# Patient Record
Sex: MALE | Race: WHITE | Employment: OTHER | URBAN - METROPOLITAN AREA
[De-identification: names, ages, dates, MRNs, and addresses within clinical notes are randomized per-mention and may not be internally consistent; named-entity substitution may affect disease eponyms.]

---

## 2018-05-18 ENCOUNTER — HOSPITAL ENCOUNTER (OUTPATIENT)
Dept: PREADMISSION TESTING | Age: 68
Discharge: HOME | End: 2018-05-18
Attending: UROLOGY | Admitting: UROLOGY

## 2018-05-18 VITALS
TEMPERATURE: 97.3 F | RESPIRATION RATE: 16 BRPM | HEIGHT: 70 IN | WEIGHT: 218 LBS | BODY MASS INDEX: 31.21 KG/M2 | OXYGEN SATURATION: 98 % | DIASTOLIC BLOOD PRESSURE: 70 MMHG | SYSTOLIC BLOOD PRESSURE: 143 MMHG | HEART RATE: 64 BPM

## 2018-05-18 LAB
ANION GAP SERPL CALCULATED.3IONS-SCNC: 13 MMOL/L (ref 4–16)
BUN BLDV-MCNC: 20 MG/DL (ref 6–23)
CALCIUM SERPL-MCNC: 9.6 MG/DL (ref 8.3–10.6)
CHLORIDE BLD-SCNC: 98 MMOL/L (ref 99–110)
CO2: 27 MMOL/L (ref 21–32)
CREAT SERPL-MCNC: 0.9 MG/DL (ref 0.9–1.3)
EKG ATRIAL RATE: 53 BPM
EKG DIAGNOSIS: NORMAL
EKG P AXIS: 41 DEGREES
EKG P-R INTERVAL: 152 MS
EKG Q-T INTERVAL: 430 MS
EKG QRS DURATION: 106 MS
EKG QTC CALCULATION (BAZETT): 403 MS
EKG R AXIS: -10 DEGREES
EKG T AXIS: 47 DEGREES
EKG VENTRICULAR RATE: 53 BPM
GFR AFRICAN AMERICAN: >60 ML/MIN/1.73M2
GFR NON-AFRICAN AMERICAN: >60 ML/MIN/1.73M2
GLUCOSE BLD-MCNC: 91 MG/DL (ref 70–99)
HCT VFR BLD CALC: 46.1 % (ref 42–52)
HEMOGLOBIN: 15.3 GM/DL (ref 13.5–18)
MCH RBC QN AUTO: 31.7 PG (ref 27–31)
MCHC RBC AUTO-ENTMCNC: 33.2 % (ref 32–36)
MCV RBC AUTO: 95.4 FL (ref 78–100)
PDW BLD-RTO: 12.2 % (ref 11.7–14.9)
PLATELET # BLD: 355 K/CU MM (ref 140–440)
PMV BLD AUTO: 9 FL (ref 7.5–11.1)
POTASSIUM SERPL-SCNC: 5.1 MMOL/L (ref 3.5–5.1)
RBC # BLD: 4.83 M/CU MM (ref 4.6–6.2)
SODIUM BLD-SCNC: 138 MMOL/L (ref 135–145)
WBC # BLD: 7.3 K/CU MM (ref 4–10.5)

## 2018-05-18 RX ORDER — TAMSULOSIN HYDROCHLORIDE 0.4 MG/1
0.4 CAPSULE ORAL 2 TIMES DAILY
COMMUNITY
End: 2019-06-19 | Stop reason: ALTCHOICE

## 2018-05-18 RX ORDER — OXYCODONE HYDROCHLORIDE AND ACETAMINOPHEN 5; 325 MG/1; MG/1
1 TABLET ORAL PRN
COMMUNITY
End: 2019-07-22

## 2018-05-18 RX ORDER — MAG HYDROX/ALUMINUM HYD/SIMETH 400-400-40
SUSPENSION, ORAL (FINAL DOSE FORM) ORAL EVERY MORNING
COMMUNITY
End: 2019-06-19 | Stop reason: ALTCHOICE

## 2018-05-18 RX ORDER — CETIRIZINE HYDROCHLORIDE, PSEUDOEPHEDRINE HYDROCHLORIDE 5; 120 MG/1; MG/1
1 TABLET, FILM COATED, EXTENDED RELEASE ORAL PRN
COMMUNITY
End: 2019-06-19 | Stop reason: ALTCHOICE

## 2018-05-18 RX ORDER — LISINOPRIL 10 MG/1
10 TABLET ORAL EVERY MORNING
COMMUNITY
End: 2019-06-19 | Stop reason: ALTCHOICE

## 2018-05-18 RX ORDER — TEMAZEPAM 30 MG/1
30 CAPSULE ORAL NIGHTLY
COMMUNITY
End: 2019-07-22

## 2018-05-18 RX ORDER — CIPROFLOXACIN 2 MG/ML
400 INJECTION, SOLUTION INTRAVENOUS ONCE
Status: CANCELLED | OUTPATIENT
Start: 2018-05-21

## 2018-05-18 ASSESSMENT — PAIN SCALES - GENERAL: PAINLEVEL_OUTOF10: 0

## 2018-05-21 PROBLEM — N32.89 BLADDER MASS: Status: ACTIVE | Noted: 2018-05-21

## 2018-06-13 ENCOUNTER — HOSPITAL ENCOUNTER (OUTPATIENT)
Dept: OTHER | Age: 68
Discharge: HOME | End: 2018-06-13
Attending: INTERNAL MEDICINE | Admitting: INTERNAL MEDICINE

## 2018-06-13 LAB
ALBUMIN SERPL-MCNC: 4.4 GM/DL (ref 3.4–5)
ALP BLD-CCNC: 61 IU/L (ref 40–129)
ALT SERPL-CCNC: 31 U/L (ref 10–40)
ANION GAP SERPL CALCULATED.3IONS-SCNC: 13 MMOL/L (ref 4–16)
AST SERPL-CCNC: 23 IU/L (ref 15–37)
BILIRUB SERPL-MCNC: 0.2 MG/DL (ref 0–1)
BUN BLDV-MCNC: 24 MG/DL (ref 6–23)
CALCIUM SERPL-MCNC: 9.8 MG/DL (ref 8.3–10.6)
CHLORIDE BLD-SCNC: 100 MMOL/L (ref 99–110)
CO2: 27 MMOL/L (ref 21–32)
CREAT SERPL-MCNC: 0.9 MG/DL (ref 0.9–1.3)
FERRITIN: 316 NG/ML (ref 30–400)
GFR AFRICAN AMERICAN: >60 ML/MIN/1.73M2
GFR NON-AFRICAN AMERICAN: >60 ML/MIN/1.73M2
GLUCOSE BLD-MCNC: 74 MG/DL (ref 70–99)
IRON: 89 UG/DL (ref 59–158)
MAGNESIUM: 2.3 MG/DL (ref 1.8–2.4)
PCT TRANSFERRIN: 31 % (ref 10–44)
POTASSIUM SERPL-SCNC: 4.8 MMOL/L (ref 3.5–5.1)
SODIUM BLD-SCNC: 140 MMOL/L (ref 135–145)
TOTAL IRON BINDING CAPACITY: 291 UG/DL (ref 250–450)
TOTAL PROTEIN: 7 GM/DL (ref 6.4–8.2)
UNSATURATED IRON BINDING CAPACITY: 202 UG/DL (ref 110–370)

## 2018-06-29 ENCOUNTER — HOSPITAL ENCOUNTER (OUTPATIENT)
Dept: OTHER | Age: 68
Discharge: HOME | End: 2018-06-29
Attending: INTERNAL MEDICINE | Admitting: INTERNAL MEDICINE

## 2018-06-29 LAB
ALBUMIN SERPL-MCNC: 3.7 GM/DL (ref 3.4–5)
ALP BLD-CCNC: 76 IU/L (ref 40–128)
ALT SERPL-CCNC: 34 U/L (ref 10–40)
ANION GAP SERPL CALCULATED.3IONS-SCNC: 14 MMOL/L (ref 4–16)
AST SERPL-CCNC: 25 IU/L (ref 15–37)
BACTERIA: NEGATIVE /HPF
BILIRUB SERPL-MCNC: 0.3 MG/DL (ref 0–1)
BILIRUBIN URINE: NEGATIVE MG/DL
BLOOD, URINE: NEGATIVE
BUN BLDV-MCNC: 16 MG/DL (ref 6–23)
CALCIUM SERPL-MCNC: 8.9 MG/DL (ref 8.3–10.6)
CHLORIDE BLD-SCNC: 97 MMOL/L (ref 99–110)
CLARITY: CLEAR
CO2: 25 MMOL/L (ref 21–32)
COLOR: YELLOW
CREAT SERPL-MCNC: 0.8 MG/DL (ref 0.9–1.3)
GFR AFRICAN AMERICAN: >60 ML/MIN/1.73M2
GFR NON-AFRICAN AMERICAN: >60 ML/MIN/1.73M2
GLUCOSE BLD-MCNC: 104 MG/DL (ref 70–99)
GLUCOSE, URINE: NEGATIVE MG/DL
KETONES, URINE: NEGATIVE MG/DL
LEUKOCYTE ESTERASE, URINE: ABNORMAL
MAGNESIUM: 2.2 MG/DL (ref 1.8–2.4)
MUCUS: ABNORMAL HPF
NITRITE URINE, QUANTITATIVE: NEGATIVE
PH, URINE: 5 (ref 5–8)
POTASSIUM SERPL-SCNC: 4.4 MMOL/L (ref 3.5–5.1)
PROTEIN UA: NEGATIVE MG/DL
RBC URINE: 4 /HPF (ref 0–3)
SODIUM BLD-SCNC: 136 MMOL/L (ref 135–145)
SPECIFIC GRAVITY UA: 1.02 (ref 1–1.03)
TOTAL PROTEIN: 6.2 GM/DL (ref 6.4–8.2)
TRICHOMONAS: ABNORMAL /HPF
UROBILINOGEN, URINE: NORMAL MG/DL (ref 0.2–1)
WBC UA: 28 /HPF (ref 0–2)

## 2018-07-01 LAB
CULTURE: NORMAL
REPORT STATUS: NORMAL
REQUEST PROBLEM: NORMAL
SPECIMEN: NORMAL

## 2018-07-06 ENCOUNTER — HOSPITAL ENCOUNTER (OUTPATIENT)
Dept: OTHER | Age: 68
Discharge: HOME | End: 2018-07-06
Attending: INTERNAL MEDICINE | Admitting: INTERNAL MEDICINE

## 2018-07-06 LAB
ALBUMIN SERPL-MCNC: 4 GM/DL (ref 3.4–5)
ALP BLD-CCNC: 73 IU/L (ref 40–129)
ALT SERPL-CCNC: 27 U/L (ref 10–40)
ANION GAP SERPL CALCULATED.3IONS-SCNC: 12 MMOL/L (ref 4–16)
AST SERPL-CCNC: 24 IU/L (ref 15–37)
BILIRUB SERPL-MCNC: 0.2 MG/DL (ref 0–1)
BUN BLDV-MCNC: 17 MG/DL (ref 6–23)
CALCIUM SERPL-MCNC: 9 MG/DL (ref 8.3–10.6)
CHLORIDE BLD-SCNC: 99 MMOL/L (ref 99–110)
CO2: 26 MMOL/L (ref 21–32)
CREAT SERPL-MCNC: 0.8 MG/DL (ref 0.9–1.3)
GFR AFRICAN AMERICAN: >60 ML/MIN/1.73M2
GFR NON-AFRICAN AMERICAN: >60 ML/MIN/1.73M2
GLUCOSE BLD-MCNC: 117 MG/DL (ref 70–99)
MAGNESIUM: 2.1 MG/DL (ref 1.8–2.4)
POTASSIUM SERPL-SCNC: 4.4 MMOL/L (ref 3.5–5.1)
SODIUM BLD-SCNC: 137 MMOL/L (ref 135–145)
TOTAL PROTEIN: 6.6 GM/DL (ref 6.4–8.2)

## 2018-07-07 LAB
CULTURE: NORMAL
REPORT STATUS: NORMAL
REQUEST PROBLEM: NORMAL
SPECIMEN: NORMAL

## 2018-07-16 ENCOUNTER — HOSPITAL ENCOUNTER (OUTPATIENT)
Dept: OTHER | Age: 68
Discharge: HOME | End: 2018-07-16
Attending: NURSE PRACTITIONER | Admitting: NURSE PRACTITIONER

## 2018-07-16 LAB
ALBUMIN SERPL-MCNC: 4 GM/DL (ref 3.4–5)
ALP BLD-CCNC: 59 IU/L (ref 40–128)
ALT SERPL-CCNC: 19 U/L (ref 10–40)
ANION GAP SERPL CALCULATED.3IONS-SCNC: 18 MMOL/L (ref 4–16)
AST SERPL-CCNC: 17 IU/L (ref 15–37)
BILIRUB SERPL-MCNC: 0.3 MG/DL (ref 0–1)
BUN BLDV-MCNC: 33 MG/DL (ref 6–23)
CALCIUM SERPL-MCNC: 9.2 MG/DL (ref 8.3–10.6)
CHLORIDE BLD-SCNC: 95 MMOL/L (ref 99–110)
CO2: 25 MMOL/L (ref 21–32)
CREAT SERPL-MCNC: 1.2 MG/DL (ref 0.9–1.3)
GFR AFRICAN AMERICAN: >60 ML/MIN/1.73M2
GFR NON-AFRICAN AMERICAN: >60 ML/MIN/1.73M2
GLUCOSE BLD-MCNC: 93 MG/DL (ref 70–99)
POTASSIUM SERPL-SCNC: 4.8 MMOL/L (ref 3.5–5.1)
SODIUM BLD-SCNC: 138 MMOL/L (ref 135–145)
TOTAL PROTEIN: 6.6 GM/DL (ref 6.4–8.2)

## 2018-08-20 ENCOUNTER — HOSPITAL ENCOUNTER (OUTPATIENT)
Dept: OTHER | Age: 68
Discharge: HOME | End: 2018-08-20
Attending: INTERNAL MEDICINE | Admitting: INTERNAL MEDICINE

## 2018-08-20 LAB — MAGNESIUM: 1.8 MG/DL (ref 1.8–2.4)

## 2018-08-27 ENCOUNTER — HOSPITAL ENCOUNTER (OUTPATIENT)
Dept: OTHER | Age: 68
Discharge: HOME | End: 2018-08-27
Attending: INTERNAL MEDICINE | Admitting: INTERNAL MEDICINE

## 2018-08-27 LAB — MAGNESIUM: 2.1 MG/DL (ref 1.8–2.4)

## 2018-09-28 ENCOUNTER — HOSPITAL ENCOUNTER (OUTPATIENT)
Age: 68
Setting detail: SPECIMEN
Discharge: HOME | End: 2018-09-28
Payer: MEDICARE

## 2018-09-28 LAB
ALBUMIN SERPL-MCNC: 4.2 GM/DL (ref 3.4–5)
ALP BLD-CCNC: 53 IU/L (ref 40–128)
ALT SERPL-CCNC: 15 U/L (ref 10–40)
ANION GAP SERPL CALCULATED.3IONS-SCNC: 12 MMOL/L (ref 4–16)
AST SERPL-CCNC: 20 IU/L (ref 15–37)
BILIRUB SERPL-MCNC: 0.2 MG/DL (ref 0–1)
BUN BLDV-MCNC: 17 MG/DL (ref 6–23)
CALCIUM SERPL-MCNC: 9.8 MG/DL (ref 8.3–10.6)
CHLORIDE BLD-SCNC: 103 MMOL/L (ref 99–110)
CO2: 27 MMOL/L (ref 21–32)
CREAT SERPL-MCNC: 1 MG/DL (ref 0.9–1.3)
GFR AFRICAN AMERICAN: >60 ML/MIN/1.73M2
GFR NON-AFRICAN AMERICAN: >60 ML/MIN/1.73M2
GLUCOSE BLD-MCNC: 88 MG/DL (ref 70–99)
POTASSIUM SERPL-SCNC: 5.3 MMOL/L (ref 3.5–5.1)
SODIUM BLD-SCNC: 142 MMOL/L (ref 135–145)
TOTAL PROTEIN: 6.2 GM/DL (ref 6.4–8.2)

## 2018-09-28 PROCEDURE — 80053 COMPREHEN METABOLIC PANEL: CPT

## 2018-10-12 ENCOUNTER — HOSPITAL ENCOUNTER (OUTPATIENT)
Dept: MRI IMAGING | Age: 68
Discharge: HOME | End: 2018-10-12
Payer: MEDICARE

## 2018-10-12 DIAGNOSIS — R11.0 NAUSEOUS: ICD-10-CM

## 2018-10-12 DIAGNOSIS — R42 DIZZINESS AND GIDDINESS: ICD-10-CM

## 2018-10-12 DIAGNOSIS — G44.019 EPISODIC CLUSTER HEADACHE, NOT INTRACTABLE: ICD-10-CM

## 2018-10-12 PROCEDURE — 70553 MRI BRAIN STEM W/O & W/DYE: CPT

## 2018-10-12 PROCEDURE — A9579 GAD-BASE MR CONTRAST NOS,1ML: HCPCS | Performed by: INTERNAL MEDICINE

## 2018-10-12 PROCEDURE — 6360000004 HC RX CONTRAST MEDICATION: Performed by: INTERNAL MEDICINE

## 2018-10-12 RX ADMIN — GADOTERIDOL 20 ML: 279.3 INJECTION, SOLUTION INTRAVENOUS at 09:24

## 2018-11-30 ENCOUNTER — HOSPITAL ENCOUNTER (OUTPATIENT)
Age: 68
Setting detail: SPECIMEN
Discharge: HOME | End: 2018-11-30
Payer: MEDICARE

## 2018-11-30 LAB
ALBUMIN SERPL-MCNC: 4.5 GM/DL (ref 3.4–5)
ALP BLD-CCNC: 50 IU/L (ref 40–128)
ALT SERPL-CCNC: 18 U/L (ref 10–40)
ANION GAP SERPL CALCULATED.3IONS-SCNC: 13 MMOL/L (ref 4–16)
AST SERPL-CCNC: 19 IU/L (ref 15–37)
BILIRUB SERPL-MCNC: 0.2 MG/DL (ref 0–1)
BUN BLDV-MCNC: 18 MG/DL (ref 6–23)
CALCIUM SERPL-MCNC: 9.6 MG/DL (ref 8.3–10.6)
CHLORIDE BLD-SCNC: 99 MMOL/L (ref 99–110)
CO2: 26 MMOL/L (ref 21–32)
CREAT SERPL-MCNC: 1.2 MG/DL (ref 0.9–1.3)
GFR AFRICAN AMERICAN: >60 ML/MIN/1.73M2
GFR NON-AFRICAN AMERICAN: 60 ML/MIN/1.73M2
GLUCOSE BLD-MCNC: 101 MG/DL (ref 70–99)
POTASSIUM SERPL-SCNC: 5.2 MMOL/L (ref 3.5–5.1)
SODIUM BLD-SCNC: 138 MMOL/L (ref 135–145)
TOTAL PROTEIN: 6.6 GM/DL (ref 6.4–8.2)

## 2018-11-30 PROCEDURE — 84154 ASSAY OF PSA FREE: CPT

## 2018-11-30 PROCEDURE — 80053 COMPREHEN METABOLIC PANEL: CPT

## 2018-11-30 PROCEDURE — 84153 ASSAY OF PSA TOTAL: CPT

## 2018-12-04 LAB
PROSTATE SPECIFIC ANTIGEN FREE: <0.1
PROSTATE SPECIFIC ANTIGEN PERCENT FREE: NORMAL
PROSTATE SPECIFIC ANTIGEN: <0.1

## 2019-02-11 ENCOUNTER — HOSPITAL ENCOUNTER (OUTPATIENT)
Age: 69
Setting detail: SPECIMEN
Discharge: HOME | End: 2019-02-11
Payer: MEDICARE

## 2019-02-11 LAB
ALBUMIN SERPL-MCNC: 4.3 GM/DL (ref 3.4–5)
ALP BLD-CCNC: 53 IU/L (ref 40–128)
ALT SERPL-CCNC: 21 U/L (ref 10–40)
ANION GAP SERPL CALCULATED.3IONS-SCNC: 10 MMOL/L (ref 4–16)
AST SERPL-CCNC: 26 IU/L (ref 15–37)
BILIRUB SERPL-MCNC: 0.2 MG/DL (ref 0–1)
BUN BLDV-MCNC: 20 MG/DL (ref 6–23)
CALCIUM SERPL-MCNC: 9.3 MG/DL (ref 8.3–10.6)
CHLORIDE BLD-SCNC: 104 MMOL/L (ref 99–110)
CO2: 26 MMOL/L (ref 21–32)
CREAT SERPL-MCNC: 1.4 MG/DL (ref 0.9–1.3)
GFR AFRICAN AMERICAN: >60 ML/MIN/1.73M2
GFR NON-AFRICAN AMERICAN: 50 ML/MIN/1.73M2
GLUCOSE BLD-MCNC: 93 MG/DL (ref 70–99)
MAGNESIUM: 2 MG/DL (ref 1.8–2.4)
POTASSIUM SERPL-SCNC: 4.2 MMOL/L (ref 3.5–5.1)
SODIUM BLD-SCNC: 140 MMOL/L (ref 135–145)
TOTAL PROTEIN: 6.7 GM/DL (ref 6.4–8.2)

## 2019-02-11 PROCEDURE — 80053 COMPREHEN METABOLIC PANEL: CPT

## 2019-02-11 PROCEDURE — 83735 ASSAY OF MAGNESIUM: CPT

## 2019-03-04 ENCOUNTER — HOSPITAL ENCOUNTER (OUTPATIENT)
Dept: MRI IMAGING | Age: 69
Discharge: HOME | End: 2019-03-04
Payer: MEDICARE

## 2019-03-04 DIAGNOSIS — C80.1: ICD-10-CM

## 2019-03-04 DIAGNOSIS — R42 DIZZINESS AND GIDDINESS: ICD-10-CM

## 2019-03-04 PROCEDURE — A9577 INJ MULTIHANCE: HCPCS | Performed by: NURSE PRACTITIONER

## 2019-03-04 PROCEDURE — 6360000004 HC RX CONTRAST MEDICATION: Performed by: NURSE PRACTITIONER

## 2019-03-04 PROCEDURE — 70553 MRI BRAIN STEM W/O & W/DYE: CPT

## 2019-03-04 RX ADMIN — GADOBENATE DIMEGLUMINE 9 ML: 529 INJECTION, SOLUTION INTRAVENOUS at 12:21

## 2019-03-07 ENCOUNTER — HOSPITAL ENCOUNTER (OUTPATIENT)
Dept: PET IMAGING | Age: 69
Discharge: HOME | End: 2019-03-07
Payer: MEDICARE

## 2019-03-07 DIAGNOSIS — C67.9 MALIGNANT NEOPLASM OF URINARY BLADDER, UNSPECIFIED SITE (HCC): ICD-10-CM

## 2019-03-07 PROCEDURE — A9552 F18 FDG: HCPCS | Performed by: RADIOLOGY

## 2019-03-07 PROCEDURE — 78815 PET IMAGE W/CT SKULL-THIGH: CPT

## 2019-03-07 PROCEDURE — 2580000003 HC RX 258: Performed by: RADIOLOGY

## 2019-03-07 PROCEDURE — 3430000000 HC RX DIAGNOSTIC RADIOPHARMACEUTICAL: Performed by: RADIOLOGY

## 2019-03-07 RX ORDER — SODIUM CHLORIDE 0.9 % (FLUSH) 0.9 %
5 SYRINGE (ML) INJECTION PRN
Status: DISCONTINUED | OUTPATIENT
Start: 2019-03-07 | End: 2019-03-08 | Stop reason: HOSPADM

## 2019-03-07 RX ORDER — FLUDEOXYGLUCOSE F 18 200 MCI/ML
16.72 INJECTION, SOLUTION INTRAVENOUS
Status: COMPLETED | OUTPATIENT
Start: 2019-03-07 | End: 2019-03-07

## 2019-03-07 RX ADMIN — SODIUM CHLORIDE, PRESERVATIVE FREE 5 ML: 5 INJECTION INTRAVENOUS at 15:09

## 2019-03-07 RX ADMIN — FLUDEOXYGLUCOSE F 18 16.72 MILLICURIE: 200 INJECTION, SOLUTION INTRAVENOUS at 15:09

## 2019-04-25 ENCOUNTER — HOSPITAL ENCOUNTER (OUTPATIENT)
Dept: MRI IMAGING | Age: 69
Discharge: HOME | End: 2019-04-25
Payer: MEDICARE

## 2019-04-25 DIAGNOSIS — C79.49 SECONDARY MALIGNANT NEOPLASM OF BRAIN AND SPINAL CORD (HCC): ICD-10-CM

## 2019-04-25 DIAGNOSIS — C79.31 SECONDARY MALIGNANT NEOPLASM OF BRAIN AND SPINAL CORD (HCC): ICD-10-CM

## 2019-04-25 LAB
GFR AFRICAN AMERICAN: >60 ML/MIN/1.73M2
GFR NON-AFRICAN AMERICAN: 56 ML/MIN/1.73M2
POC CREATININE: 1.3 MG/DL (ref 0.9–1.3)

## 2019-04-25 PROCEDURE — 70553 MRI BRAIN STEM W/O & W/DYE: CPT

## 2019-04-25 PROCEDURE — A9577 INJ MULTIHANCE: HCPCS | Performed by: RADIOLOGY

## 2019-04-25 PROCEDURE — 6360000004 HC RX CONTRAST MEDICATION: Performed by: RADIOLOGY

## 2019-04-25 RX ADMIN — GADOBENATE DIMEGLUMINE 10 ML: 529 INJECTION, SOLUTION INTRAVENOUS at 11:38

## 2019-05-03 ENCOUNTER — HOSPITAL ENCOUNTER (OUTPATIENT)
Dept: NUCLEAR MEDICINE | Age: 69
Discharge: HOME | End: 2019-05-03
Payer: MEDICARE

## 2019-05-03 DIAGNOSIS — M54.50 NONSPECIFIC PAIN IN THE LUMBAR REGION: ICD-10-CM

## 2019-05-03 DIAGNOSIS — C79.51 BONE METASTASIS (HCC): ICD-10-CM

## 2019-05-03 PROCEDURE — 78306 BONE IMAGING WHOLE BODY: CPT

## 2019-05-03 PROCEDURE — A9503 TC99M MEDRONATE: HCPCS | Performed by: RADIOLOGY

## 2019-05-03 PROCEDURE — 3430000000 HC RX DIAGNOSTIC RADIOPHARMACEUTICAL: Performed by: RADIOLOGY

## 2019-05-03 RX ORDER — TC 99M MEDRONATE 20 MG/10ML
25.2 INJECTION, POWDER, LYOPHILIZED, FOR SOLUTION INTRAVENOUS
Status: COMPLETED | OUTPATIENT
Start: 2019-05-03 | End: 2019-05-03

## 2019-05-03 RX ADMIN — TC 99M MEDRONATE 25.2 MILLICURIE: 20 INJECTION, POWDER, LYOPHILIZED, FOR SOLUTION INTRAVENOUS at 10:20

## 2019-05-10 ENCOUNTER — HOSPITAL ENCOUNTER (OUTPATIENT)
Age: 69
Setting detail: SPECIMEN
Discharge: HOME | End: 2019-05-10
Payer: MEDICARE

## 2019-05-10 LAB
ALBUMIN SERPL-MCNC: 4.3 GM/DL (ref 3.4–5)
ALP BLD-CCNC: 50 IU/L (ref 40–128)
ALT SERPL-CCNC: 16 U/L (ref 10–40)
ANION GAP SERPL CALCULATED.3IONS-SCNC: 15 MMOL/L (ref 4–16)
AST SERPL-CCNC: 20 IU/L (ref 15–37)
BILIRUB SERPL-MCNC: 0.5 MG/DL (ref 0–1)
BUN BLDV-MCNC: 20 MG/DL (ref 6–23)
CALCIUM SERPL-MCNC: 9.5 MG/DL (ref 8.3–10.6)
CHLORIDE BLD-SCNC: 99 MMOL/L (ref 99–110)
CO2: 25 MMOL/L (ref 21–32)
CREAT SERPL-MCNC: 1.2 MG/DL (ref 0.9–1.3)
GFR AFRICAN AMERICAN: >60 ML/MIN/1.73M2
GFR NON-AFRICAN AMERICAN: 60 ML/MIN/1.73M2
GLUCOSE BLD-MCNC: 107 MG/DL (ref 70–99)
POTASSIUM SERPL-SCNC: 4.8 MMOL/L (ref 3.5–5.1)
PSA ULTRASENSITIVE: 0.01 NG/ML (ref 0–4)
SODIUM BLD-SCNC: 139 MMOL/L (ref 135–145)
T4 FREE: 1.07 NG/DL (ref 0.9–1.8)
TOTAL PROTEIN: 6.4 GM/DL (ref 6.4–8.2)
TSH HIGH SENSITIVITY: 2.37 UIU/ML (ref 0.27–4.2)

## 2019-05-10 PROCEDURE — 84443 ASSAY THYROID STIM HORMONE: CPT

## 2019-05-10 PROCEDURE — 84153 ASSAY OF PSA TOTAL: CPT

## 2019-05-10 PROCEDURE — 84439 ASSAY OF FREE THYROXINE: CPT

## 2019-05-10 PROCEDURE — 80053 COMPREHEN METABOLIC PANEL: CPT

## 2019-05-13 ENCOUNTER — HOSPITAL ENCOUNTER (OUTPATIENT)
Dept: GENERAL RADIOLOGY | Age: 69
Discharge: HOME | End: 2019-05-13
Payer: MEDICARE

## 2019-05-13 ENCOUNTER — HOSPITAL ENCOUNTER (OUTPATIENT)
Age: 69
Discharge: HOME | End: 2019-05-13
Payer: MEDICARE

## 2019-05-13 DIAGNOSIS — Z85.51 HISTORY OF BLADDER CANCER: ICD-10-CM

## 2019-05-13 DIAGNOSIS — R53.1 WEAKNESS: ICD-10-CM

## 2019-05-13 DIAGNOSIS — M89.8X9 BONE PAIN: ICD-10-CM

## 2019-05-13 PROCEDURE — 72100 X-RAY EXAM L-S SPINE 2/3 VWS: CPT

## 2019-05-13 PROCEDURE — 72040 X-RAY EXAM NECK SPINE 2-3 VW: CPT

## 2019-05-28 ENCOUNTER — HOSPITAL ENCOUNTER (OUTPATIENT)
Dept: CT IMAGING | Age: 69
Discharge: HOME | End: 2019-05-28
Payer: MEDICARE

## 2019-05-28 DIAGNOSIS — G93.89 PNEUMOCEPHALUS: ICD-10-CM

## 2019-05-28 PROCEDURE — 6360000004 HC RX CONTRAST MEDICATION: Performed by: INTERNAL MEDICINE

## 2019-05-28 PROCEDURE — 70470 CT HEAD/BRAIN W/O & W/DYE: CPT

## 2019-05-28 RX ADMIN — IOPAMIDOL 75 ML: 755 INJECTION, SOLUTION INTRAVENOUS at 17:05

## 2019-06-06 ENCOUNTER — HOSPITAL ENCOUNTER (OUTPATIENT)
Dept: MRI IMAGING | Age: 69
Discharge: HOME | End: 2019-06-06
Payer: MEDICARE

## 2019-06-06 DIAGNOSIS — C79.49 SECONDARY MALIGNANT NEOPLASM OF BRAIN AND SPINAL CORD (HCC): ICD-10-CM

## 2019-06-06 DIAGNOSIS — C79.31 SECONDARY MALIGNANT NEOPLASM OF BRAIN AND SPINAL CORD (HCC): ICD-10-CM

## 2019-06-06 LAB
GFR AFRICAN AMERICAN: >60 ML/MIN/1.73M2
GFR NON-AFRICAN AMERICAN: >60 ML/MIN/1.73M2
POC CREATININE: 1 MG/DL (ref 0.9–1.3)

## 2019-06-06 PROCEDURE — 6360000004 HC RX CONTRAST MEDICATION: Performed by: RADIOLOGY

## 2019-06-06 PROCEDURE — 70553 MRI BRAIN STEM W/O & W/DYE: CPT

## 2019-06-06 PROCEDURE — A9577 INJ MULTIHANCE: HCPCS | Performed by: RADIOLOGY

## 2019-06-06 RX ADMIN — GADOBENATE DIMEGLUMINE 20 ML: 529 INJECTION, SOLUTION INTRAVENOUS at 10:11

## 2019-06-13 RX ORDER — DEXAMETHASONE 4 MG/1
4 TABLET ORAL 3 TIMES DAILY
Status: ON HOLD | COMMUNITY
End: 2019-07-29 | Stop reason: HOSPADM

## 2019-06-13 RX ORDER — LORAZEPAM 0.5 MG/1
0.5 TABLET ORAL NIGHTLY PRN
Status: ON HOLD | COMMUNITY
End: 2019-07-29 | Stop reason: SDUPTHER

## 2019-06-13 RX ORDER — LOSARTAN POTASSIUM 50 MG/1
50 TABLET ORAL DAILY
COMMUNITY
End: 2020-01-16

## 2019-06-13 RX ORDER — MIRTAZAPINE 45 MG/1
45 TABLET, FILM COATED ORAL NIGHTLY
COMMUNITY

## 2019-06-13 RX ORDER — GABAPENTIN 300 MG/1
300 CAPSULE ORAL 3 TIMES DAILY
COMMUNITY

## 2019-06-13 RX ORDER — AMOXICILLIN 250 MG
1 CAPSULE ORAL PRN
COMMUNITY

## 2019-06-13 RX ORDER — LEVETIRACETAM 500 MG/1
500 TABLET ORAL 2 TIMES DAILY
Status: ON HOLD | COMMUNITY
End: 2019-07-29 | Stop reason: HOSPADM

## 2019-06-13 RX ORDER — DRONABINOL 5 MG/1
5 CAPSULE ORAL
Status: ON HOLD | COMMUNITY
End: 2019-08-28 | Stop reason: SDUPTHER

## 2019-06-13 RX ORDER — VITAMIN B COMPLEX
1 CAPSULE ORAL DAILY
COMMUNITY

## 2019-06-18 ENCOUNTER — TELEPHONE (OUTPATIENT)
Dept: RADIATION ONCOLOGY | Age: 69
End: 2019-06-18

## 2019-06-18 NOTE — TELEPHONE ENCOUNTER
INSTRUCTIONS FOR THE DAY OF GAMMA KNIFE PROCEDURE AT THE [facility]    1. Arrive at 6:00 a.m. to register. 2.  DO NOT eat or drink anything after midnight the night before your procedure. 3.  Take all of your usual morning medications the day of the procedure with just a sip of water. 4.  If you are on any blood thinners (Coumadin, Xarelto, Aspirin, Lovenox), you MAY TAKE those medication. There is no need to stop these medications for your procedure. 5.  If you need pain medication during the night before or the morning of your procedure, you may take them with a sip of water. 6.  Bring a current list of all of your medications with you. 7.  Do not wear any make-up. 8.  Wear comfortable, loose-fitting clothing. 9.  Do not wear jewelry, belts, or any clothing that contains metal.  10.  Visitors will be limited to 2 per patient. YOU MUST HAVE SOMEONE DRIVE YOU HOME AFTER YOUR PROCEDURE. Reviewed all pre-procedure instructions with patient via telephone. He seemed confused about which medications he is taking, but states he is taking \"everything Dr. Griselda Bravo prescribed\" which was Keppra and Omeprazole. Medications will be reviewed with his RN in the morning. Answered all questions.     Khadijah Coronel

## 2019-06-19 ENCOUNTER — CLINICAL DOCUMENTATION (OUTPATIENT)
Dept: RADIATION ONCOLOGY | Age: 69
End: 2019-06-19

## 2019-06-19 ENCOUNTER — ANESTHESIA EVENT (OUTPATIENT)
Dept: RADIATION ONCOLOGY | Age: 69
End: 2019-06-19

## 2019-06-19 ENCOUNTER — ANESTHESIA (OUTPATIENT)
Dept: RADIATION ONCOLOGY | Age: 69
End: 2019-06-19

## 2019-06-19 ENCOUNTER — HOSPITAL ENCOUNTER (OUTPATIENT)
Dept: RADIATION ONCOLOGY | Age: 69
Discharge: HOME | End: 2019-06-19
Payer: MEDICARE

## 2019-06-19 ENCOUNTER — HOSPITAL ENCOUNTER (OUTPATIENT)
Dept: MRI IMAGING | Age: 69
Discharge: HOME | End: 2019-06-19
Payer: MEDICARE

## 2019-06-19 ENCOUNTER — ANCILLARY ORDERS (OUTPATIENT)
Dept: MRI IMAGING | Age: 69
End: 2019-06-19

## 2019-06-19 VITALS
OXYGEN SATURATION: 100 % | DIASTOLIC BLOOD PRESSURE: 62 MMHG | RESPIRATION RATE: 18 BRPM | SYSTOLIC BLOOD PRESSURE: 100 MMHG

## 2019-06-19 VITALS
BODY MASS INDEX: 28.67 KG/M2 | HEART RATE: 48 BPM | TEMPERATURE: 97.4 F | OXYGEN SATURATION: 97 % | WEIGHT: 204.8 LBS | RESPIRATION RATE: 18 BRPM | SYSTOLIC BLOOD PRESSURE: 126 MMHG | DIASTOLIC BLOOD PRESSURE: 78 MMHG | HEIGHT: 71 IN

## 2019-06-19 DIAGNOSIS — C79.31 BRAIN METASTASES (HCC): ICD-10-CM

## 2019-06-19 PROCEDURE — 7100000011 HC PHASE II RECOVERY - ADDTL 15 MIN

## 2019-06-19 PROCEDURE — 6360000004 HC RX CONTRAST MEDICATION: Performed by: NEUROLOGICAL SURGERY

## 2019-06-19 PROCEDURE — 6360000002 HC RX W HCPCS: Performed by: NURSE ANESTHETIST, CERTIFIED REGISTERED

## 2019-06-19 PROCEDURE — 70553 MRI BRAIN STEM W/O & W/DYE: CPT

## 2019-06-19 PROCEDURE — 2500000003 HC RX 250 WO HCPCS: Performed by: NEUROLOGICAL SURGERY

## 2019-06-19 PROCEDURE — 77334 RADIATION TREATMENT AID(S): CPT

## 2019-06-19 PROCEDURE — A9579 GAD-BASE MR CONTRAST NOS,1ML: HCPCS | Performed by: NEUROLOGICAL SURGERY

## 2019-06-19 PROCEDURE — 6370000000 HC RX 637 (ALT 250 FOR IP): Performed by: NEUROLOGICAL SURGERY

## 2019-06-19 PROCEDURE — 77300 RADIATION THERAPY DOSE PLAN: CPT

## 2019-06-19 PROCEDURE — 6360000002 HC RX W HCPCS

## 2019-06-19 PROCEDURE — 7100000010 HC PHASE II RECOVERY - FIRST 15 MIN

## 2019-06-19 PROCEDURE — 6360000002 HC RX W HCPCS: Performed by: NEUROLOGICAL SURGERY

## 2019-06-19 PROCEDURE — 77371 SRS MULTISOURCE: CPT

## 2019-06-19 PROCEDURE — 3700000000 HC ANESTHESIA ATTENDED CARE

## 2019-06-19 PROCEDURE — 77295 3-D RADIOTHERAPY PLAN: CPT

## 2019-06-19 PROCEDURE — 3700000001 HC ADD 15 MINUTES (ANESTHESIA)

## 2019-06-19 PROCEDURE — 2580000003 HC RX 258: Performed by: NURSE ANESTHETIST, CERTIFIED REGISTERED

## 2019-06-19 PROCEDURE — 77336 RADIATION PHYSICS CONSULT: CPT

## 2019-06-19 RX ORDER — DEXAMETHASONE SODIUM PHOSPHATE 4 MG/ML
4 INJECTION, SOLUTION INTRA-ARTICULAR; INTRALESIONAL; INTRAMUSCULAR; INTRAVENOUS; SOFT TISSUE ONCE
Status: COMPLETED | OUTPATIENT
Start: 2019-06-19 | End: 2019-06-19

## 2019-06-19 RX ORDER — LIDOCAINE HYDROCHLORIDE 10 MG/ML
30 INJECTION, SOLUTION EPIDURAL; INFILTRATION; INTRACAUDAL; PERINEURAL ONCE
Status: COMPLETED | OUTPATIENT
Start: 2019-06-19 | End: 2019-06-19

## 2019-06-19 RX ORDER — ONDANSETRON 2 MG/ML
4 INJECTION INTRAMUSCULAR; INTRAVENOUS ONCE
Status: COMPLETED | OUTPATIENT
Start: 2019-06-19 | End: 2019-06-19

## 2019-06-19 RX ORDER — LORAZEPAM 0.5 MG/1
0.5 TABLET ORAL ONCE
Status: COMPLETED | OUTPATIENT
Start: 2019-06-19 | End: 2019-06-19

## 2019-06-19 RX ORDER — BUPIVACAINE HYDROCHLORIDE 5 MG/ML
30 INJECTION, SOLUTION EPIDURAL; INTRACAUDAL ONCE
Status: COMPLETED | OUTPATIENT
Start: 2019-06-19 | End: 2019-06-19

## 2019-06-19 RX ORDER — HEPARIN SODIUM (PORCINE) LOCK FLUSH IV SOLN 100 UNIT/ML 100 UNIT/ML
500 SOLUTION INTRAVENOUS PRN
Status: DISCONTINUED | OUTPATIENT
Start: 2019-06-19 | End: 2019-06-20 | Stop reason: HOSPADM

## 2019-06-19 RX ORDER — PROPOFOL 10 MG/ML
INJECTION, EMULSION INTRAVENOUS PRN
Status: DISCONTINUED | OUTPATIENT
Start: 2019-06-19 | End: 2019-06-19 | Stop reason: SDUPTHER

## 2019-06-19 RX ORDER — SODIUM CHLORIDE 9 MG/ML
INJECTION, SOLUTION INTRAVENOUS CONTINUOUS PRN
Status: DISCONTINUED | OUTPATIENT
Start: 2019-06-19 | End: 2019-06-19 | Stop reason: SDUPTHER

## 2019-06-19 RX ADMIN — SODIUM CHLORIDE: 900 INJECTION, SOLUTION INTRAVENOUS at 07:28

## 2019-06-19 RX ADMIN — GADOTERIDOL 40 ML: 279.3 INJECTION, SOLUTION INTRAVENOUS at 09:05

## 2019-06-19 RX ADMIN — LORAZEPAM 0.5 MG: 0.5 TABLET ORAL at 10:20

## 2019-06-19 RX ADMIN — BUPIVACAINE HYDROCHLORIDE 150 MG: 5 INJECTION, SOLUTION EPIDURAL; INTRACAUDAL at 07:00

## 2019-06-19 RX ADMIN — DEXAMETHASONE SODIUM PHOSPHATE 4 MG: 4 INJECTION, SOLUTION INTRAMUSCULAR; INTRAVENOUS at 10:20

## 2019-06-19 RX ADMIN — ONDANSETRON 4 MG: 2 INJECTION INTRAMUSCULAR; INTRAVENOUS at 06:49

## 2019-06-19 RX ADMIN — SODIUM BICARBONATE 1.44 MEQ: 0.2 INJECTION, SOLUTION INTRAVENOUS at 07:00

## 2019-06-19 RX ADMIN — PROPOFOL 70 MG: 10 INJECTION, EMULSION INTRAVENOUS at 07:35

## 2019-06-19 RX ADMIN — LIDOCAINE HYDROCHLORIDE 30 ML: 10 INJECTION, SOLUTION EPIDURAL; INFILTRATION; INTRACAUDAL; PERINEURAL at 07:00

## 2019-06-19 RX ADMIN — PROPOFOL 20 MG: 10 INJECTION, EMULSION INTRAVENOUS at 07:38

## 2019-06-19 RX ADMIN — Medication 500 UNITS: at 12:34

## 2019-06-19 RX ADMIN — PROPOFOL 90 MG: 10 INJECTION, EMULSION INTRAVENOUS at 07:41

## 2019-06-19 ASSESSMENT — PAIN SCALES - GENERAL: PAINLEVEL_OUTOF10: 0

## 2019-06-19 NOTE — PROGRESS NOTES
After Gamma Knife procedure, the G-frame was removed by Albertina Jett RN/SEAN Villar under the supervision of Dr. Dudley Benson and fixation pin sites were observed for bleeding. Bleeding noted from right posterior pin site; pressure held until hemostasis achieved. Pin sites were cleaned with alcohol and povidone-iodine. Sutures were placed to anterior/posterior pin sites by Dr. Dudley Benson. Patient met Phase II discharge criteria. Baseline neurological status unchanged. See discharge Harpreet score and vitals. Discharge instructions were reviewed with patient and Significant Other Madi Hatfield who verbalized understanding using teach back method. A written copy of the instructions along with a steroid taper calendar was also given. Patient has follow up appointments scheduled. Patient was accompanied to transportation by this RN.     Hannah Ramey RN

## 2019-06-19 NOTE — PROGRESS NOTES
Patient brought to [facility] suite and placed on treatment couch. Sequential Compression Devices placed on BLE's per [address] VTE Protocol. AV monitoring in place and verified verbally with patient from console. Continuous SpO2 and pulse monitor visible and monitored by this RN.     Reynaldo Sullivan RN

## 2019-06-19 NOTE — PROGRESS NOTES
Patient received MAC under the supervision of Dr. Anil Pichardo. This RN was present throughout [place] [id number] and assumed care from anesthesia for Phase II recovery. The patient is sedated and breathing easily. See Flow Sheet for vitals and Harpreet Score.     Kota Alberto RN

## 2019-06-19 NOTE — PROGRESS NOTES
[facility]    PATIENT NAME:   Gerson Payne  MR #:  [id number]  YOB: 1950  ACCOUNT #:    SURGEON:  lilliana  ADMIT DATE:  (Not on file)  SERVICE:  rad onc  DICTATED BY: Amy Van MD  SURGERY DATE:  6/19/2019           OPERATIVE REPORT       PREOPERATIVE DIAGNOSIS:     1. Solitary left parietal brain met     POSTOPERATIVE DIAGNOSIS:     1. Solitary left bparietal brain met    PROCEDURE(S) PERFORMED:    1. Moderate sedation (45 minutes)   2. Placement of stereotactic head frame   3. Gamma Knife radiosurgery for 1 brain metastases    SURGEON:  lilliana   RADIATION ONCOLOGIST:  hugo    ANESTHESIA: Moderate sedation: Fentanyl and Versed were administered IV by the nurse under the direct supervision of the surgeon. Monitored nursing care and medication records are available on the chart. Total duration of moderate sedation was  45  minutes    COMPLICATIONS:  none     SPECIMENS:  Not applicable    INDICATIONS:     We recommended Gamma Knife radiosurgery. The patient was aware of the potential benefits in terms of tumor control and the possible risks including (but not limited to):  pin site bleeding/swelling/infection, brain swelling, seizures, radiation injury to the brain, temporary or permanent neurological symptoms, and/or secondary tumor formation. PERFORMANCE STATUS:  90      stable    DETAILS OF PROCEDURE:  The patient was given IV sedation and monitored continuously during the procedure. Each of four pin sites were cleaned with Chloraprep and injected with a mixture of Lidocaine 1%, Marcaine 0.5%, and sodium bicarbonate. The stereotactic head frame was secured with titanium screws. The patient underwent a stereotactic MRI scan with contrast. These images were sent over the network to the [address] [place] [address]. The targets and critical structures were contoured.   An optimal treatment plan was developed by the neurosurgeon and radiation oncologist.The patient then underwent Gamma Knife radiosurgery using the following parameters:    LOCATION:  Left parietal   DIAMETER:1.6 cm       SHAPE:  irregular  SHOT(S):  15  PRESCRIPTION DOSE:  18gray  PRESCRIPTION ISODOSE:  50 %  V    The patient tolerated the procedure without difficulty and the stereotactic frame was removed uneventfully. The patient was instructed on pin site care and medications. TOTAL TIME SPENT WITH PATIENT:  In conformance with CMS regulations, I affirm that I was present for the entire  radiaition  portion of the procedure including stereotactic frame placement, target definition, development of the treatment plan, and stereotactic frame removal.  I was immediately available during treatment delivery in case of complications.     Birgit Shipman MD

## 2019-06-19 NOTE — ANESTHESIA POSTPROCEDURE EVALUATION
Department of Anesthesiology  Postprocedure Note    Patient: Marylene Clack  MRN: [id number]  YOB: 1950  Date of evaluation: 6/19/2019  Time:  8:48 AM     Procedure Summary     Date:  06/19/19 Room / Location:  [facility]    Anesthesia Start:  0728 Anesthesia Stop:  [place]    Procedure:  GAMMAKNIFE Diagnosis:  Secondary malignant neoplasm of brain    Scheduled Providers:  Emi Craven MD Responsible Provider:  Emi Craven MD    Anesthesia Type:  MAC ASA Status:  3          Anesthesia Type: MAC    Harpreet Phase I:      Harpreet Phase II:      Last vitals: Reviewed and per EMR flowsheets.        Anesthesia Post Evaluation    Patient location during evaluation: bedside  Patient participation: complete - patient participated  Level of consciousness: awake and alert  Airway patency: patent  Nausea & Vomiting: no nausea and no vomiting  Complications: no  Cardiovascular status: blood pressure returned to baseline  Respiratory status: acceptable  Hydration status: stable

## 2019-06-19 NOTE — OP NOTE
1 [facility]  PATIENT NAME:   Johnathan Cornell   MR #:  [id number]  YOB: 1950   ACCOUNT #:  [de-identified]  SURGEON:  Wisam Crain M.D. ADMIT DATE:  6/19/2019  SERVICE:  Neurosurgery  DICTATED BY: Wisam Crain M.D. SURGERY DATE:  6/19/2019           OPERATIVE REPORT       PREOPERATIVE DIAGNOSIS:     1. 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 Same    PROCEDURE(S) PERFORMED:    1. Placement of stereotactic head frame   2. Gamma Knife radiosurgery for left fronto-parietal metastasis    SURGEON:  Wisam Crain MD   RADIATION ONCOLOGIST: Tor Pereyra MD    ANESTHESIA:  Moderate sedation    COMPLICATIONS:  None    INDICATIONS:   This is a 77year-old man with neuroendocrine bladder cancer who underwent whole brain radiation for a left parietal metastasis. A follow-up MRI scan showed enlargement of the left parietal metastasis with significant edema. We discussed various treatment options but ultimately recommended Gamma Knife radiosurgery. The patient was aware of the potential benefits in terms of tumor control and the possible risks including (but not limited to):  pin site bleeding/swelling/infection, brain swelling, seizures, new neurological symptoms, radiation injury (necrosis) of the brain, and/or secondary tumor formation. PERFORMANCE STATUS:  80%    SYSTEMIC DISEASE:  Stable    DETAILS OF PROCEDURE:  The four pin sites were cleaned with Chloraprep and injected with a mixture of Lidocaine 1%, Marcaine 0.5%, and sodium bicarbonate. The stereotactic head frame was secured with titanium screws. The patient underwent a stereotactic MRI scan with contrast.  These images were sent over the network to the [address] [place] [address]. The targets and critical structures were contoured.   An optimal treatment plan was developed by the neurosurgeon, radiation oncologist, and medical physicist.      The patient then underwent Gamma Knife radiosurgery using the following parameters:    Target Size (cm) Shape Shots Dose (Gy) Isodose (%)   Lt motor 1.90 Oval 15 18 50     The patient tolerated the procedure without difficulty and the stereotactic frame was removed uneventfully. The patient was instructed on pin site care and medications.     TOTAL TIME SPENT WITH PATIENT:  In conformance with CMS regulations, I affirm that I was present for the entire neurosurgical portion of the procedure including stereotactic frame placement, target definition, development of the treatment plan, treatment delivery, and stereotactic frame removal.     Sejal Rosales MD

## 2019-06-19 NOTE — ANESTHESIA PRE PROCEDURE
Department of Anesthesiology  Preprocedure Note       Name:  Eris Dey   Age:  77 y.o.  :  1950                                          MRN:  [id number]         Date:  2019      Surgeon: * Surgery not found *    Procedure: GAMMAKNIFE    Medications prior to admission:   Prior to Admission medications    Medication Sig Start Date End Date Taking? Authorizing Provider   dexamethasone (DECADRON) 4 MG tablet Take 4 mg by mouth 3 times daily   Yes Historical Provider, MD   LORazepam (ATIVAN) 0.5 MG tablet Take 0.5 mg by mouth nightly as needed for Anxiety. Yes Historical Provider, MD   losartan (COZAAR) 50 MG tablet Take 50 mg by mouth daily   Yes Historical Provider, MD   gabapentin (NEURONTIN) 300 MG capsule Take 300 mg by mouth 3 times daily. Yes Historical Provider, MD   levETIRAcetam (KEPPRA) 500 MG tablet Take 500 mg by mouth 2 times daily   Yes Historical Provider, MD   dronabinol (MARINOL) 5 MG capsule Take 5 mg by mouth 2 times daily (before meals). Yes Historical Provider, MD   mirtazapine (REMERON SOL-TAB) 15 MG disintegrating tablet Take 15 mg by mouth nightly   Yes Historical Provider, MD   metoprolol tartrate (LOPRESSOR) 25 MG tablet Take 25 mg by mouth once    Yes Historical Provider, MD   b complex vitamins capsule Take 1 capsule by mouth daily   Yes Historical Provider, MD   senna-docusate (PERICOLACE) 8.6-50 MG per tablet Take 1 tablet by mouth as needed    Yes Historical Provider, MD   temazepam (RESTORIL) 30 MG capsule Take 30 mg by mouth nightly. .   Yes Historical Provider, MD   UNABLE TO FIND Take by mouth every morning Over The Counter One Daily Men's  Multivitamin   Yes Historical Provider, MD   Lactobacillus (PROBIOTIC ACIDOPHILUS PO) Take by mouth every morning Over The Counter   Yes Historical Provider, MD   oxyCODONE-acetaminophen (PERCOCET) 5-325 MG per tablet Take 1 tablet by mouth as needed for Pain. Amber Carty     Historical Provider, MD       Current medications:    Current Allergies   Allergen Reactions    Niacin And Related Hives    Furosemide        Problem List:    Patient Active Problem List   Diagnosis Code    Bladder mass N32.89       Past Medical History:        Diagnosis Date    Arthritis     \"Neck\"    Cancer ([address]     \"found I have rare form of bladder cancer and seeing Dr Stevenson Gray for chemo    Diverticulitis Dx In     \"One Episode\"    Hypertension     Nocturia     Shortness of breath on exertion     Sleep apnea     No CPAP \"I Use A Mouth Piece\"    Teeth missing     Upper And Lower    Urinary frequency     Wears glasses     [place] teeth extracted     4 [place] Teeth Extracted In Past       Past Surgical History:        Procedure Laterality Date    COLONOSCOPY  Last Done In     Polyps Removed In Past    CYSTOSCOPY  05/15/2018    Done At Dr. Amarjit Bland Right Early 0504'L    ENDOSCOPY, COLON, DIAGNOSTIC      EYE SURGERY Bilateral     \"Radial Kerotomy\"    OTHER SURGICAL HISTORY  2018    turbt    TUNNELED VENOUS PORT PLACEMENT Right 2018    DR Denise Poole, [facility]#[id number]    VASECTOMY  1982    WISDOM TOOTH EXTRACTION      4 [place] Teeth Extracted In Past       Social History:    Social History     Tobacco Use    Smoking status: Former Smoker     Packs/day: 2.00     Years: 11.00     Pack years: 22.00     Types: Cigarettes     Start date:      Last attempt to quit:      Years since quittin.4    Smokeless tobacco: Never Used   Substance Use Topics    Alcohol use: Yes     Comment: \"A Couple Times A Year\"                                Counseling given: Not Answered      Vital Signs (Current):   Vitals:    19 0630   BP: 132/88   Pulse: 56   Resp: 18   Temp: 97.5 °F (36.4 °C)   TempSrc: Oral                                              BP Readings from Last 3 Encounters:   19 132/88   18 123/66   18 (!) 169/78       NPO Status: BMI:   Wt Readings from Last 3 Encounters:   06/25/18 209 lb (94.8 kg)   05/21/18 218 lb (98.9 kg)   05/18/18 218 lb (98.9 kg)     There is no height or weight on file to calculate BMI.    CBC:   Lab Results   Component Value Date    WBC 4.3 06/26/2018    RBC 4.14 06/26/2018    HGB 13.1 06/26/2018    HCT 38.6 06/26/2018    MCV 93.2 06/26/2018    RDW 11.9 06/26/2018     06/26/2018       CMP:   Lab Results   Component Value Date     05/10/2019    K 4.8 05/10/2019    CL 99 05/10/2019    CO2 25 05/10/2019    BUN 20 05/10/2019    CREATININE 1.0 06/06/2019    CREATININE 1.2 05/10/2019    GFRAA >60 06/06/2019    LABGLOM >60 06/06/2019    GLUCOSE 107 05/10/2019    PROT 6.4 05/10/2019    CALCIUM 9.5 05/10/2019    BILITOT 0.5 05/10/2019    ALKPHOS 50 05/10/2019    AST 20 05/10/2019    ALT 16 05/10/2019       POC Tests: No results for input(s): POCGLU, POCNA, POCK, POCCL, POCBUN, POCHEMO, POCHCT in the last 72 hours. Coags:   Lab Results   Component Value Date    PROTIME 11.4 06/26/2018    INR 0.98 06/26/2018    APTT 27.7 06/26/2018       HCG (If Applicable): No results found for: PREGTESTUR, PREGSERUM, HCG, HCGQUANT     ABGs: No results found for: PHART, PO2ART, MKC0XVO, TCX9ANJ, BEART, X9VFEUQM     Type & Screen (If Applicable):  No results found for: [facility]    Anesthesia Evaluation  Patient summary reviewed and Nursing notes reviewed  Airway: Mallampati: II  TM distance: >3 FB   Neck ROM: limited  Mouth opening: > = 3 FB Dental: normal exam         Pulmonary:Negative Pulmonary ROS and normal exam  breath sounds clear to auscultation                             Cardiovascular:Negative CV ROS          ECG reviewed  Rhythm: regular  Rate: normal                    Neuro/Psych:   Negative Neuro/Psych ROS              GI/Hepatic/Renal: Neg GI/Hepatic/Renal ROS            Endo/Other: Negative Endo/Other ROS                    Abdominal:           Vascular: negative vascular ROS. Anesthesia Plan      MAC     ASA 3       Induction: intravenous. MIPS: Postoperative opioids intended and Prophylactic antiemetics administered. Anesthetic plan and risks discussed with patient.         Attending anesthesiologist reviewed and agrees with Elisabeth Gerber MD   6/19/2019

## 2019-06-19 NOTE — PROGRESS NOTES
Gamma Knife Radiation Delivery Time Out    1. Patient states name and birthdate correctly? Yes    2. Is this the correct patient? Yes    3. Procedure listed on consent? Stereotactic Radiosurgery, Gamma Knife for LEFT Parietal Metastasis    4. Is this the correct procedure? Yes    5. Does the patient have only one benign target or lesion? No    6. If yes, is the side marked for laterality? N/A    7. If yes, is this the correct side? N/A    8. Has the final radiologist report been reviewed? yes    9. Has patient received IV Dexamethasone prior to radiation delivery? yes    10. Does the patient require Keppra or Ativan prior to treatment delivery? yes    11. Have the pin torques been rechecked? Yes    12. Is a CBCT required prior to treatment delivery? No    13. Are all present in agreement?   Yes    Dr. Karyle Brandt, Dr. Yasemin Hi, [place] Manzano, MS, Maynor Slaughter, SEAN Benton, RN

## 2019-06-19 NOTE — OP NOTE
There have been no changes in the patient's condition since the history and physical.    Jacek Elder.  Himanshu Tenorio MD

## 2019-06-19 NOTE — PROGRESS NOTES
Gamma Knife Frame Placement Time Out    1. Patient states name and birthdate correctly? Yes    2. Is this the correct patient? Yes    3. Procedure listed on consent:  G-Frame placement and Gamma Knife radiosurgery for LEFT Parietal Metastasis    4. Is this the correct procedure? Yes    5. Are the consents signed? Yes    6. Does the patient have only one benign target or lesion? No     -If yes, what side are we treating:  na     -Is this the correct side? N/A     -Is the side marked for laterality? N/A    7. Have films been reviewed today? Yes    8. Has the interim History and Physical form been completed? yes    9. Does the patient require a pregnancy test per [address]? no     -If yes, the result was:  na    10. Has the neurosurgeon reviewed the [facility] RN Pre-Procedure Checklist?  Yes    11. .  Are all present in agreement?   Yes    Those present for time out:  Dr. James Zarco, RN, [place]. Luis E Collazo RN    Parveen Ford RN

## 2019-07-01 ENCOUNTER — POST-OP TELEPHONE (OUTPATIENT)
Dept: RADIATION ONCOLOGY | Age: 69
End: 2019-07-01
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
## 2019-07-02 ENCOUNTER — HOSPITAL ENCOUNTER (OUTPATIENT)
Dept: VASCULAR LAB | Age: 69
Discharge: HOME | End: 2019-07-02
Payer: MEDICARE

## 2019-07-02 ENCOUNTER — HOSPITAL ENCOUNTER (OUTPATIENT)
Dept: CT IMAGING | Age: 69
Discharge: HOME | End: 2019-07-02
Payer: MEDICARE

## 2019-07-02 ENCOUNTER — TELEPHONE (OUTPATIENT)
Dept: RADIATION ONCOLOGY | Age: 69
End: 2019-07-02
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
## 2019-07-02 PROCEDURE — 93971 EXTREMITY STUDY: CPT

## 2019-07-02 PROCEDURE — 70450 CT HEAD/BRAIN W/O DYE: CPT

## 2019-07-15 ENCOUNTER — HOSPITAL ENCOUNTER (OUTPATIENT)
Dept: CT IMAGING | Age: 69
Discharge: HOME | End: 2019-07-15
Payer: MEDICARE

## 2019-07-15 DIAGNOSIS — C67.2 CANCER OF LATERAL WALL OF URINARY BLADDER (HCC): ICD-10-CM

## 2019-07-15 PROCEDURE — 74176 CT ABD & PELVIS W/O CONTRAST: CPT

## 2019-07-15 PROCEDURE — 71250 CT THORAX DX C-: CPT

## 2019-07-15 PROCEDURE — 6360000004 HC RX CONTRAST MEDICATION: Performed by: INTERNAL MEDICINE

## 2019-07-15 RX ADMIN — IOHEXOL 50 ML: 240 INJECTION, SOLUTION INTRATHECAL; INTRAVASCULAR; INTRAVENOUS; ORAL at 10:46

## 2019-07-21 ENCOUNTER — APPOINTMENT (OUTPATIENT)
Dept: GENERAL RADIOLOGY | Age: 69
End: 2019-07-21
Payer: MEDICARE

## 2019-07-21 ENCOUNTER — APPOINTMENT (OUTPATIENT)
Dept: ULTRASOUND IMAGING | Age: 69
End: 2019-07-21
Payer: MEDICARE

## 2019-07-21 ENCOUNTER — HOSPITAL ENCOUNTER (OUTPATIENT)
Age: 69
Setting detail: OBSERVATION
Discharge: NURSING, REHAB OR LONG-TERM CARE | End: 2019-07-29
Attending: EMERGENCY MEDICINE | Admitting: INTERNAL MEDICINE
Payer: MEDICARE

## 2019-07-21 ENCOUNTER — APPOINTMENT (OUTPATIENT)
Dept: CT IMAGING | Age: 69
End: 2019-07-21
Payer: MEDICARE

## 2019-07-21 DIAGNOSIS — I82.401 DEEP VEIN THROMBOSIS (DVT) OF RIGHT LOWER EXTREMITY, UNSPECIFIED CHRONICITY, UNSPECIFIED VEIN (HCC): ICD-10-CM

## 2019-07-21 DIAGNOSIS — G93.41 ACUTE METABOLIC ENCEPHALOPATHY: ICD-10-CM

## 2019-07-21 DIAGNOSIS — R41.82 ALTERED MENTAL STATUS, UNSPECIFIED ALTERED MENTAL STATUS TYPE: Primary | ICD-10-CM

## 2019-07-21 DIAGNOSIS — R53.1 GENERAL WEAKNESS: ICD-10-CM

## 2019-07-21 LAB
ALBUMIN SERPL-MCNC: 3.4 GM/DL (ref 3.4–5)
ALP BLD-CCNC: 60 IU/L (ref 40–128)
ALT SERPL-CCNC: 31 U/L (ref 10–40)
ANION GAP SERPL CALCULATED.3IONS-SCNC: 16 MMOL/L (ref 4–16)
AST SERPL-CCNC: 26 IU/L (ref 15–37)
BACTERIA: ABNORMAL /HPF
BASOPHILS ABSOLUTE: 0 K/CU MM
BASOPHILS RELATIVE PERCENT: 0.1 % (ref 0–1)
BILIRUB SERPL-MCNC: 0.5 MG/DL (ref 0–1)
BILIRUBIN URINE: NEGATIVE MG/DL
BLOOD, URINE: NEGATIVE
BUN BLDV-MCNC: 21 MG/DL (ref 6–23)
CALCIUM OXALATE CRYSTALS: ABNORMAL /HPF
CALCIUM SERPL-MCNC: 8.7 MG/DL (ref 8.3–10.6)
CHLORIDE BLD-SCNC: 99 MMOL/L (ref 99–110)
CLARITY: ABNORMAL
CO2: 22 MMOL/L (ref 21–32)
COLOR: ABNORMAL
CREAT SERPL-MCNC: 1.2 MG/DL (ref 0.9–1.3)
DIFFERENTIAL TYPE: ABNORMAL
EOSINOPHILS ABSOLUTE: 0 K/CU MM
EOSINOPHILS RELATIVE PERCENT: 0.3 % (ref 0–3)
GFR AFRICAN AMERICAN: >60 ML/MIN/1.73M2
GFR NON-AFRICAN AMERICAN: 60 ML/MIN/1.73M2
GLUCOSE BLD-MCNC: 166 MG/DL (ref 70–99)
GLUCOSE, URINE: NEGATIVE MG/DL
HCT VFR BLD CALC: 34.4 % (ref 42–52)
HEMOGLOBIN: 11.7 GM/DL (ref 13.5–18)
HYALINE CASTS: 5 /LPF
IMMATURE NEUTROPHIL %: 1 % (ref 0–0.43)
KETONES, URINE: NEGATIVE MG/DL
LEUKOCYTE ESTERASE, URINE: ABNORMAL
LYMPHOCYTES ABSOLUTE: 0.6 K/CU MM
LYMPHOCYTES RELATIVE PERCENT: 8.4 % (ref 24–44)
MCH RBC QN AUTO: 32.9 PG (ref 27–31)
MCHC RBC AUTO-ENTMCNC: 34 % (ref 32–36)
MCV RBC AUTO: 96.6 FL (ref 78–100)
MONOCYTES ABSOLUTE: 0.5 K/CU MM
MONOCYTES RELATIVE PERCENT: 7.7 % (ref 0–4)
MUCUS: ABNORMAL HPF
NITRITE URINE, QUANTITATIVE: NEGATIVE
NON SQUAM EPI CELLS: <1 /HPF
NUCLEATED RBC %: 0.9 %
PDW BLD-RTO: 14.6 % (ref 11.7–14.9)
PH, URINE: 5 (ref 5–8)
PLATELET # BLD: 220 K/CU MM (ref 140–440)
PMV BLD AUTO: 9.2 FL (ref 7.5–11.1)
POTASSIUM SERPL-SCNC: 3.7 MMOL/L (ref 3.5–5.1)
PROTEIN UA: 30 MG/DL
RBC # BLD: 3.56 M/CU MM (ref 4.6–6.2)
RBC URINE: 2 /HPF (ref 0–3)
SEGMENTED NEUTROPHILS ABSOLUTE COUNT: 5.8 K/CU MM
SEGMENTED NEUTROPHILS RELATIVE PERCENT: 82.5 % (ref 36–66)
SODIUM BLD-SCNC: 137 MMOL/L (ref 135–145)
SPECIFIC GRAVITY UA: 1.01 (ref 1–1.03)
TOTAL IMMATURE NEUTOROPHIL: 0.07 K/CU MM
TOTAL NUCLEATED RBC: 0.1 K/CU MM
TOTAL PROTEIN: 6 GM/DL (ref 6.4–8.2)
TRICHOMONAS: ABNORMAL /HPF
TROPONIN T: <0.01 NG/ML
UROBILINOGEN, URINE: 1 MG/DL (ref 0.2–1)
WBC # BLD: 7 K/CU MM (ref 4–10.5)
WBC UA: 16 /HPF (ref 0–2)

## 2019-07-21 PROCEDURE — 80053 COMPREHEN METABOLIC PANEL: CPT

## 2019-07-21 PROCEDURE — 93005 ELECTROCARDIOGRAM TRACING: CPT | Performed by: EMERGENCY MEDICINE

## 2019-07-21 PROCEDURE — 71045 X-RAY EXAM CHEST 1 VIEW: CPT

## 2019-07-21 PROCEDURE — 81001 URINALYSIS AUTO W/SCOPE: CPT

## 2019-07-21 PROCEDURE — 82140 ASSAY OF AMMONIA: CPT

## 2019-07-21 PROCEDURE — 93971 EXTREMITY STUDY: CPT

## 2019-07-21 PROCEDURE — 85025 COMPLETE CBC W/AUTO DIFF WBC: CPT

## 2019-07-21 PROCEDURE — 99285 EMERGENCY DEPT VISIT HI MDM: CPT

## 2019-07-21 PROCEDURE — 84484 ASSAY OF TROPONIN QUANT: CPT

## 2019-07-21 PROCEDURE — 70450 CT HEAD/BRAIN W/O DYE: CPT

## 2019-07-21 RX ORDER — 0.9 % SODIUM CHLORIDE 0.9 %
1000 INTRAVENOUS SOLUTION INTRAVENOUS ONCE
Status: COMPLETED | OUTPATIENT
Start: 2019-07-21 | End: 2019-07-22

## 2019-07-22 ENCOUNTER — APPOINTMENT (OUTPATIENT)
Dept: ULTRASOUND IMAGING | Age: 69
End: 2019-07-22
Payer: MEDICARE

## 2019-07-22 ENCOUNTER — APPOINTMENT (OUTPATIENT)
Dept: MRI IMAGING | Age: 69
End: 2019-07-22
Payer: MEDICARE

## 2019-07-22 PROBLEM — G93.41 ACUTE METABOLIC ENCEPHALOPATHY: Status: ACTIVE | Noted: 2019-07-22

## 2019-07-22 LAB — AMMONIA: 28 UMOL/L (ref 16–60)

## 2019-07-22 PROCEDURE — 6370000000 HC RX 637 (ALT 250 FOR IP): Performed by: INTERNAL MEDICINE

## 2019-07-22 PROCEDURE — 93010 ELECTROCARDIOGRAM REPORT: CPT | Performed by: INTERNAL MEDICINE

## 2019-07-22 PROCEDURE — 87040 BLOOD CULTURE FOR BACTERIA: CPT

## 2019-07-22 PROCEDURE — A9579 GAD-BASE MR CONTRAST NOS,1ML: HCPCS | Performed by: PSYCHIATRY & NEUROLOGY

## 2019-07-22 PROCEDURE — 96361 HYDRATE IV INFUSION ADD-ON: CPT

## 2019-07-22 PROCEDURE — 6360000004 HC RX CONTRAST MEDICATION: Performed by: PSYCHIATRY & NEUROLOGY

## 2019-07-22 PROCEDURE — 6360000002 HC RX W HCPCS: Performed by: INTERNAL MEDICINE

## 2019-07-22 PROCEDURE — 6360000002 HC RX W HCPCS: Performed by: EMERGENCY MEDICINE

## 2019-07-22 PROCEDURE — G0378 HOSPITAL OBSERVATION PER HR: HCPCS

## 2019-07-22 PROCEDURE — 93880 EXTRACRANIAL BILAT STUDY: CPT

## 2019-07-22 PROCEDURE — 70553 MRI BRAIN STEM W/O & W/DYE: CPT

## 2019-07-22 PROCEDURE — 96372 THER/PROPH/DIAG INJ SC/IM: CPT

## 2019-07-22 PROCEDURE — 96365 THER/PROPH/DIAG IV INF INIT: CPT

## 2019-07-22 PROCEDURE — 82140 ASSAY OF AMMONIA: CPT

## 2019-07-22 PROCEDURE — 94761 N-INVAS EAR/PLS OXIMETRY MLT: CPT

## 2019-07-22 PROCEDURE — 2580000003 HC RX 258: Performed by: EMERGENCY MEDICINE

## 2019-07-22 RX ORDER — MIRTAZAPINE 15 MG/1
15 TABLET, ORALLY DISINTEGRATING ORAL NIGHTLY
Status: DISCONTINUED | OUTPATIENT
Start: 2019-07-22 | End: 2019-07-29 | Stop reason: HOSPADM

## 2019-07-22 RX ORDER — LACTOBACILLUS RHAMNOSUS GG 10B CELL
1 CAPSULE ORAL EVERY MORNING
Status: DISCONTINUED | OUTPATIENT
Start: 2019-07-22 | End: 2019-07-29 | Stop reason: HOSPADM

## 2019-07-22 RX ORDER — LOSARTAN POTASSIUM 50 MG/1
50 TABLET ORAL NIGHTLY
Status: DISCONTINUED | OUTPATIENT
Start: 2019-07-22 | End: 2019-07-29 | Stop reason: HOSPADM

## 2019-07-22 RX ORDER — DEXAMETHASONE 4 MG/1
4 TABLET ORAL EVERY 6 HOURS SCHEDULED
Status: DISCONTINUED | OUTPATIENT
Start: 2019-07-22 | End: 2019-07-23

## 2019-07-22 RX ORDER — ACETAMINOPHEN 325 MG/1
650 TABLET ORAL EVERY 4 HOURS PRN
Status: DISCONTINUED | OUTPATIENT
Start: 2019-07-22 | End: 2019-07-29 | Stop reason: HOSPADM

## 2019-07-22 RX ORDER — DEXAMETHASONE 2 MG/1
2 TABLET ORAL DAILY
Status: DISCONTINUED | OUTPATIENT
Start: 2019-07-23 | End: 2019-07-22

## 2019-07-22 RX ORDER — METOPROLOL SUCCINATE 25 MG/1
25 TABLET, EXTENDED RELEASE ORAL DAILY
Status: DISCONTINUED | OUTPATIENT
Start: 2019-07-22 | End: 2019-07-29 | Stop reason: HOSPADM

## 2019-07-22 RX ORDER — LEVETIRACETAM 500 MG/1
500 TABLET ORAL 2 TIMES DAILY
Status: DISCONTINUED | OUTPATIENT
Start: 2019-07-23 | End: 2019-07-22

## 2019-07-22 RX ORDER — PANTOPRAZOLE SODIUM 40 MG/1
40 TABLET, DELAYED RELEASE ORAL
Status: DISCONTINUED | OUTPATIENT
Start: 2019-07-22 | End: 2019-07-23

## 2019-07-22 RX ORDER — GABAPENTIN 300 MG/1
300 CAPSULE ORAL 3 TIMES DAILY
Status: DISCONTINUED | OUTPATIENT
Start: 2019-07-23 | End: 2019-07-29 | Stop reason: HOSPADM

## 2019-07-22 RX ORDER — DRONABINOL 2.5 MG/1
5 CAPSULE ORAL 2 TIMES DAILY WITH MEALS
Status: DISCONTINUED | OUTPATIENT
Start: 2019-07-22 | End: 2019-07-29 | Stop reason: HOSPADM

## 2019-07-22 RX ADMIN — DRONABINOL 5 MG: 2.5 CAPSULE ORAL at 17:51

## 2019-07-22 RX ADMIN — METOPROLOL SUCCINATE 25 MG: 25 TABLET, EXTENDED RELEASE ORAL at 10:02

## 2019-07-22 RX ADMIN — ENOXAPARIN SODIUM 90 MG: 100 INJECTION SUBCUTANEOUS at 21:09

## 2019-07-22 RX ADMIN — DRONABINOL 5 MG: 2.5 CAPSULE ORAL at 10:02

## 2019-07-22 RX ADMIN — ENOXAPARIN SODIUM 90 MG: 100 INJECTION SUBCUTANEOUS at 10:01

## 2019-07-22 RX ADMIN — ENOXAPARIN SODIUM 90 MG: 100 INJECTION SUBCUTANEOUS at 01:19

## 2019-07-22 RX ADMIN — LOSARTAN POTASSIUM 50 MG: 50 TABLET, FILM COATED ORAL at 21:09

## 2019-07-22 RX ADMIN — GADOTERIDOL 19 ML: 279.3 INJECTION, SOLUTION INTRAVENOUS at 18:48

## 2019-07-22 RX ADMIN — SODIUM CHLORIDE 1000 ML: 9 INJECTION, SOLUTION INTRAVENOUS at 00:00

## 2019-07-22 RX ADMIN — CEFTRIAXONE SODIUM 1 G: 1 INJECTION, POWDER, FOR SOLUTION INTRAMUSCULAR; INTRAVENOUS at 01:18

## 2019-07-22 RX ADMIN — MIRTAZAPINE 15 MG: 15 TABLET, ORALLY DISINTEGRATING ORAL at 21:09

## 2019-07-22 RX ADMIN — Medication 1 CAPSULE: at 10:02

## 2019-07-22 NOTE — ED TRIAGE NOTES
Pt arrive via ems from home. Pt's significant other reports she was preparing to change pt's ostomy. Reports she left the room for a short period, and when she came back, pt was resting in his chair with his eyes closed. When she called his name, he opened his eyes and his eyes were rolling back in his head. Wife made several attempts to arouse pt without success. Ems reports once pt was in the squad, he became more and more alert. Pt alert and oriented on arrival to ed. Answers all questions appropriately. Pt dx with brain cancer in January.  Recently had gamma knife surgery on parietal tumor at the [facility].

## 2019-07-22 NOTE — ED PROVIDER NOTES
Physically abused: Not on file     Forced sexual activity: Not on file   Other Topics Concern    Not on file   Social History Narrative    Not on file     Current Facility-Administered Medications   Medication Dose Route Frequency Provider Last Rate Last Dose    [START ON 7/23/2019] dexamethasone (DECADRON) tablet 2 mg  2 mg Oral Daily Yosvany Van MD        dronabinol (MARINOL) capsule 5 mg  5 mg Oral BID WC Yosvany Van MD        [START ON 7/23/2019] gabapentin (NEURONTIN) capsule 300 mg  300 mg Oral TID Yosvany Van MD        lactobacillus (CULTURELLE) capsule 1 capsule  1 capsule Oral QAM Yosvany Van MD       [facility] [START ON 7/23/2019] levETIRAcetam (KEPPRA) tablet 500 mg  500 mg Oral BID Yosvany Van MD        losartan (COZAAR) tablet 50 mg  50 mg Oral Nightly Yosvany Van MD        mirtazapine (REMERON SOL-TAB) disintegrating tablet 15 mg  15 mg Oral Nightly Yosvany Van MD        enoxaparin (LOVENOX) injection 90 mg  1 mg/kg Subcutaneous BID Yosvany Van MD        metoprolol succinate (TOPROL XL) extended release tablet 25 mg  25 mg Oral Daily Yosvany Van MD        acetaminophen (TYLENOL) tablet 650 mg  650 mg Oral Q4H PRN Nico Lagunas MD         Allergies   Allergen Reactions    Niacin And Related Hives    Furosemide        Nursing Notes Reviewed    Physical Exam:  ED Triage Vitals [07/21/19 2139]   Enc Vitals Group      BP 99/62      Pulse 88      Resp 24      Temp 98.4 °F (36.9 °C)      Temp Source Oral      SpO2 97 %      Weight 204 lb 12.8 oz (92.9 kg)      Height 5' 11\" (1.803 m)      Head Circumference       Peak Flow       Pain Score       Pain Loc       Pain Edu? Excl. in [address]? My pulse ox interpretation is - normal    General appearance:  No acute distress. Skin:  Warm. Dry. Eye:  Extraocular movements intact. Neck:  Trachea midline.    Extremity: Right lower extremity tender to right calf, pulses Critical results were called by Dr. Nataly Krishna MD to Drew Frost on   7/22/2019 at 00:13. CT Head WO Contrast   Preliminary Result   1. No acute intracranial abnormality. 2. Decreased vasogenic edema in the high left parietal region in the area of   previously seen mass lesion. Evaluation for mass lesion is limited without   intravenous contrast.         XR CHEST PORTABLE   Final Result   No acute process. EKG (if obtained):   12 lead EKG per my interpretation:  Normal Sinus Rhythm 90  Axis is   Left axis deviation  QTc is  within an acceptable range  There is no specific T wave changes appreciated. There is no specific ST wave changes appreciated. q wave on lead 3    Prior EKG to compare with was available 5/18/2018      Chart review shows recent radiographs:  Ct Abdomen Pelvis Wo Contrast Additional Contrast? Oral    Result Date: 7/17/2019  EXAMINATION: CT OF THE ABDOMEN AND PELVIS WITHOUT CONTRAST; CT OF THE CHEST WITHOUT CONTRAST 7/15/2019 9:58 am TECHNIQUE: CT of the abdomen and pelvis was performed without the administration of intravenous contrast. Multiplanar reformatted images are provided for review. Dose modulation, iterative reconstruction, and/or weight based adjustment of the mA/kV was utilized to reduce the radiation dose to as low as reasonably achievable.; CT of the chest was performed without the administration of intravenous contrast. Multiplanar reformatted images are provided for review. Dose modulation, iterative reconstruction, and/or weight based adjustment of the mA/kV was utilized to reduce the radiation dose to as low as reasonably achievable.  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 The heart size within normal limits. Mitral valvular calcifications. No pericardial effusion. Thoracic aorta is normal in caliber. The main pulmonary artery is normal caliber. Coronary arterial calcifications are noted. The esophagus is unremarkable. Lungs/pleura: Bibasilar scarring with associated calcifications. No focal consolidation, pleural effusion or pneumothorax. No pleural effusion or pneumothorax. No suspicious pulmonary nodules. Central airways are patent. Stable 3 mm nodule in the left upper lobe anteriorly. Soft Tissues/Bones: Right-sided chest port in place with the tip terminating in the distal SVC. No acute osseous abnormality. Degenerative changes of thoracic spine. Abdomen/Pelvis: Organs: The unenhanced liver, spleen, pancreas, gallbladder and adrenal glands are without focal abnormality. No renal or ureteral calculus. No hydronephrosis or perinephric stranding. The kidneys are lobular in contour. GI/Bowel: No dilated loops of bowel or bowel wall thickening. Colonic diverticulosis without acute diverticulitis. No extraluminal contrast or free air. The appendix is normal. Pelvis: Postsurgical changes from cysto prostatectomy with an ileal conduit in the right lower quadrant. Calcifications are noted along the posterior aspect of the conduit which may reflect stones or suture material.  No pelvic adenopathy or free fluid. Peritoneum/Retroperitoneum: The aorta is normal caliber with mild atherosclerosis. No retroperitoneal or mesenteric adenopathy. No ascites or drainable fluid collection.  Bones/Soft Tissues: No acute osseous abnormality. Foci of soft tissue gas are noted within the anterior subcutaneous soft tissues and may be related to injection sites or recent procedure. Postsurgical changes from cysto prostatectomy. Calcification along the posterior aspect favoring suture material over stones. Stable 3 mm left upper lobe pulmonary nodule which can be followed on subsequent exams. Otherwise no findings of metastatic disease in the chest, abdomen or pelvis. Foci of soft tissue gas are noted in the anterior subcutaneous soft tissues either related to recent injection sites and/or procedure. Ct Head Wo Contrast    1. No acute intracranial abnormality. 2. Decreased vasogenic edema in the high left parietal region in the area of previously seen mass lesion. Evaluation for mass lesion is limited without intravenous contrast.     Ct Head Wo Contrast    Result Date: 7/2/2019  CT HEAD WO CONTRAST Indication: Fall, head injury. Comparison: MRI brain 6/19/19 Technique:  CT head was performed without intravenous contrast. Coronal and sagittal reformations obtained. Up-to-date CT equipment and radiation dose reduction techniques were employed. Findings: There is no evidence of acute intracranial hemorrhage or extra-axial fluid collection. Redemonstration of localized vasogenic edema in the left frontoparietal lobe at the site of known metastasis, similar to the comparison MRI. No new or increasing mass effect. There is no midline shift. Mild hypoattenuation in the deep periventricular white matter nonspecific but compatible with chronic small vessel ischemic disease. Ventricles are normal in size. Basal cisterns are patent. Atherosclerotic calcifications the distal internal carotid arteries. The calvarium is intact. Paranasal sinuses and mastoid air cells are clear. 1.  No acute intracranial hemorrhage. 2.  Localized vasogenic edema from known left frontoparietal lobe metastasis without significant change.     Ct Chest Wo !Visualized! Compressibility! Thrombosis! +------------------------+----------+---------------+----------+ ! Sapheno Femoral Junction! Yes       ! No             !Acute     ! +------------------------+----------+---------------+----------+ ! GSV Thigh               ! Yes       ! No             !Acute     ! +------------------------+----------+---------------+----------+ ! Common Femoral          !Yes       ! No             !Acute     ! +------------------------+----------+---------------+----------+ ! Femoral                 !Yes       ! No             !Acute     ! +------------------------+----------+---------------+----------+ ! Prox Femoral            !Yes       ! No             !Acute     ! +------------------------+----------+---------------+----------+ ! Mid Femoral             !Yes       ! No             !Acute     ! +------------------------+----------+---------------+----------+ ! Dist Femoral            !Yes       ! No             !Acute     ! +------------------------+----------+---------------+----------+ ! Deep Femoral            !Yes       ! No             !Acute     ! +------------------------+----------+---------------+----------+ ! Popliteal               !Yes       ! No             !Acute     ! +------------------------+----------+---------------+----------+ ! GSV Below Knee          ! Yes       ! No             !Acute     ! +------------------------+----------+---------------+----------+ ! Gastroc                 ! Yes       ! No             !Acute     ! +------------------------+----------+---------------+----------+ ! Soleal                  !Yes       ! Yes            ! None      ! +------------------------+----------+---------------+----------+ ! PTV                     ! Yes       ! No             !Acute     ! +------------------------+----------+---------------+----------+ ! Ubaldo                !Yes       ! No             !Acute     ! +------------------------+----------+---------------+----------+ ! DANE Calf

## 2019-07-22 NOTE — CONSULTS
96.6                07/21/2019                 MCH                      32.9                07/21/2019                 MCHC                     34.0                07/21/2019                 RDW                      14.6                07/21/2019                 SEGSPCT                  82.5                07/21/2019                 LYMPHOPCT                8.4                 07/21/2019                 MONOPCT                  7.7                 07/21/2019                 BASOPCT                  0.1                 07/21/2019                 MONOSABS                 0.5                 07/21/2019                 LYMPHSABS                0.6                 07/21/2019                 EOSABS                   0.0                 07/21/2019                 BASOSABS                 0.0                 07/21/2019                 DIFFTYPE                                     07/21/2019             AUTOMATED DIFFERENTIAL  CMP:  Lab Results       Component                Value               Date                       NA                       137                 07/21/2019                 K                        3.7                 07/21/2019                 CL                       99                  07/21/2019                 CO2                      22                  07/21/2019                 BUN                      21                  07/21/2019                 CREATININE               1.2                 07/21/2019                 GFRAA                    >60                 07/21/2019                 LABGLOM                  60                  07/21/2019                 GLUCOSE                  166                 07/21/2019                 PROT                     6.0                 07/21/2019                 LABALBU                  3.4                 07/21/2019                 CALCIUM                  8.7                 07/21/2019                 BILITOT                  0.5 07/21/2019                 ALKPHOS                  60                  07/21/2019                 AST                      26                  07/21/2019                 ALT                      31                  07/21/2019            BMP:  Lab Results       Component                Value               Date                       NA                       137                 07/21/2019                 K                        3.7                 07/21/2019                 CL                       99                  07/21/2019                 CO2                      22                  07/21/2019                 BUN                      21                  07/21/2019                 LABALBU                  3.4                 07/21/2019                 CREATININE               1.2                 07/21/2019                 CALCIUM                  8.7                 07/21/2019                 GFRAA                    >60                 07/21/2019                 LABGLOM                  60                  07/21/2019                 GLUCOSE                  166                 07/21/2019            PT/INR:  Lab Results       Component                Value               Date                       PROTIME                  11.4                06/26/2018                 INR                      0.98                06/26/2018            PTT:  Lab Results       Component                Value               Date                       APTT                     27.7                06/26/2018          (APTT  U/A:  Lab Results       Component                Value               Date                       COLORU                   ROSEANNE               07/21/2019                 WBCUA                    16                  07/21/2019                 RBCUA                    2                   07/21/2019                 MUCUS                    RARE                07/21/2019                 TRICHOMONAS              NONE SEEN 07/21/2019                 BACTERIA                 FEW                 07/21/2019                 CLARITYU                 HAZY                07/21/2019                 SPECGRAV                 1.015               07/21/2019                 LEUKOCYTESUR             SMALL               07/21/2019                 UROBILINOGEN             1                   07/21/2019                 BILIRUBINUR              NEGATIVE            07/21/2019                 BLOODU                   NEGATIVE            07/21/2019            TSH:  No results found for: TSH  VITAMIN B12: No components found for: B12  FOLATE:  No results found for: FOLATE  RPR:  No results found for: RPR  MACKENZIE:  No results found for: ANATITER, MACKENZIE  Urine Toxicology:  No components found for: IAMMENTA, IBARBIT, IBENZO, ICOCAINE, IMARTHC, IOPIATES, IPHENCYC     IMPRESSION:    Syncopy- ? Seizure    Left Parietal Vasogenic edema-Mri to reassess-pt being followed by [facility] neurosurgeon    Hx brain mets s/p XRT-Gamma knife surgery    Hx Bladder CA    PLAN:    CT brain as above    Mri brain with Contrast    C doppler    Consult Hem Onc    Increase keppra    Increase decadron    Orthostatic Bp Hr    pts wife wants him to be followed by  [facility] and the neurologist sen by him before and I will not be following him as out patient. Adi Vicente MD  BOARD CERTIFIED-NEUROLOGY.

## 2019-07-22 NOTE — PROGRESS NOTES
rate  ABDOMEN: Non tender, non distended, no HSM noted. MUSCULOSKELETAL:  ROM limited  NEUROLOGIC: Alert and Oriented,  Cranial nerves II-XII are grossly intact.    SKIN:  no bruising or bleeding, normal skin color,  no redness      Data:       CBC   Recent Labs     07/21/19  2226   WBC 7.0   HGB 11.7*   HCT 34.4*         BMP Recent Labs     07/21/19  2226      K 3.7   CL 99   CO2 22   BUN 21   CREATININE 1.2         Electronically signed by Anurag Cantu MD on 7/22/2019 at 10:03 AM

## 2019-07-22 NOTE — H&P
TECHNOLOGIST PROVIDED HISTORY:   Reason for exam:->unresponsiveness   Reason for Exam: unresponsiveness   Acuity: Acute   Type of Exam: Initial   Mechanism of Injury: unresponsiveness   Relevant Medical/Surgical History: unresponsiveness       FINDINGS:   The lungs are mildly underinflated.  A right internal jugular port catheter   tip terminates in the superior vena cava.  No focal consolidation or pleural   effusion.  No semi-upright evidence of pneumothorax.  The cardiac silhouette   and mediastinal contours are within normal limits.  No acute bony abnormality.           Impression   No acute process. Narrative   EXAM: MRI BRAIN W WO CONTRAST   6/21/19       INDICATION: Brain metastasis       COMPARISON: None       TECHNIQUE: Axial T2 3-D thin section pulse sequence and pre and postcontrast axial MPRAGE sequences of the brain were obtained with fiducials in place for the purposes of gamma knife treatment planning. IV contrast: 40 mL ProHance       FINDINGS:       VENTRICLES: Normal size and configuration for patient age.       BRAIN PARENCHYMA: There is a 1.6 x 1.1 x 1.8 (AP by transverse by craniocaudal) cm intra-axial enhancing mass which appears centered at the parasagittal left precentral gyrus. There is vasogenic edema.       INTRACRANIAL VASCULATURE: Major intracranial vessels are grossly patent.       ORBITS: Normal.       BONE MARROW: Bone marrow signal within normal limits.       PARANASAL SINUSES/MASTOID BONES: No acute sinusitis or mastoiditis.       EXTRACRANIAL SOFT TISSUES: Fiducials are present.           Impression       Left precentral gyrus 1.6 x 1.1 cm metastasis       Relevant labs and imaging reviewed    ASSESSMENT AND PLAN     1. Acute metabolic encephalopathy:  -Unclear reason. Work-up negative so far. -Head CT negative, chest x-ray did not reveal any acute process. -Vitals within normal limits.  -Urinalysis not very impressive for UTI.   Patient received Rocephin in the

## 2019-07-22 NOTE — PROGRESS NOTES
Nutrition Assessment    Type and Reason for Visit: Initial, Positive Nutrition Screen(wt loss, poor intake)    Nutrition Recommendations:   Continue current diet  Begin low peggy high protein oral nutrition supplement bid, between meals as needed to optimize intake dos  SLP eval should safe swallowing be a concern with AMS    Nutrition Assessment: High nutrition risk with wt loss and poor intake reported with bladder CA and brain mets. Admit with AMS, now resolved noted. No po intake recorded yet. Pt n/a during room visit x 2.     Malnutrition Assessment:  · Malnutrition Status: Insufficient data    Nutrition Risk Level: High    Nutrient Needs:  · Estimated Daily Total Kcal: 1738-7681 (28-32 kcal/kg)  · Estimated Daily Protein (g): 108-126 (1.2-1.4 g/kg)  · Estimated Daily Total Fluid (ml/day): 4330-9525 (1 ml/kcal)    Nutrition Diagnosis:   · Problem: Predicted suboptimal energy intake  · Etiology: related to Catabolic illness     Signs and symptoms:  as evidenced by Diet history of poor intake, Weight loss    Objective Information:  · Wound Type: None  · Current Nutrition Therapies:  · Oral Diet Orders: Cardiac, 2gm Sodium   · Oral Diet intake: Unable to assess  · Oral Nutrition Supplement (ONS) Orders: None  · Anthropometric Measures:  · Ht: 5' 11\" (180.3 cm)   · Current Body Wt: 198 lb 9.6 oz (90.1 kg)  · Admission Body Wt: 198 lb 9.6 oz (90.1 kg)  · Usual Body Wt: 204 lb 12.8 oz (92.9 kg)(6/19/19)  · % Weight Change:   -3% in 1 month, is not clinically significant for malnutrition  · Ideal Body Wt: 172 lb (78 kg), % Ideal Body 115  · BMI Classification: BMI 25.0 - 29.9 Overweight(27.8)    Nutrition Interventions:   Continue current diet, Start ONS  Continued Inpatient Monitoring, Education not appropriate at this time, Coordination of Care    Nutrition Evaluation:   · Evaluation: Goals set   · Goals:   Patient will meet at least 75% of estimated nutrient needs during los with meals and supplements provided · Monitoring: Meal Intake, Supplement Intake, Diet Tolerance, Mental Status/Confusion, Pertinent Labs, Weight      Electronically signed by [facility] Staff, RD, LD on 7/22/19 at 12:50 PM    Contact Number: [id number]

## 2019-07-22 NOTE — PLAN OF CARE
Nutrition Problem: Predicted suboptimal energy intake  Intervention: Food and/or Nutrient Delivery: Continue current diet, Start ONS  Nutritional Goals:  Patient will meet at least 75% of estimated nutrient needs during los with meals and supplements provided

## 2019-07-23 LAB
ALBUMIN SERPL-MCNC: 3.1 GM/DL (ref 3.4–5)
ALP BLD-CCNC: 51 IU/L (ref 40–128)
ALT SERPL-CCNC: 32 U/L (ref 10–40)
ANION GAP SERPL CALCULATED.3IONS-SCNC: 12 MMOL/L (ref 4–16)
AST SERPL-CCNC: 35 IU/L (ref 15–37)
BASOPHILS ABSOLUTE: 0 K/CU MM
BASOPHILS RELATIVE PERCENT: 0.2 % (ref 0–1)
BILIRUB SERPL-MCNC: 0.6 MG/DL (ref 0–1)
BUN BLDV-MCNC: 15 MG/DL (ref 6–23)
CALCIUM SERPL-MCNC: 8.2 MG/DL (ref 8.3–10.6)
CHLORIDE BLD-SCNC: 101 MMOL/L (ref 99–110)
CO2: 25 MMOL/L (ref 21–32)
CREAT SERPL-MCNC: 0.8 MG/DL (ref 0.9–1.3)
DIFFERENTIAL TYPE: ABNORMAL
EOSINOPHILS ABSOLUTE: 0 K/CU MM
EOSINOPHILS RELATIVE PERCENT: 0.3 % (ref 0–3)
GFR AFRICAN AMERICAN: >60 ML/MIN/1.73M2
GFR NON-AFRICAN AMERICAN: >60 ML/MIN/1.73M2
GLUCOSE BLD-MCNC: 130 MG/DL (ref 70–99)
HCT VFR BLD CALC: 29 % (ref 42–52)
HEMOGLOBIN: ABNORMAL GM/DL (ref 13.5–18)
IMMATURE NEUTROPHIL %: 0.8 % (ref 0–0.43)
LYMPHOCYTES ABSOLUTE: 0.5 K/CU MM
LYMPHOCYTES RELATIVE PERCENT: 7 % (ref 24–44)
MCH RBC QN AUTO: 31.6 PG (ref 27–31)
MCHC RBC AUTO-ENTMCNC: 32.8 % (ref 32–36)
MCV RBC AUTO: 96.3 FL (ref 78–100)
MONOCYTES ABSOLUTE: 0.3 K/CU MM
MONOCYTES RELATIVE PERCENT: 5.3 % (ref 0–4)
NUCLEATED RBC %: 0.3 %
PDW BLD-RTO: 14.8 % (ref 11.7–14.9)
PLATELET # BLD: 161 K/CU MM (ref 140–440)
PMV BLD AUTO: 9.6 FL (ref 7.5–11.1)
POTASSIUM SERPL-SCNC: 4 MMOL/L (ref 3.5–5.1)
RBC # BLD: 3.01 M/CU MM (ref 4.6–6.2)
SEGMENTED NEUTROPHILS ABSOLUTE COUNT: 5.6 K/CU MM
SEGMENTED NEUTROPHILS RELATIVE PERCENT: 86.4 % (ref 36–66)
SODIUM BLD-SCNC: 138 MMOL/L (ref 135–145)
TOTAL IMMATURE NEUTOROPHIL: 0.05 K/CU MM
TOTAL NUCLEATED RBC: 0 K/CU MM
TOTAL PROTEIN: 5.2 GM/DL (ref 6.4–8.2)
WBC # BLD: 6.4 K/CU MM (ref 4–10.5)

## 2019-07-23 PROCEDURE — 97530 THERAPEUTIC ACTIVITIES: CPT

## 2019-07-23 PROCEDURE — 6360000002 HC RX W HCPCS: Performed by: PSYCHIATRY & NEUROLOGY

## 2019-07-23 PROCEDURE — 96372 THER/PROPH/DIAG INJ SC/IM: CPT

## 2019-07-23 PROCEDURE — 99220 PR INITIAL OBSERVATION CARE/DAY 70 MINUTES: CPT | Performed by: INTERNAL MEDICINE

## 2019-07-23 PROCEDURE — G0378 HOSPITAL OBSERVATION PER HR: HCPCS

## 2019-07-23 PROCEDURE — 36415 COLL VENOUS BLD VENIPUNCTURE: CPT

## 2019-07-23 PROCEDURE — 80053 COMPREHEN METABOLIC PANEL: CPT

## 2019-07-23 PROCEDURE — 97167 OT EVAL HIGH COMPLEX 60 MIN: CPT

## 2019-07-23 PROCEDURE — 6370000000 HC RX 637 (ALT 250 FOR IP): Performed by: INTERNAL MEDICINE

## 2019-07-23 PROCEDURE — 97162 PT EVAL MOD COMPLEX 30 MIN: CPT

## 2019-07-23 PROCEDURE — 6370000000 HC RX 637 (ALT 250 FOR IP): Performed by: PSYCHIATRY & NEUROLOGY

## 2019-07-23 PROCEDURE — 94761 N-INVAS EAR/PLS OXIMETRY MLT: CPT

## 2019-07-23 PROCEDURE — 6360000002 HC RX W HCPCS: Performed by: INTERNAL MEDICINE

## 2019-07-23 PROCEDURE — 85025 COMPLETE CBC W/AUTO DIFF WBC: CPT

## 2019-07-23 RX ORDER — DEXAMETHASONE 4 MG/1
2 TABLET ORAL DAILY
Status: DISCONTINUED | OUTPATIENT
Start: 2019-07-24 | End: 2019-07-28

## 2019-07-23 RX ADMIN — GABAPENTIN 300 MG: 300 CAPSULE ORAL at 21:01

## 2019-07-23 RX ADMIN — DRONABINOL 5 MG: 2.5 CAPSULE ORAL at 15:34

## 2019-07-23 RX ADMIN — GABAPENTIN 300 MG: 300 CAPSULE ORAL at 11:05

## 2019-07-23 RX ADMIN — LOSARTAN POTASSIUM 50 MG: 50 TABLET, FILM COATED ORAL at 21:01

## 2019-07-23 RX ADMIN — PANTOPRAZOLE SODIUM 40 MG: 40 TABLET, DELAYED RELEASE ORAL at 06:12

## 2019-07-23 RX ADMIN — MIRTAZAPINE 15 MG: 15 TABLET, ORALLY DISINTEGRATING ORAL at 21:01

## 2019-07-23 RX ADMIN — DRONABINOL 5 MG: 2.5 CAPSULE ORAL at 11:05

## 2019-07-23 RX ADMIN — DEXAMETHASONE 2 MG: 4 TABLET ORAL at 11:08

## 2019-07-23 RX ADMIN — GABAPENTIN 300 MG: 300 CAPSULE ORAL at 15:35

## 2019-07-23 RX ADMIN — ENOXAPARIN SODIUM 90 MG: 100 INJECTION SUBCUTANEOUS at 21:02

## 2019-07-23 RX ADMIN — Medication 1 CAPSULE: at 11:06

## 2019-07-23 RX ADMIN — DEXAMETHASONE 4 MG: 4 TABLET ORAL at 00:46

## 2019-07-23 RX ADMIN — LEVETIRACETAM 750 MG: 500 TABLET, FILM COATED ORAL at 11:05

## 2019-07-23 RX ADMIN — LEVETIRACETAM 750 MG: 500 TABLET, FILM COATED ORAL at 21:01

## 2019-07-23 RX ADMIN — METOPROLOL SUCCINATE 25 MG: 25 TABLET, EXTENDED RELEASE ORAL at 11:05

## 2019-07-23 RX ADMIN — ENOXAPARIN SODIUM 90 MG: 100 INJECTION SUBCUTANEOUS at 11:07

## 2019-07-23 NOTE — CONSULTS
all other mm groups in function. · Neuro:  [facility]      Mobility:  · Rolling L/R:  CGA  · Supine to sit:  CGA--increased time and effort  · Transfers: mod A with and without device. Cues for momentum and ant trunk lean. Cues for hand placement. Mod A trunk boost, pt averse to right LE WB, very painful. Difficulty with right WB with pivoting   · Sitting balance:  Supervision . · Standing balance: CGA static balance, min A with dynamic    · Gait: unable, lacking right stance tolerance     [facility] 6 Clicks Inpatient Mobility:   Current: 13/24  PLOF: 24/24    Treatment:  Therapeutic Activity Training:   Therapeutic activity training was instructed today. Cues were given for safety, sequence, UE/LE placement, awareness, and balance. Activities performed today included bed mobility training, sup-sit, sit-stand, SPT. Safety: patient left in chair with chair, call light within reach, RN notified, gait belt used. Assessment:  Pt is a 78 yo male who presents to [facility] with acute metabolic encephalopathy. Pt with brain metastasis and gamma knife radiation 6/2019. 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 Pt is currently mod A for transfer and unable to ambulate. Orthostatics negative. There is increased caregiver burden at home and [facility] PT not capable of meeting increased needs. Pt will benefit from skilled PT and d/c to ARU when medically cleared  Activity Tolerance: good  Complexity: Moderate  Prognosis: Good, no significant barriers to participation at this time. Plan  days/week: 3+/ 1 week         Goals:  1. Pt will mobilize in bed sup<-> sit at SBA  2. Pt will sit<-> stand with LRAD at min A   3. Pt will ambulate 10 ft with LRAD at min A, no LOB or R LE instability   4.  Pt will stand with symmetrical WB for 1+ min at LRAD      Treatment plan:  Bed mobility, transfers, balance, gait, TA, TX, device training, safety education,

## 2019-07-23 NOTE — CARE COORDINATION
LSW went to pt room to talk about discharge plans. PT/OT had just finished their evals and they informed this LSW that they are recommending ARU. Pt and wife agreeable to ARU. Pt has [employer] and will need precert. Pt will also have to meet medically criteria to go to ARU. LSW made referral to [facility] with ARU.

## 2019-07-23 NOTE — CONSULTS
noncontrast exam.  There is decreased vasogenic edema in this region compared to the prior study. No noncontrast evidence of new mass lesion. No abnormal extra-axial fluid collection. No evidence of recent territorial infarct. There are nonspecific areas of hypoattenuation in the periventricular white matter and centrum semiovale that are likely related to chronic small vessel ischemic disease. The ventricles and cisternal spaces are prominent consistent with cerebral atrophy. There is no evidence of hydrocephalus. There is intracranial atherosclerosis. ORBITS: The visualized portion of the orbits demonstrate no acute abnormality. SINUSES: The visualized paranasal sinuses and mastoid air cells demonstrate no acute abnormality. SOFT TISSUES/SKULL:  No acute abnormality of the visualized skull or soft tissues. 1. No acute intracranial abnormality. 2. Decreased vasogenic edema in the high left parietal region in the area of previously seen mass lesion. Evaluation for mass is limited without intravenous contrast.     Ct Head Wo Contrast    Result Date: 7/2/2019  CT HEAD WO CONTRAST Indication: Fall, head injury. Comparison: MRI brain 6/19/19 Technique:  CT head was performed without intravenous contrast. Coronal and sagittal reformations obtained. Up-to-date CT equipment and radiation dose reduction techniques were employed. Findings: There is no evidence of acute intracranial hemorrhage or extra-axial fluid collection. Redemonstration of localized vasogenic edema in the left frontoparietal lobe at the site of known metastasis, similar to the comparison MRI. No new or increasing mass effect. There is no midline shift. Mild hypoattenuation in the deep periventricular white matter nonspecific but compatible with chronic small vessel ischemic disease. Ventricles are normal in size. Basal cisterns are patent. Atherosclerotic calcifications the distal internal carotid arteries. The calvarium is intact.  Paranasal 7/21/2019  EXAMINATION: ONE XRAY VIEW OF THE CHEST 7/21/2019 10:12 pm COMPARISON: None. HISTORY: ORDERING SYSTEM PROVIDED HISTORY: unresponsiveness TECHNOLOGIST PROVIDED HISTORY: Reason for exam:->unresponsiveness Reason for Exam: unresponsiveness Acuity: Acute Type of Exam: Initial Mechanism of Injury: unresponsiveness Relevant Medical/Surgical History: unresponsiveness FINDINGS: The lungs are mildly underinflated. A right internal jugular port catheter tip terminates in the superior vena cava. No focal consolidation or pleural effusion. No semi-upright evidence of pneumothorax. The cardiac silhouette and mediastinal contours are within normal limits. No acute bony abnormality. No acute process. Vl Dup Carotid Bilateral    Result Date: 7/22/2019  EXAMINATION: ULTRASOUND EVALUATION OF THE CAROTID ARTERIES 7/22/2019 COMPARISON: None. HISTORY: ORDERING SYSTEM PROVIDED HISTORY: r/o carotid stenosis TECHNOLOGIST PROVIDED HISTORY: Reason for exam:->r/o carotid stenosis Reason for Exam: TIA Type of Exam: Initial FINDINGS: RIGHT: The right common carotid artery demonstrates peak systolic velocities of 83, 73 cm/sec in the proximal and distal segments respectively. The right internal carotid artery demonstrates the systolic velocities of 643, 79, 89 cm/sec in the proximal, mid and distal segments respectively. The external carotid artery is patent. The vertebral artery demonstrates normal antegrade flow. Plaque seen in the proximal internal carotid artery ICA/CCA ratio of 1.8. LEFT: The left common carotid artery demonstrates peak systolic velocities of 82, 79 cm/sec in the proximal and distal segments respectively. The left internal carotid artery demonstrates the systolic velocities of 84, 76, 87 cm/sec in the proximal, mid and distal segments respectively. The external carotid artery is patent. The vertebral artery demonstrates normal antegrade flow.  Plaque seen in the proximal internal and external carotid arteries ICA/CCA ratio of 1.1. The right internal carotid artery demonstrates 0-50% stenosis . The left internal carotid artery demonstrates 0-50% stenosis . Bilateral vertebral arteries are patent with flow in the normal direction. Vl Extremity Venous Right    Result Date: 7/2/2019  Lower Extremities DVT Study  Demographics   Patient Name       Vielka Mckinnon   Date of Study      07/02/2019         Gender              Male   Patient Number     [id number]         Date of Birth       1950   Visit Number       [id number]          Age                 77 year(s)   Accession Number   [id number]          Room Number            Corporate ID       [id number]           Sonographer         Aster Jewell, RVT,                                                            [facility]   Ordering Physician Rudy Gan [facility]                     MD                 Physician           Readers                                                            Stella Bloom MD  Procedure Type of Study:   Veins:Lower Extremities DVT Study, VL EXTREMITY VENOUS DUPLEX RIGHT. Vascular Sonographer Report  Indications for Study:Edema. Additional Indications:Patient presents for intermittent swelling, redness, weakness, and loss of coordination of the right lower extremity since February 2019. He has also had multiple falls. He denies a prior H/O DVT. He is currently in treatment for brain cancer. Venous Duplex Scan: B-mode imaging of the deep and superficial veins, with compression maneuvers, including color and Doppler spectral waveform analysis. Impressions Right Impression There is extensive, acute, totally occluding deep venous thrombosis involving the common femoral, deep femoral, femoral, popliteal, gastrocnemius, posterior tibial, and peroneal veins.  There is acute totally occluding superficial venous thrombosis involving the great imaged veins of the lower extremity including the common femoral, superficial femoral, popliteal posterior tibial and anterior tibial veins. The peroneal vein is not seen. Extensive deep VT throughout the entire right leg from the common femoral vein through the calf. Critical results were called by Dr. Alexis Aldridge MD to [facility] on 7/22/2019 at 00:13. Mri Brain W Wo Contrast    Result Date: 7/22/2019  EXAMINATION: MRI OF THE BRAIN WITHOUT AND WITH CONTRAST  7/22/2019 6:41 pm TECHNIQUE: Multiplanar multisequence MRI of the head/brain was performed without and with the administration of intravenous contrast. COMPARISON: MRI from 06/06/2019 HISTORY: ORDERING SYSTEM PROVIDED HISTORY: SYNCOPE/FAINTING TECHNOLOGIST PROVIDED HISTORY: Reason for Exam: pt has hx of brain mets with gamma knife and radiation - new onset of speech diff and episode of unresponsiveness  -gfr >60 19 mlprohance FINDINGS: The examination is motion degraded. INTRACRANIAL STRUCTURES/VENTRICLES:  The homogeneously enhancing mass in the left paracentral lobule has decreased in size from 1.9 x 1.6 cm to 1.6 x 1.1 cm. There is no acute hemorrhage, herniation, hydrocephalus, infarct. There is no new abnormal area of enhancement. Areas of T2 hyperintensity in the periventricular white matter may be related to chronic microvascular ischemic changes or radiation therapy. ORBITS: The visualized portion of the orbits demonstrate no acute abnormality. SINUSES: The visualized paranasal sinuses and mastoid air cells are well aerated. BONES/SOFT TISSUES: The bone marrow signal intensity appears normal. The soft tissues demonstrate no acute abnormality. No acute intracranial abnormality. Decreased size of left parietal brain mass. All labs, medications and tests reviewed by myself including data  from outside source , patient and available family . Continue all other medications of all above medical condition listed as is.

## 2019-07-23 NOTE — PROGRESS NOTES
to evaluation. Significant other present and supportive. · Vision: WFL (Glasses)  · Hearing: [facility]  · Vitals:  Stable vitals throughout session; BP of 115/74 in supine, 104/66 sitting EOB, 118/78 standing    Body Systems and functions:  · ROM R/L: WNL all joints in BL UEs  · Strength R/L: 4+/5 MMT all major muscle groups BL UEs  · Sensation: WFL (denies numbness/tingling)  · Tone: Normal  · Coordination: WFL  · Perception: WNL    Activities of Daily Living (ADLs):  · Feeding: Independent   · Grooming: SBA (seated facial hygiene, unable to complete in standing at this time)  · UB bathing: SBA  · LB bathing: Mod A (reaching distal LEs/buttocks)  · UB dressing: CGA (light dynamic sitting balance with donning robe EOB)  · LB dressing: Dependent (donning BL socks)  · Toileting: Dependent (Shabazz in place, anticipate dependent level of assist required for a bowel movement)    Cognitive and Psychosocial Functioning:  · Overall cognitive status: WFL (mildly decreased insight/safety awareness)  · Affect: Normal     Balance:   · Sitting: SBA static sitting, CGA light dynamic sitting  · Standing: Min A with RW; able to tolerate 60 minutes of static standing    Functional Mobility:  · Bed Mobility: SBA supine to sitting EOB (HOB elevated to 30', utilized partial log roll technique, min cues for use of bed features)  · Transfers: Mod A sit to stand from bed, Mod A stand pivot transfer bed to chair (min cues for safe hand placement/technique with each transfer)  · Ambulation: Unsafe/unable to attempt this date      AM-PAC 6 click short form for inpatient daily activity:   How much help from another person does the patient currently need. .. Unable  Dep A Lot  Max A A Lot   Mod A A Little  Min A A Little   CGA  SBA None   Mod I  Indep  Sup   1. Putting on and taking off regular lower body clothing? [x] 1    [] 2   [] 2   [] 3   [] 3   [] 4      2. Bathing (including washing, rinsing, drying)? [] 1   [] 2   [x] 2 [] 3 [] 3 [] 4   3.

## 2019-07-24 PROCEDURE — 87086 URINE CULTURE/COLONY COUNT: CPT

## 2019-07-24 PROCEDURE — G0378 HOSPITAL OBSERVATION PER HR: HCPCS

## 2019-07-24 PROCEDURE — APPSS30 APP SPLIT SHARED TIME 16-30 MINUTES: Performed by: NURSE PRACTITIONER

## 2019-07-24 PROCEDURE — 6370000000 HC RX 637 (ALT 250 FOR IP): Performed by: PSYCHIATRY & NEUROLOGY

## 2019-07-24 PROCEDURE — 6360000002 HC RX W HCPCS: Performed by: PSYCHIATRY & NEUROLOGY

## 2019-07-24 PROCEDURE — 97530 THERAPEUTIC ACTIVITIES: CPT

## 2019-07-24 PROCEDURE — 99224 PR SBSQ OBSERVATION CARE/DAY 15 MINUTES: CPT | Performed by: INTERNAL MEDICINE

## 2019-07-24 PROCEDURE — 96372 THER/PROPH/DIAG INJ SC/IM: CPT

## 2019-07-24 PROCEDURE — 94761 N-INVAS EAR/PLS OXIMETRY MLT: CPT

## 2019-07-24 PROCEDURE — 6370000000 HC RX 637 (ALT 250 FOR IP): Performed by: INTERNAL MEDICINE

## 2019-07-24 PROCEDURE — 6360000002 HC RX W HCPCS: Performed by: INTERNAL MEDICINE

## 2019-07-24 PROCEDURE — 97110 THERAPEUTIC EXERCISES: CPT

## 2019-07-24 RX ADMIN — GABAPENTIN 300 MG: 300 CAPSULE ORAL at 22:27

## 2019-07-24 RX ADMIN — LOSARTAN POTASSIUM 50 MG: 50 TABLET, FILM COATED ORAL at 22:27

## 2019-07-24 RX ADMIN — ENOXAPARIN SODIUM 90 MG: 100 INJECTION SUBCUTANEOUS at 22:26

## 2019-07-24 RX ADMIN — LEVETIRACETAM 750 MG: 500 TABLET, FILM COATED ORAL at 10:25

## 2019-07-24 RX ADMIN — GABAPENTIN 300 MG: 300 CAPSULE ORAL at 13:45

## 2019-07-24 RX ADMIN — MIRTAZAPINE 15 MG: 15 TABLET, ORALLY DISINTEGRATING ORAL at 22:28

## 2019-07-24 RX ADMIN — DEXAMETHASONE 2 MG: 4 TABLET ORAL at 10:25

## 2019-07-24 RX ADMIN — ENOXAPARIN SODIUM 90 MG: 100 INJECTION SUBCUTANEOUS at 13:45

## 2019-07-24 RX ADMIN — Medication 1 CAPSULE: at 10:25

## 2019-07-24 RX ADMIN — DRONABINOL 5 MG: 2.5 CAPSULE ORAL at 16:53

## 2019-07-24 RX ADMIN — GABAPENTIN 300 MG: 300 CAPSULE ORAL at 10:26

## 2019-07-24 RX ADMIN — DRONABINOL 5 MG: 2.5 CAPSULE ORAL at 10:25

## 2019-07-24 RX ADMIN — METOPROLOL SUCCINATE 25 MG: 25 TABLET, EXTENDED RELEASE ORAL at 10:25

## 2019-07-24 RX ADMIN — LEVETIRACETAM 750 MG: 500 TABLET, FILM COATED ORAL at 22:27

## 2019-07-24 ASSESSMENT — PAIN SCALES - GENERAL
PAINLEVEL_OUTOF10: 0
PAINLEVEL_OUTOF10: 0

## 2019-07-24 NOTE — PROGRESS NOTES
Intact distal pulses, No edema, No tenderness, No cyanosis, No clubbing. Back- No tenderness. Integument:  Warm, Dry, No erythema, No rash. Lymphatic:  No lymphadenopathy noted. R leg is chronically swollen. Neurologic:  Alert & oriented x 3, Normal motor function, Normal sensory function, No focal deficits noted. Psychiatric:  Affect  and  Mood :no change    Telemetry Reviewed:   Sinus Rhythm, Rate 60    Medications:    dexamethasone  2 mg Oral Daily    dronabinol  5 mg Oral BID WC    gabapentin  300 mg Oral TID    lactobacillus  1 capsule Oral QAM    losartan  50 mg Oral Nightly    mirtazapine  15 mg Oral Nightly    enoxaparin  1 mg/kg Subcutaneous BID    metoprolol succinate  25 mg Oral Daily    levETIRAcetam  750 mg Oral BID       acetaminophen    Lab Data:  CBC:   Recent Labs     07/21/19 2226 07/23/19  0417   WBC 7.0 6.4   HGB 11.7* 9.5  HGB DECREASE CALLED TO GALI FITZPATRICK RN ON 7/23/19 AT 0459 BY DARRICK SHETH  RESULTS READ BACK  *   HCT 34.4* 29.0*   MCV 96.6 96.3    161     BMP:   Recent Labs     07/21/19 2226 07/23/19  0417    138   K 3.7 4.0   CL 99 101   CO2 22 25   BUN 21 15   CREATININE 1.2 0.8*     PT/INR: No results for input(s): PROTIME, INR in the last 72 hours. BNP:  No results for input(s): PROBNP in the last 72 hours. TROPONIN:   Recent Labs     07/21/19 2226   TROPONINT <0.010         All labs, medications and tests reviewed by myself , continue all other medications of all above medical condition listed as is except for changes mentioned above. Thank you very much for consult , please call with questions. Electronically signed by DAVID Sandoval CNP on 7/24/2019 at 2:16 PM        [address] [facility]    I have seen ,spoken to  and examined this patient personally, independently of the nurse practitioner. I have reviewed the hospital care given to date and reviewed all pertinent labs and imaging. The plan was developed mutually at the

## 2019-07-24 NOTE — PROGRESS NOTES
NEUROLOGY NOTE  DR. Hortencia Chaudhari MD.  -------------------------------------------------  Subjective:    Pt is able to tolerate loewer dose of decadron and is stable today    Pt states he feels better    Denies any new symptoms    Doing better. Denies any new symptoms. Denies headache nausea vomiting dizziness    Denies numbness weakness extremities    Denies blurring of vision double vision    Objective:    /67   Pulse 60   Temp 98.5 °F (36.9 °C) (Oral)   Resp 17   Ht 5' 11\" (1.803 m)   Wt 194 lb (88 kg)   SpO2 98%   BMI 27.06 kg/m²   HEENT nl      Neuro exam    Alert Oriented  X 3  Follow simple commands  EOMI Pupils 3 mm buddy  Moves extremtiies      RADIOLOGY  -----------------    Ct Abdomen Pelvis Wo Contrast Additional Contrast? Oral    Result Date: 7/17/2019  EXAMINATION: CT OF THE ABDOMEN AND PELVIS WITHOUT CONTRAST; CT OF THE CHEST WITHOUT CONTRAST 7/15/2019 9:58 am TECHNIQUE: CT of the abdomen and pelvis was performed without the administration of intravenous contrast. Multiplanar reformatted images are provided for review. Dose modulation, iterative reconstruction, and/or weight based adjustment of the mA/kV was utilized to reduce the radiation dose to as low as reasonably achievable.; CT of the chest was performed without the administration of intravenous contrast. Multiplanar reformatted images are provided for review. Dose modulation, iterative reconstruction, and/or weight based adjustment of the mA/kV was utilized to reduce the radiation dose to as low as reasonably achievable.  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 Multiplanar reformatted images are provided for review. Dose modulation, iterative reconstruction, and/or weight based adjustment of the mA/kV was utilized to reduce the radiation dose to as low as reasonably achievable. 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 The heart size within normal limits. Mitral valvular calcifications. No pericardial effusion. Thoracic aorta is normal in caliber. The main pulmonary artery is normal caliber. Coronary arterial calcifications are noted. The esophagus is unremarkable. Lungs/pleura: Bibasilar scarring with associated calcifications. No focal consolidation, pleural effusion or pneumothorax. No pleural effusion or pneumothorax. No suspicious pulmonary nodules. Central airways are patent. Stable 3 mm nodule in the left upper lobe anteriorly. Soft Tissues/Bones: Right-sided chest port in place with the tip terminating in the distal SVC. No acute osseous abnormality. Degenerative changes of thoracic spine. Abdomen/Pelvis: Organs:  The unenhanced liver, spleen, pancreas, gallbladder and adrenal glands effusion. No semi-upright evidence of pneumothorax. The cardiac silhouette and mediastinal contours are within normal limits. No acute bony abnormality. No acute process. Vl Dup Carotid Bilateral    Result Date: 7/22/2019  EXAMINATION: ULTRASOUND EVALUATION OF THE CAROTID ARTERIES 7/22/2019 COMPARISON: None. HISTORY: ORDERING SYSTEM PROVIDED HISTORY: r/o carotid stenosis TECHNOLOGIST PROVIDED HISTORY: Reason for exam:->r/o carotid stenosis Reason for Exam: TIA Type of Exam: Initial FINDINGS: RIGHT: The right common carotid artery demonstrates peak systolic velocities of 83, 73 cm/sec in the proximal and distal segments respectively. The right internal carotid artery demonstrates the systolic velocities of 371, 79, 89 cm/sec in the proximal, mid and distal segments respectively. The external carotid artery is patent. The vertebral artery demonstrates normal antegrade flow. Plaque seen in the proximal internal carotid artery ICA/CCA ratio of 1.8. LEFT: The left common carotid artery demonstrates peak systolic velocities of 82, 79 cm/sec in the proximal and distal segments respectively. The left internal carotid artery demonstrates the systolic velocities of 84, 76, 87 cm/sec in the proximal, mid and distal segments respectively. The external carotid artery is patent. The vertebral artery demonstrates normal antegrade flow. Plaque seen in the proximal internal and external carotid arteries ICA/CCA ratio of 1.1. The right internal carotid artery demonstrates 0-50% stenosis . The left internal carotid artery demonstrates 0-50% stenosis . Bilateral vertebral arteries are patent with flow in the normal direction.      Vl Extremity Venous Right    Result Date: 7/2/2019  Lower Extremities DVT Study  Demographics   Patient Name       Nate Drew   Date of Study      07/02/2019         Gender              Male   Patient Number     [id number]         Date of Birth       1950   Visit Number ------------------------------------------------------------------  Electronically signed by Rishi Bell MD (Interpreting  physician) on 07/02/2019 at 03:04 PM  ------------------------------------------------------------------  Patient Status:Routine. Study [address] [place] [address] - Vascular Lab. Technical Quality:Good visualization.   - Results were reported to:Analilia at Dr. Villarreal Files office [facility])     at 8:30 am on 7/2/19. Risk Factors History +---------+----+------------------------------------------------+ ! Diagnosis! Date! Comments                                        ! +---------+----+------------------------------------------------+ ! Other    ! !H/O bladder and brain cancer, OA, diverticulitis! +---------+----+------------------------------------------------+   - The patient's risk factor(s) include: arterial hypertension.   - The patient has a former tobacco history of 22 years . - The patient has cancer. Velocities are measured in cm/s ; Diameters are measured in mm Right Lower Extremities DVT Study Measurements Right 2D Measurements +------------------------+----------+---------------+----------+ ! Location                ! Visualized! Compressibility! Thrombosis! +------------------------+----------+---------------+----------+ ! Sapheno Femoral Junction! Yes       ! No             !Acute     ! +------------------------+----------+---------------+----------+ ! GSV Thigh               ! Yes       ! No             !Acute     ! +------------------------+----------+---------------+----------+ ! Common Femoral          !Yes       ! No             !Acute     ! +------------------------+----------+---------------+----------+ ! Femoral                 !Yes       ! No             !Acute     ! +------------------------+----------+---------------+----------+ ! Prox Femoral            !Yes       ! No             !Acute     ! +------------------------+----------+---------------+----------+ ! Mid Femoral !Yes       !No             !Acute     ! +------------------------+----------+---------------+----------+ ! Dist Femoral            !Yes       ! No             !Acute     ! +------------------------+----------+---------------+----------+ ! Deep Femoral            !Yes       ! No             !Acute     ! +------------------------+----------+---------------+----------+ ! Popliteal               !Yes       ! No             !Acute     ! +------------------------+----------+---------------+----------+ ! GSV Below Knee          ! Yes       ! No             !Acute     ! +------------------------+----------+---------------+----------+ ! Gastroc                 ! Yes       ! No             !Acute     ! +------------------------+----------+---------------+----------+ ! Soleal                  !Yes       ! Yes            ! None      ! +------------------------+----------+---------------+----------+ ! PTV                     ! Yes       ! No             !Acute     ! +------------------------+----------+---------------+----------+ ! Peroneal                !Yes       ! No             !Acute     ! +------------------------+----------+---------------+----------+ ! GSV Calf                ! Yes       ! Yes            ! None      ! +------------------------+----------+---------------+----------+ ! SSV                     ! Yes       ! No             !Acute     ! +------------------------+----------+---------------+----------+ Right Doppler Measurements +------------------------+------+------+------------+ ! Location                ! Signal!Reflux! Reflux (sec)! +------------------------+------+------+------------+ ! Sapheno Femoral Junction! Absent! No    !            ! +------------------------+------+------+------------+ ! Common Femoral          !Absent! No    !            ! +------------------------+------+------+------------+ ! Femoral                 !Absent! No    !            ! +------------------------+------+------+------------+ ! Prox Femoral

## 2019-07-24 NOTE — PROGRESS NOTES
filed at 7/24/2019 0607  Gross per 24 hour   Intake 960 ml   Output 1100 ml   Net -140 ml      Vitals:   Vitals:    07/24/19 1015   BP: 100/67   Pulse: 60   Resp: 17   Temp: 98.5 °F (36.9 °C)   SpO2: 98%     Physical Exam:   Gen:  awake, alert, cooperative, no apparent distress  Head/Eyes:  Normocephalic atraumatic, EOMI   NECK:   symmetrical, trachea midline  LUNGS: Normal Effort   CARDIOVASCULAR:  Normal rate  ABDOMEN: Non tender, non distended, no HSM noted. MUSCULOSKELETAL:  ROM WNL  NEUROLOGIC: Alert and Oriented,  Cranial nerves II-XII are grossly intact.    SKIN:  no bruising or bleeding, normal skin color,  no redness      Data:       CBC   Recent Labs     07/21/19 2226 07/23/19  0417   WBC 7.0 6.4   HGB 11.7* 9.5  HGB DECREASE CALLED TO GALI FITZPATRICK RN ON 7/23/19 AT 0459 BY DARRICK JARVIS [facility]  RESULTS READ BACK  *   HCT 34.4* 29.0*    161      BMP Recent Labs     07/21/19 2226 07/23/19  0417    138   K 3.7 4.0   CL 99 101   CO2 22 25   BUN 21 15   CREATININE 1.2 0.8*         Electronically signed by Ana Cadet MD on 7/24/2019 at 10:25 AM

## 2019-07-24 NOTE — PROGRESS NOTES
NEUROLOGY NOTE  DR. Camilo Henson MD.  -------------------------------------------------  Subjective:    Pt states he feels better    Denies any new symptoms    Doing better. Denies any new symptoms. Denies headache nausea vomiting dizziness    Denies numbness weakness extremities    Denies blurring of vision double vision    Objective:    /64   Pulse 74   Temp 98.5 °F (36.9 °C) (Oral)   Resp 17   Ht 5' 11\" (1.803 m)   Wt 196 lb 9.6 oz (89.2 kg)   SpO2 99%   BMI 27.42 kg/m²   HEENT nl      Neuro exam    Alert Oriented  X 3  Follow simple commands  EOMI Pupils 3 mm buddy  5/5 all 4 extremities      RADIOLOGY  -----------------    Ct Abdomen Pelvis Wo Contrast Additional Contrast? Oral    Result Date: 7/17/2019  EXAMINATION: CT OF THE ABDOMEN AND PELVIS WITHOUT CONTRAST; CT OF THE CHEST WITHOUT CONTRAST 7/15/2019 9:58 am TECHNIQUE: CT of the abdomen and pelvis was performed without the administration of intravenous contrast. Multiplanar reformatted images are provided for review. Dose modulation, iterative reconstruction, and/or weight based adjustment of the mA/kV was utilized to reduce the radiation dose to as low as reasonably achievable.; CT of the chest was performed without the administration of intravenous contrast. Multiplanar reformatted images are provided for review. Dose modulation, iterative reconstruction, and/or weight based adjustment of the mA/kV was utilized to reduce the radiation dose to as low as reasonably achievable.  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 No hydronephrosis or perinephric stranding. The kidneys are lobular in contour. GI/Bowel: No dilated loops of bowel or bowel wall thickening. Colonic diverticulosis without acute diverticulitis. No extraluminal contrast or free air. The appendix is normal. Pelvis: Postsurgical changes from cysto prostatectomy with an ileal conduit in the right lower quadrant. Calcifications are noted along the posterior aspect of the conduit which may reflect stones or suture material.  No pelvic adenopathy or free fluid. Peritoneum/Retroperitoneum: The aorta is normal caliber with mild atherosclerosis. No retroperitoneal or mesenteric adenopathy. No ascites or drainable fluid collection. Bones/Soft Tissues: No acute osseous abnormality. Foci of soft tissue gas are noted within the anterior subcutaneous soft tissues and may be related to injection sites or recent procedure. Postsurgical changes from cysto prostatectomy. Calcification along the posterior aspect favoring suture material over stones. Stable 3 mm left upper lobe pulmonary nodule which can be followed on subsequent exams. Otherwise no findings of metastatic disease in the chest, abdomen or pelvis. Foci of soft tissue gas are noted in the anterior subcutaneous soft tissues either related to recent injection sites and/or procedure. Xr Chest Portable    Result Date: 7/21/2019  EXAMINATION: ONE XRAY VIEW OF THE CHEST 7/21/2019 10:12 pm COMPARISON: None. HISTORY: ORDERING SYSTEM PROVIDED HISTORY: unresponsiveness TECHNOLOGIST PROVIDED HISTORY: Reason for exam:->unresponsiveness Reason for Exam: unresponsiveness Acuity: Acute Type of Exam: Initial Mechanism of Injury: unresponsiveness Relevant Medical/Surgical History: unresponsiveness FINDINGS: The lungs are mildly underinflated. A right internal jugular port catheter tip terminates in the superior vena cava. No focal consolidation or pleural effusion.   No semi-upright evidence of Gap 12 4 - 16   CBC Auto Differential    Collection Time: 07/23/19  4:17 AM   Result Value Ref Range    WBC 6.4 4.0 - 10.5 K/CU MM    RBC 3.01 (L) 4.6 - 6.2 M/CU MM    Hemoglobin (L) 13.5 - 18.0 GM/DL     9.5  HGB DECREASE CALLED TO GALI FITZPATRICK RN ON 7/23/19 AT 0459 BY DARRICK JARVIS [facility]  RESULTS READ BACK      Hematocrit 29.0 (L) 42 - 52 %    MCV 96.3 78 - 100 FL    MCH 31.6 (H) 27 - 31 PG    MCHC 32.8 32.0 - 36.0 %    RDW 14.8 11.7 - 14.9 %    Platelets 504 051 - 659 K/CU MM    MPV 9.6 7.5 - 11.1 FL    Differential Type AUTOMATED DIFFERENTIAL     Segs Relative 86.4 (H) 36 - 66 %    Lymphocytes % 7.0 (L) 24 - 44 %    Monocytes % 5.3 (H) 0 - 4 %    Eosinophils % 0.3 0 - 3 %    Basophils % 0.2 0 - 1 %    Segs Absolute 5.6 K/CU MM    Lymphocytes # 0.5 K/CU MM    Monocytes # 0.3 K/CU MM    Eosinophils # 0.0 K/CU MM    Basophils # 0.0 K/CU MM    Nucleated RBC % 0.3 %    Total Nucleated RBC 0.0 K/CU MM    Total Immature Neutrophil 0.05 K/CU MM    Immature Neutrophil % 0.8 (H) 0 - 0.43 %         Medical problems    Patient Active Problem List:     Bladder mass     Acute metabolic encephalopathy      ASSESSMENT:  ---------------------    Syncopy- ? Seizure     Left Parietal Vasogenic edema-Mri to reassess-pt being followed by [facility] neurosurgeon     Hx brain mets s/p XRT-Gamma knife surgery     Hx Bladder CA     PLAN:     CT brain as above     Mri brain with Contrast no edema-old smaller left parietal tumor s/p gamma knife encephalomalacia changes     C doppler neg     Consult Hem Onc     Continue  keppra     decrease decadron     Orthostatic Bp Hr neg     pts wife wants him to be followed by  [facility] and the neurologist sen by him before and I will not be following him as out patient.         Electronically signed by Gayle Prather MD on 7/23/2019 at 8:53 PM

## 2019-07-24 NOTE — CONSULTS
TUNNELED VENOUS PORT PLACEMENT Right 06/26/2018    DR Any Villanueva, T#[id number]    VASECTOMY  1982    WISDOM TOOTH EXTRACTION      4 Oto Teeth Extracted In Past       Current Medications:    Current Facility-Administered Medications: dexamethasone (DECADRON) tablet 2 mg, 2 mg, Oral, Daily  dronabinol (MARINOL) capsule 5 mg, 5 mg, Oral, BID WC  gabapentin (NEURONTIN) capsule 300 mg, 300 mg, Oral, TID  lactobacillus (CULTURELLE) capsule 1 capsule, 1 capsule, Oral, QAM  losartan (COZAAR) tablet 50 mg, 50 mg, Oral, Nightly  mirtazapine (REMERON SOL-TAB) disintegrating tablet 15 mg, 15 mg, Oral, Nightly  enoxaparin (LOVENOX) injection 90 mg, 1 mg/kg, Subcutaneous, BID  metoprolol succinate (TOPROL XL) extended release tablet 25 mg, 25 mg, Oral, Daily  acetaminophen (TYLENOL) tablet 650 mg, 650 mg, Oral, Q4H PRN  levETIRAcetam (KEPPRA) tablet 750 mg, 750 mg, Oral, BID    Allergies:  Niacin and related and Furosemide    Social History:    TOBACCO:   reports that he quit smoking about 44 years ago. His smoking use included cigarettes. He started smoking about 55 years ago. He has a 22.00 pack-year smoking history. He has never used smokeless tobacco.  ETOH:   reports that he drinks alcohol. DRUGS:   reports that he does not use drugs. Family History:       Problem Relation Age of Onset    Alzheimer's Disease Mother     Other Brother         Acid Reflux     REVIEW OF SYSTEMS:    No NVD. No fever or chills. No headache or dizziness. Weak. The remainder of ROS is unremarkable. PHYSICAL EXAM:      Vitals:    /73   Pulse 61   Temp 98.5 °F (36.9 °C) (Oral)   Resp 16   Ht 5' 11\" (1.803 m)   Wt 194 lb (88 kg)   SpO2 99%   BMI 27.06 kg/m²     CONSTITUTIONAL:  awake, alert, cooperative and no apparent distress  EYES:  extra-ocular muscles intact and sclera clear  NECK:  supple, symmetrical, trachea midline, no stridor and no lymphadenopathy  LUNGS:  CTA bl.   CARDIOVASCULAR:  normal S1 and S2 and no murmur

## 2019-07-25 LAB
CULTURE: NORMAL
Lab: NORMAL
SPECIMEN: NORMAL

## 2019-07-25 PROCEDURE — 6360000002 HC RX W HCPCS: Performed by: PSYCHIATRY & NEUROLOGY

## 2019-07-25 PROCEDURE — 97535 SELF CARE MNGMENT TRAINING: CPT

## 2019-07-25 PROCEDURE — 6370000000 HC RX 637 (ALT 250 FOR IP): Performed by: PSYCHIATRY & NEUROLOGY

## 2019-07-25 PROCEDURE — 97530 THERAPEUTIC ACTIVITIES: CPT

## 2019-07-25 PROCEDURE — 97116 GAIT TRAINING THERAPY: CPT

## 2019-07-25 PROCEDURE — 6370000000 HC RX 637 (ALT 250 FOR IP): Performed by: INTERNAL MEDICINE

## 2019-07-25 PROCEDURE — G0378 HOSPITAL OBSERVATION PER HR: HCPCS

## 2019-07-25 PROCEDURE — 6360000002 HC RX W HCPCS: Performed by: INTERNAL MEDICINE

## 2019-07-25 PROCEDURE — 96372 THER/PROPH/DIAG INJ SC/IM: CPT

## 2019-07-25 RX ADMIN — Medication 1 CAPSULE: at 10:01

## 2019-07-25 RX ADMIN — GABAPENTIN 300 MG: 300 CAPSULE ORAL at 09:57

## 2019-07-25 RX ADMIN — GABAPENTIN 300 MG: 300 CAPSULE ORAL at 22:22

## 2019-07-25 RX ADMIN — GABAPENTIN 300 MG: 300 CAPSULE ORAL at 15:10

## 2019-07-25 RX ADMIN — ENOXAPARIN SODIUM 90 MG: 100 INJECTION SUBCUTANEOUS at 09:58

## 2019-07-25 RX ADMIN — DEXAMETHASONE 2 MG: 4 TABLET ORAL at 09:59

## 2019-07-25 RX ADMIN — ACETAMINOPHEN 650 MG: 325 TABLET ORAL at 22:23

## 2019-07-25 RX ADMIN — LOSARTAN POTASSIUM 50 MG: 50 TABLET, FILM COATED ORAL at 22:24

## 2019-07-25 RX ADMIN — METOPROLOL SUCCINATE 25 MG: 25 TABLET, EXTENDED RELEASE ORAL at 10:01

## 2019-07-25 RX ADMIN — DRONABINOL 5 MG: 2.5 CAPSULE ORAL at 18:31

## 2019-07-25 RX ADMIN — ENOXAPARIN SODIUM 90 MG: 100 INJECTION SUBCUTANEOUS at 22:23

## 2019-07-25 RX ADMIN — LEVETIRACETAM 750 MG: 500 TABLET, FILM COATED ORAL at 09:57

## 2019-07-25 RX ADMIN — LEVETIRACETAM 750 MG: 500 TABLET, FILM COATED ORAL at 22:22

## 2019-07-25 RX ADMIN — MIRTAZAPINE 15 MG: 15 TABLET, ORALLY DISINTEGRATING ORAL at 22:22

## 2019-07-25 RX ADMIN — DRONABINOL 5 MG: 2.5 CAPSULE ORAL at 09:57

## 2019-07-25 ASSESSMENT — PAIN DESCRIPTION - LOCATION: LOCATION: HEAD

## 2019-07-25 ASSESSMENT — PAIN DESCRIPTION - PROGRESSION: CLINICAL_PROGRESSION: GRADUALLY WORSENING

## 2019-07-25 ASSESSMENT — PAIN DESCRIPTION - ONSET: ONSET: GRADUAL

## 2019-07-25 ASSESSMENT — PAIN DESCRIPTION - DESCRIPTORS: DESCRIPTORS: HEADACHE

## 2019-07-25 ASSESSMENT — PAIN DESCRIPTION - FREQUENCY: FREQUENCY: INTERMITTENT

## 2019-07-25 ASSESSMENT — PAIN - FUNCTIONAL ASSESSMENT: PAIN_FUNCTIONAL_ASSESSMENT: PREVENTS OR INTERFERES SOME ACTIVE ACTIVITIES AND ADLS

## 2019-07-25 ASSESSMENT — PAIN SCALES - GENERAL: PAINLEVEL_OUTOF10: 3

## 2019-07-25 NOTE — PROGRESS NOTES
Physical Therapy Treatment Note  Name: Laura Castellon MRN: [id number] :   1950   Date:  2019   Admission Date: 2019 Room:  [address] [place] [address]-A   Restrictions/Precautions:        fall risk  Communication with other providers:  OT  Subjective:  Patient states:  Agreeable to PT tx   Pain:   Location, Type, Intensity (0/10 to 10/10): Significant R LE pain with WB  Objective:    Observation:  Right LE edema (known DVT)  Treatment, including education/measures:  Therapeutic Activity Training:   Therapeutic activity training was instructed today. Cues were given for safety, sequence, UE/LE placement, awareness, and balance. Activities performed today included bed mobility training, sup-sit, sit-stand, SPT. Bed mob CGA-- very effortful, has difficulty overcoming sidelying to sit position, UE and abdominal weakness. Able to scoot to EOB with cues and multiple attempts     STS training--min A, frequent feedback for hand placement, appropriate weight transfer, good immediate standing balance    Standing balance training--stands for max of 30 seconds, limited by right LE pain, increased WB through device and weight shift to left     Gait Training:  Cues were given for safety, sequence, device management, balance, posture, awareness, path. 2 x 8 ft with RW-- min A, pt with excess UE WB, limited WB tolerance right, short steps and increased DL support. Pt attempts to sit prematurely without orienting self to destination   Assessment / Impression:       Pt is participating well, now ambulating short distances, stands for longer duration. Limited mostly by right LE pain.  Continue to rec ARU at dc    Patient's tolerance of treatment:  good   Adverse Reaction: none  Significant change in status and impact:  none  Barriers to improvement: brain metastasis  Plan for Next Session:    Per POC  Time in:  1140  Time out:  1210  Timed treatment minutes:  30  Total treatment time:  30    Previously filed

## 2019-07-25 NOTE — PROGRESS NOTES
7/25/2019 1037  Last data filed at 7/25/2019 7888  Gross per 24 hour   Intake --   Output 675 ml   Net -675 ml      Vitals:   Vitals:    07/25/19 1001   BP: 105/63   Pulse:    Resp:    Temp:    SpO2:      Physical Exam:   Gen:  awake, alert, cooperative, no apparent distress  Head/Eyes:  Normocephalic atraumatic, EOMI   NECK:   symmetrical, trachea midline  LUNGS: Normal Effort   CARDIOVASCULAR:  Normal rate  ABDOMEN: Non tender, non distended, no HSM noted. MUSCULOSKELETAL:  ROM limited  NEUROLOGIC: Alert and Oriented,  Cranial nerves II-XII are grossly intact.    SKIN:  no bruising or bleeding, normal skin color,  no redness      Data:       CBC   Recent Labs     07/23/19  0417   WBC 6.4   HGB 9.5  HGB DECREASE CALLED TO GALI FITZPATRICK RN ON 7/23/19 AT 0459 BY DARRICK SHETH  RESULTS READ BACK  *   HCT 29.0*         BMP Recent Labs     07/23/19  0417      K 4.0      CO2 25   BUN 15   CREATININE 0.8*         Electronically signed by Zev Rice MD on 7/25/2019 at 10:37 AM

## 2019-07-26 PROCEDURE — 97530 THERAPEUTIC ACTIVITIES: CPT

## 2019-07-26 PROCEDURE — 94761 N-INVAS EAR/PLS OXIMETRY MLT: CPT

## 2019-07-26 PROCEDURE — 97116 GAIT TRAINING THERAPY: CPT

## 2019-07-26 PROCEDURE — G0378 HOSPITAL OBSERVATION PER HR: HCPCS

## 2019-07-26 PROCEDURE — 6370000000 HC RX 637 (ALT 250 FOR IP): Performed by: INTERNAL MEDICINE

## 2019-07-26 PROCEDURE — 6370000000 HC RX 637 (ALT 250 FOR IP): Performed by: PSYCHIATRY & NEUROLOGY

## 2019-07-26 PROCEDURE — 6360000002 HC RX W HCPCS: Performed by: INTERNAL MEDICINE

## 2019-07-26 PROCEDURE — 6360000002 HC RX W HCPCS: Performed by: PSYCHIATRY & NEUROLOGY

## 2019-07-26 PROCEDURE — 96372 THER/PROPH/DIAG INJ SC/IM: CPT

## 2019-07-26 PROCEDURE — 97535 SELF CARE MNGMENT TRAINING: CPT

## 2019-07-26 RX ADMIN — GABAPENTIN 300 MG: 300 CAPSULE ORAL at 09:24

## 2019-07-26 RX ADMIN — ENOXAPARIN SODIUM 90 MG: 100 INJECTION SUBCUTANEOUS at 09:28

## 2019-07-26 RX ADMIN — ENOXAPARIN SODIUM 90 MG: 100 INJECTION SUBCUTANEOUS at 22:28

## 2019-07-26 RX ADMIN — LEVETIRACETAM 750 MG: 500 TABLET, FILM COATED ORAL at 22:27

## 2019-07-26 RX ADMIN — Medication 1 CAPSULE: at 09:24

## 2019-07-26 RX ADMIN — DRONABINOL 5 MG: 2.5 CAPSULE ORAL at 17:09

## 2019-07-26 RX ADMIN — DEXAMETHASONE 2 MG: 4 TABLET ORAL at 09:24

## 2019-07-26 RX ADMIN — DRONABINOL 5 MG: 2.5 CAPSULE ORAL at 09:24

## 2019-07-26 RX ADMIN — GABAPENTIN 300 MG: 300 CAPSULE ORAL at 14:12

## 2019-07-26 RX ADMIN — GABAPENTIN 300 MG: 300 CAPSULE ORAL at 22:27

## 2019-07-26 RX ADMIN — METOPROLOL SUCCINATE 25 MG: 25 TABLET, EXTENDED RELEASE ORAL at 09:24

## 2019-07-26 RX ADMIN — LOSARTAN POTASSIUM 50 MG: 50 TABLET, FILM COATED ORAL at 22:27

## 2019-07-26 RX ADMIN — MIRTAZAPINE 15 MG: 15 TABLET, ORALLY DISINTEGRATING ORAL at 22:33

## 2019-07-26 RX ADMIN — LEVETIRACETAM 750 MG: 500 TABLET, FILM COATED ORAL at 09:24

## 2019-07-26 NOTE — PROGRESS NOTES
Nutrition Assessment    Type and Reason for Visit: Reassess    Nutrition Recommendations:   Modify current diet to General per pt/family request  D/C oral nutrition supplement    Nutrition Assessment: Poor intake dos 2/2 sodium restriction. Consuming less than half of meals. Dislikes oral supplements. Malnutrition Assessment:  · Malnutrition Status: At risk for malnutrition    Nutrition Risk Level:  Moderate    Nutrient Needs:  · Estimated Daily Total Kcal: 5493-8245 (28-32 kcal/kg)  · Estimated Daily Protein (g): 108-126 (1.2-1.4 g/kg)  · Estimated Daily Total Fluid (ml/day): 0414-4473 (1 ml/kcal)    Nutrition Diagnosis:   · Problem: Inadequate oral intake  · Etiology: related to Catabolic illness     Signs and symptoms:  as evidenced by Diet history of poor intake, Weight loss    Objective Information:  · Wound Type: None  · Current Nutrition Therapies:  · Oral Diet Orders: Cardiac, 2gm Sodium   · Oral Diet intake: 1-25%  · Oral Nutrition Supplement (ONS) Orders: Low Calorie High Protein Supplement  · ONS intake: Refused, 0%  · Anthropometric Measures:  · Ht: 5' 11\" (180.3 cm)   · Current Body Wt: 194 lb 11.2 oz (88.3 kg)  · Admission Body Wt: 198 lb 9.6 oz (90.1 kg)  · Usual Body Wt: 204 lb 12.8 oz (92.9 kg)(6/19/19)  · % Weight Change:   -3% in 1 month, is not clinically significant for malnutrition  · Ideal Body Wt: 172 lb (78 kg), % Ideal Body 113  · BMI Classification: BMI 25.0 - 29.9 Overweight(27.2)    Nutrition Interventions:   Modify current diet, Discontinue ONS  Continued Inpatient Monitoring, Education Initiated, Coordination of Care    Nutrition Evaluation:   · Evaluation: No progress toward goals   · Goals:  Patient will meet at least 75% of estimated nutrient needs during los with meals and supplements provided      · Monitoring: Meal Intake, Supplement Intake, Diet Tolerance, Mental Status/Confusion, Pertinent Labs, Weight      Electronically signed by [facility] Staff, RD, LD on 7/26/19 at

## 2019-07-26 NOTE — PROGRESS NOTES
Hospitalist Progress Note      Name:  Michelle Brennan /Age/Sex: 1950  (77 y.o. male)   MRN & CSN:  [id number] & [id number] Admission Date/Time: 2019  9:36 PM   Location:  [address] [place] [address] PCP: Dotty Ritter MD       Michelle Brennan is a 77 y.o.  male  who presents with Altered Mental Status      Assessment and Plan:   Acute Metabolic Encephalopathy  - clinically resolved  - CT head: no acute process  - UA shows some WBC's will check cx NTD  - check ammonia: WNL   - Neurology consulted on admission  - MRI head: neg, decreased size of left parietal brain mass  - carotid duplex; 0-50% stenosis b/l     RLE DVT  - on full dose lovenox, can change to NOAC on d/c  - no signs or symptoms of PE     Generalized weakness/deconditioning:  - Physical therapy consultation.  - Patient has home PT/OT. Dizziness  -stable  - likely orthostatic. 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   Neuropathy  Insomnia     Pt/ot for rehab ARU declined, CM to talk to pt about other rehab options, medically stable            Diet DIET CARDIAC; Low Sodium (2 GM)  Dietary Nutrition Supplements: Low Calorie High Protein Supplement   Code Status Prior     Medications:   Medications:    dexamethasone  2 mg Oral Daily    dronabinol  5 mg Oral BID WC    gabapentin  300 mg Oral TID    lactobacillus  1 capsule Oral QAM    losartan  50 mg Oral Nightly    mirtazapine  15 mg Oral Nightly    enoxaparin  1 mg/kg Subcutaneous BID    metoprolol succinate  25 mg Oral Daily    levETIRAcetam  750 mg Oral BID      Infusions:   PRN Meds:   acetaminophen 650 mg Q4H PRN     Subjective:     No complaints  Objective: Intake/Output Summary (Last 24 hours) at 7/26/2019 1019  Last data filed at 7/26/2019 0356  Gross per 24 hour   Intake --   Output 500 ml   Net -500 ml      Vitals:   Vitals:    07/26/19 0925   BP: 127/68   Pulse:    Resp:    Temp: 98.4 °F (36.9 °C)   SpO2: 98%     Physical Exam:   Gen:  awake, alert, cooperative, no apparent distress  Head/Eyes:  Normocephalic atraumatic, EOMI   NECK:   symmetrical, trachea midline  LUNGS: Normal Effort   CARDIOVASCULAR:  Normal rate  ABDOMEN: Non tender, non distended, no HSM noted. MUSCULOSKELETAL:  ROM WNL  NEUROLOGIC: Alert and Oriented,  Cranial nerves II-XII are grossly intact. SKIN:  no bruising or bleeding, normal skin color,  no redness      Data:       CBC No results for input(s): WBC, HGB, HCT, PLT in the last 72 hours. BMP No results for input(s): NA, K, CL, CO2, PHOS, BUN, CREATININE in the last 72 hours.     Invalid input(s): CA      Electronically signed by Marlene Washington MD on 7/26/2019 at 10:19 AM

## 2019-07-26 NOTE — PLAN OF CARE
Nutrition Problem: Inadequate oral intake  Intervention: Food and/or Nutrient Delivery: Modify current diet, Discontinue ONS  Nutritional Goals:  Patient will meet at least 75% of estimated nutrient needs during los with meals and supplements provided

## 2019-07-26 NOTE — CARE COORDINATION
LSW received a call from Gregory Trejo with [facility] and they have started the precert. Will need updated PT/OT. LSW placed a note on the computer white board asking for therapy to update notes this weekend. Precert pending for [facility].

## 2019-07-26 NOTE — PLAN OF CARE
Problem: Falls - Risk of:  Goal: Will remain free from falls  Description  Will remain free from falls  Outcome: Ongoing  Goal: Absence of physical injury  Description  Absence of physical injury  Outcome: Ongoing     Problem: Discharge Planning:  Goal: Participates in care planning  Description  Participates in care planning  Outcome: Ongoing  Goal: Discharged to appropriate level of care  Description  Discharged to appropriate level of care  Outcome: Ongoing     Problem:  Activity Intolerance:  Goal: Ability to tolerate increased activity will improve  Description  Ability to tolerate increased activity will improve  Outcome: Ongoing     Problem: Mental Status - Impaired:  Goal: Absence of continued neurological deterioration signs and symptoms  Description  Absence of continued neurological deterioration signs and symptoms  Outcome: Ongoing  Goal: Mental status will be restored to baseline  Description  Mental status will be restored to baseline  Outcome: Ongoing     Problem: Mobility - Impaired:  Goal: Mobility will improve to maximum level  Description  Mobility will improve to maximum level  Outcome: Ongoing     Problem: Nutrition  Goal: Optimal nutrition therapy  Outcome: Ongoing     Problem: Venous Thromboembolism:  Goal: Will show no signs or symptoms of venous thromboembolism  Description  Will show no signs or symptoms of venous thromboembolism  Outcome: Ongoing  Goal: Absence of signs or symptoms of impaired coagulation  Description  Absence of signs or symptoms of impaired coagulation  Outcome: Ongoing     Problem: Pain:  Goal: Pain level will decrease  Description  Pain level will decrease  Outcome: Ongoing  Goal: Control of acute pain  Description  Control of acute pain  Outcome: Ongoing  Goal: Control of chronic pain  Description  Control of chronic pain  Outcome: Ongoing

## 2019-07-26 NOTE — PROGRESS NOTES
glands are without focal abnormality. No renal or ureteral calculus. No hydronephrosis or perinephric stranding. The kidneys are lobular in contour. GI/Bowel: No dilated loops of bowel or bowel wall thickening. Colonic diverticulosis without acute diverticulitis. No extraluminal contrast or free air. The appendix is normal. Pelvis: Postsurgical changes from cysto prostatectomy with an ileal conduit in the right lower quadrant. Calcifications are noted along the posterior aspect of the conduit which may reflect stones or suture material.  No pelvic adenopathy or free fluid. Peritoneum/Retroperitoneum: The aorta is normal caliber with mild atherosclerosis. No retroperitoneal or mesenteric adenopathy. No ascites or drainable fluid collection. Bones/Soft Tissues: No acute osseous abnormality. Foci of soft tissue gas are noted within the anterior subcutaneous soft tissues and may be related to injection sites or recent procedure. Postsurgical changes from cysto prostatectomy. Calcification along the posterior aspect favoring suture material over stones. Stable 3 mm left upper lobe pulmonary nodule which can be followed on subsequent exams. Otherwise no findings of metastatic disease in the chest, abdomen or pelvis. Foci of soft tissue gas are noted in the anterior subcutaneous soft tissues either related to recent injection sites and/or procedure. Xr Chest Portable    Result Date: 7/21/2019  EXAMINATION: ONE XRAY VIEW OF THE CHEST 7/21/2019 10:12 pm COMPARISON: None. HISTORY: ORDERING SYSTEM PROVIDED HISTORY: unresponsiveness TECHNOLOGIST PROVIDED HISTORY: Reason for exam:->unresponsiveness Reason for Exam: unresponsiveness Acuity: Acute Type of Exam: Initial Mechanism of Injury: unresponsiveness Relevant Medical/Surgical History: unresponsiveness FINDINGS: The lungs are mildly underinflated. A right internal jugular port catheter tip terminates in the superior vena cava.   No focal consolidation or pleural effusion. No semi-upright evidence of pneumothorax. The cardiac silhouette and mediastinal contours are within normal limits. No acute bony abnormality. No acute process. Vl Dup Carotid Bilateral    Result Date: 7/22/2019  EXAMINATION: ULTRASOUND EVALUATION OF THE CAROTID ARTERIES 7/22/2019 COMPARISON: None. HISTORY: ORDERING SYSTEM PROVIDED HISTORY: r/o carotid stenosis TECHNOLOGIST PROVIDED HISTORY: Reason for exam:->r/o carotid stenosis Reason for Exam: TIA Type of Exam: Initial FINDINGS: RIGHT: The right common carotid artery demonstrates peak systolic velocities of 83, 73 cm/sec in the proximal and distal segments respectively. The right internal carotid artery demonstrates the systolic velocities of 917, 79, 89 cm/sec in the proximal, mid and distal segments respectively. The external carotid artery is patent. The vertebral artery demonstrates normal antegrade flow. Plaque seen in the proximal internal carotid artery ICA/CCA ratio of 1.8. LEFT: The left common carotid artery demonstrates peak systolic velocities of 82, 79 cm/sec in the proximal and distal segments respectively. The left internal carotid artery demonstrates the systolic velocities of 84, 76, 87 cm/sec in the proximal, mid and distal segments respectively. The external carotid artery is patent. The vertebral artery demonstrates normal antegrade flow. Plaque seen in the proximal internal and external carotid arteries ICA/CCA ratio of 1.1. The right internal carotid artery demonstrates 0-50% stenosis . The left internal carotid artery demonstrates 0-50% stenosis . Bilateral vertebral arteries are patent with flow in the normal direction.      Vl Extremity Venous Right    Result Date: 7/2/2019  Lower Extremities DVT Study  Demographics   Patient Name       Gina Jon   Date of Study      07/02/2019         Gender              Male   Patient Number     [id number]         Date of Birth       1950   Visit Number ------------------------------------------------------------------  Electronically signed by Saad Russell MD (Interpreting  physician) on 07/02/2019 at 03:04 PM  ------------------------------------------------------------------  Patient Status:Routine. Study [address] [place] [address] - Vascular Lab. Technical Quality:Good visualization.   - Results were reported to:Analilia at Dr. Sherrie Harmon office [facility])     at 8:30 am on 7/2/19. Risk Factors History +---------+----+------------------------------------------------+ ! Diagnosis! Date! Comments                                        ! +---------+----+------------------------------------------------+ ! Other    ! !H/O bladder and brain cancer, OA, diverticulitis! +---------+----+------------------------------------------------+   - The patient's risk factor(s) include: arterial hypertension.   - The patient has a former tobacco history of 22 years . - The patient has cancer. Velocities are measured in cm/s ; Diameters are measured in mm Right Lower Extremities DVT Study Measurements Right 2D Measurements +------------------------+----------+---------------+----------+ ! Location                ! Visualized! Compressibility! Thrombosis! +------------------------+----------+---------------+----------+ ! Sapheno Femoral Junction! Yes       ! No             !Acute     ! +------------------------+----------+---------------+----------+ ! GSV Thigh               ! Yes       ! No             !Acute     ! +------------------------+----------+---------------+----------+ ! Common Femoral          !Yes       ! No             !Acute     ! +------------------------+----------+---------------+----------+ ! Femoral                 !Yes       ! No             !Acute     ! +------------------------+----------+---------------+----------+ ! Prox Femoral            !Yes       ! No             !Acute     ! +------------------------+----------+---------------+----------+ ! Mid Femoral

## 2019-07-26 NOTE — CARE COORDINATION
Received VM from [address] [place] [address] stating patient has been denied for ARU. Notified Mala Barr.

## 2019-07-26 NOTE — CARE COORDINATION
Pt has been denied by insurance to go to ARU. LSW spoke with pt and informed him of the denial and talked about a Plan B for a SNF. Pt requested LSW to call his SO Tracie. LSW spoke with Arin Spain and she and pt requested [facility]. LSW called [facility] and gave referral to [facility]. She will look over pt info and get back with this LSW.

## 2019-07-26 NOTE — PROGRESS NOTES
tip terminates in the superior vena cava. No focal consolidation or pleural effusion. No semi-upright evidence of pneumothorax. The cardiac silhouette and mediastinal contours are within normal limits. No acute bony abnormality. No acute process. Vl Dup Carotid Bilateral    Result Date: 7/22/2019  EXAMINATION: ULTRASOUND EVALUATION OF THE CAROTID ARTERIES 7/22/2019 COMPARISON: None. HISTORY: ORDERING SYSTEM PROVIDED HISTORY: r/o carotid stenosis TECHNOLOGIST PROVIDED HISTORY: Reason for exam:->r/o carotid stenosis Reason for Exam: TIA Type of Exam: Initial FINDINGS: RIGHT: The right common carotid artery demonstrates peak systolic velocities of 83, 73 cm/sec in the proximal and distal segments respectively. The right internal carotid artery demonstrates the systolic velocities of 166, 79, 89 cm/sec in the proximal, mid and distal segments respectively. The external carotid artery is patent. The vertebral artery demonstrates normal antegrade flow. Plaque seen in the proximal internal carotid artery ICA/CCA ratio of 1.8. LEFT: The left common carotid artery demonstrates peak systolic velocities of 82, 79 cm/sec in the proximal and distal segments respectively. The left internal carotid artery demonstrates the systolic velocities of 84, 76, 87 cm/sec in the proximal, mid and distal segments respectively. The external carotid artery is patent. The vertebral artery demonstrates normal antegrade flow. Plaque seen in the proximal internal and external carotid arteries ICA/CCA ratio of 1.1. The right internal carotid artery demonstrates 0-50% stenosis . The left internal carotid artery demonstrates 0-50% stenosis . Bilateral vertebral arteries are patent with flow in the normal direction.      Vl Extremity Venous Right    Result Date: 7/2/2019  Lower Extremities DVT Study  Demographics   Patient Name       Vickie Goodman   Date of Study      07/02/2019         Gender              Male   Patient Number

## 2019-07-27 LAB
CULTURE: NORMAL
CULTURE: NORMAL
Lab: NORMAL
Lab: NORMAL
SPECIMEN: NORMAL
SPECIMEN: NORMAL

## 2019-07-27 PROCEDURE — 6370000000 HC RX 637 (ALT 250 FOR IP): Performed by: PSYCHIATRY & NEUROLOGY

## 2019-07-27 PROCEDURE — 97530 THERAPEUTIC ACTIVITIES: CPT

## 2019-07-27 PROCEDURE — 96372 THER/PROPH/DIAG INJ SC/IM: CPT

## 2019-07-27 PROCEDURE — 6370000000 HC RX 637 (ALT 250 FOR IP): Performed by: INTERNAL MEDICINE

## 2019-07-27 PROCEDURE — G0378 HOSPITAL OBSERVATION PER HR: HCPCS

## 2019-07-27 PROCEDURE — 94761 N-INVAS EAR/PLS OXIMETRY MLT: CPT

## 2019-07-27 PROCEDURE — 6360000002 HC RX W HCPCS: Performed by: PSYCHIATRY & NEUROLOGY

## 2019-07-27 PROCEDURE — 6370000000 HC RX 637 (ALT 250 FOR IP): Performed by: FAMILY MEDICINE

## 2019-07-27 PROCEDURE — 6360000002 HC RX W HCPCS: Performed by: INTERNAL MEDICINE

## 2019-07-27 RX ADMIN — METOPROLOL SUCCINATE 25 MG: 25 TABLET, EXTENDED RELEASE ORAL at 08:49

## 2019-07-27 RX ADMIN — ENOXAPARIN SODIUM 90 MG: 100 INJECTION SUBCUTANEOUS at 08:50

## 2019-07-27 RX ADMIN — DEXAMETHASONE 2 MG: 4 TABLET ORAL at 08:50

## 2019-07-27 RX ADMIN — LOSARTAN POTASSIUM 50 MG: 50 TABLET, FILM COATED ORAL at 20:28

## 2019-07-27 RX ADMIN — LEVETIRACETAM 750 MG: 500 TABLET, FILM COATED ORAL at 08:49

## 2019-07-27 RX ADMIN — GABAPENTIN 300 MG: 300 CAPSULE ORAL at 20:27

## 2019-07-27 RX ADMIN — DRONABINOL 5 MG: 2.5 CAPSULE ORAL at 17:47

## 2019-07-27 RX ADMIN — GABAPENTIN 300 MG: 300 CAPSULE ORAL at 14:29

## 2019-07-27 RX ADMIN — LEVETIRACETAM 750 MG: 500 TABLET, FILM COATED ORAL at 20:28

## 2019-07-27 RX ADMIN — ACETAMINOPHEN 650 MG: 325 TABLET ORAL at 06:00

## 2019-07-27 RX ADMIN — DRONABINOL 5 MG: 2.5 CAPSULE ORAL at 08:49

## 2019-07-27 RX ADMIN — RIVAROXABAN 15 MG: 15 TABLET, FILM COATED ORAL at 17:47

## 2019-07-27 RX ADMIN — Medication 1 CAPSULE: at 08:49

## 2019-07-27 RX ADMIN — MIRTAZAPINE 15 MG: 15 TABLET, ORALLY DISINTEGRATING ORAL at 20:28

## 2019-07-27 RX ADMIN — GABAPENTIN 300 MG: 300 CAPSULE ORAL at 08:49

## 2019-07-27 ASSESSMENT — PAIN SCALES - GENERAL
PAINLEVEL_OUTOF10: 0
PAINLEVEL_OUTOF10: 2

## 2019-07-27 NOTE — PROGRESS NOTES
superficial venous  thrombophlebitis involving the right lower extremity, as described above. Signature   ------------------------------------------------------------------  Electronically signed by Jm Carr MD (Interpreting  physician) on 07/02/2019 at 03:04 PM  ------------------------------------------------------------------  Patient Status:Routine. Study [address] [place] [address] - Vascular Lab. Technical Quality:Good visualization.   - Results were reported to:Analilia at Dr. Vazquez [facility] office [facility])     at 8:30 am on 7/2/19. Risk Factors History +---------+----+------------------------------------------------+ ! Diagnosis! Date! Comments                                        ! +---------+----+------------------------------------------------+ ! Other    ! !H/O bladder and brain cancer, OA, diverticulitis! +---------+----+------------------------------------------------+   - The patient's risk factor(s) include: arterial hypertension.   - The patient has a former tobacco history of 22 years . - The patient has cancer. Velocities are measured in cm/s ; Diameters are measured in mm Right Lower Extremities DVT Study Measurements Right 2D Measurements +------------------------+----------+---------------+----------+ ! Location                ! Visualized! Compressibility! Thrombosis! +------------------------+----------+---------------+----------+ ! Sapheno Femoral Junction! Yes       ! No             !Acute     ! +------------------------+----------+---------------+----------+ ! GSV Thigh               ! Yes       ! No             !Acute     ! +------------------------+----------+---------------+----------+ ! Common Femoral          !Yes       ! No             !Acute     ! +------------------------+----------+---------------+----------+ ! Femoral                 !Yes       ! No             !Acute     ! +------------------------+----------+---------------+----------+ ! Prox Femoral            !Yes

## 2019-07-27 NOTE — PROGRESS NOTES
With: Significant other(and 2 dogs)  Type of Home: House  Home Layout: One level, Laundry in basement  Home Access: Stairs to enter with rails  Entrance Stairs - Number of Steps: 2  Entrance Stairs - Rails: Both  Bathroom Shower/Tub: Walk-in shower, Tub/Shower unit  Bathroom Toilet: Handicap height  Bathroom Equipment: Shower chair  Bathroom Accessibility: Accessible  Home Equipment: [address], 4 wheeled walker, Rolling walker  Receives Help From: Family  ADL Assistance: Needs assistance  Homemaking Assistance: Needs assistance  Ambulation Assistance: Needs assistance(s/o has been physically assisting since June)  Transfer Assistance: Needs assistance  Active : No  Occupation: Retired          Electronically signed by:    Diane Henderson, PT  7/27/2019, 1:40 PM

## 2019-07-28 PROCEDURE — 6370000000 HC RX 637 (ALT 250 FOR IP): Performed by: INTERNAL MEDICINE

## 2019-07-28 PROCEDURE — G0378 HOSPITAL OBSERVATION PER HR: HCPCS

## 2019-07-28 PROCEDURE — 6370000000 HC RX 637 (ALT 250 FOR IP): Performed by: PSYCHIATRY & NEUROLOGY

## 2019-07-28 PROCEDURE — 6370000000 HC RX 637 (ALT 250 FOR IP): Performed by: FAMILY MEDICINE

## 2019-07-28 PROCEDURE — 94761 N-INVAS EAR/PLS OXIMETRY MLT: CPT

## 2019-07-28 PROCEDURE — 6360000002 HC RX W HCPCS: Performed by: PSYCHIATRY & NEUROLOGY

## 2019-07-28 RX ADMIN — Medication 1 CAPSULE: at 09:08

## 2019-07-28 RX ADMIN — RIVAROXABAN 15 MG: 15 TABLET, FILM COATED ORAL at 09:09

## 2019-07-28 RX ADMIN — DRONABINOL 5 MG: 2.5 CAPSULE ORAL at 09:08

## 2019-07-28 RX ADMIN — RIVAROXABAN 15 MG: 15 TABLET, FILM COATED ORAL at 16:24

## 2019-07-28 RX ADMIN — DEXAMETHASONE 2 MG: 4 TABLET ORAL at 09:09

## 2019-07-28 RX ADMIN — GABAPENTIN 300 MG: 300 CAPSULE ORAL at 09:08

## 2019-07-28 RX ADMIN — LEVETIRACETAM 750 MG: 500 TABLET, FILM COATED ORAL at 20:58

## 2019-07-28 RX ADMIN — GABAPENTIN 300 MG: 300 CAPSULE ORAL at 13:13

## 2019-07-28 RX ADMIN — LEVETIRACETAM 750 MG: 500 TABLET, FILM COATED ORAL at 09:08

## 2019-07-28 RX ADMIN — MIRTAZAPINE 15 MG: 15 TABLET, ORALLY DISINTEGRATING ORAL at 20:58

## 2019-07-28 RX ADMIN — DRONABINOL 5 MG: 2.5 CAPSULE ORAL at 16:25

## 2019-07-28 RX ADMIN — LOSARTAN POTASSIUM 50 MG: 50 TABLET, FILM COATED ORAL at 20:58

## 2019-07-28 RX ADMIN — METOPROLOL SUCCINATE 25 MG: 25 TABLET, EXTENDED RELEASE ORAL at 09:09

## 2019-07-28 RX ADMIN — GABAPENTIN 300 MG: 300 CAPSULE ORAL at 20:58

## 2019-07-28 ASSESSMENT — PAIN SCALES - GENERAL: PAINLEVEL_OUTOF10: 0

## 2019-07-29 VITALS
BODY MASS INDEX: 27.03 KG/M2 | WEIGHT: 193.1 LBS | HEIGHT: 71 IN | HEART RATE: 67 BPM | SYSTOLIC BLOOD PRESSURE: 102 MMHG | OXYGEN SATURATION: 96 % | RESPIRATION RATE: 14 BRPM | DIASTOLIC BLOOD PRESSURE: 56 MMHG | TEMPERATURE: 98.5 F

## 2019-07-29 PROCEDURE — 6370000000 HC RX 637 (ALT 250 FOR IP): Performed by: PSYCHIATRY & NEUROLOGY

## 2019-07-29 PROCEDURE — 6370000000 HC RX 637 (ALT 250 FOR IP): Performed by: FAMILY MEDICINE

## 2019-07-29 PROCEDURE — 97110 THERAPEUTIC EXERCISES: CPT

## 2019-07-29 PROCEDURE — 6370000000 HC RX 637 (ALT 250 FOR IP): Performed by: INTERNAL MEDICINE

## 2019-07-29 PROCEDURE — 6360000002 HC RX W HCPCS: Performed by: INTERNAL MEDICINE

## 2019-07-29 PROCEDURE — 97530 THERAPEUTIC ACTIVITIES: CPT

## 2019-07-29 PROCEDURE — G0378 HOSPITAL OBSERVATION PER HR: HCPCS

## 2019-07-29 PROCEDURE — 97116 GAIT TRAINING THERAPY: CPT

## 2019-07-29 RX ORDER — LEVETIRACETAM 750 MG/1
750 TABLET ORAL 2 TIMES DAILY
Qty: 60 TABLET | Refills: 3 | Status: SHIPPED | OUTPATIENT
Start: 2019-07-29

## 2019-07-29 RX ORDER — LORAZEPAM 0.5 MG/1
0.5 TABLET ORAL NIGHTLY PRN
Qty: 10 TABLET | Refills: 0 | Status: SHIPPED | OUTPATIENT
Start: 2019-07-29 | End: 2019-08-08

## 2019-07-29 RX ORDER — HEPARIN SODIUM (PORCINE) LOCK FLUSH IV SOLN 100 UNIT/ML 100 UNIT/ML
500 SOLUTION INTRAVENOUS PRN
Status: DISCONTINUED | OUTPATIENT
Start: 2019-07-29 | End: 2019-07-29 | Stop reason: HOSPADM

## 2019-07-29 RX ORDER — HEPARIN SODIUM 5000 [USP'U]/ML
5000 INJECTION, SOLUTION INTRAVENOUS; SUBCUTANEOUS EVERY 8 HOURS SCHEDULED
Status: DISCONTINUED | OUTPATIENT
Start: 2019-07-29 | End: 2019-07-29

## 2019-07-29 RX ADMIN — DRONABINOL 5 MG: 2.5 CAPSULE ORAL at 09:12

## 2019-07-29 RX ADMIN — METOPROLOL SUCCINATE 25 MG: 25 TABLET, EXTENDED RELEASE ORAL at 09:13

## 2019-07-29 RX ADMIN — GABAPENTIN 300 MG: 300 CAPSULE ORAL at 14:15

## 2019-07-29 RX ADMIN — Medication 500 UNITS: at 17:52

## 2019-07-29 RX ADMIN — Medication 1 CAPSULE: at 09:14

## 2019-07-29 RX ADMIN — LEVETIRACETAM 750 MG: 500 TABLET, FILM COATED ORAL at 09:12

## 2019-07-29 RX ADMIN — GABAPENTIN 300 MG: 300 CAPSULE ORAL at 09:13

## 2019-07-29 RX ADMIN — RIVAROXABAN 15 MG: 15 TABLET, FILM COATED ORAL at 09:13

## 2019-07-29 ASSESSMENT — PAIN SCALES - GENERAL
PAINLEVEL_OUTOF10: 0
PAINLEVEL_OUTOF10: 0

## 2019-07-29 NOTE — DISCHARGE SUMMARY
WITHOUT CONTRAST 7/15/2019 9:58 am TECHNIQUE: CT of the abdomen and pelvis was performed without the administration of intravenous contrast. Multiplanar reformatted images are provided for review. Dose modulation, iterative reconstruction, and/or weight based adjustment of the mA/kV was utilized to reduce the radiation dose to as low as reasonably achievable.; CT of the chest was performed without the administration of intravenous contrast. Multiplanar reformatted images are provided for review. Dose modulation, iterative reconstruction, and/or weight based adjustment of the mA/kV was utilized to reduce the radiation dose to as low as reasonably achievable. 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 The heart size within normal limits. Mitral valvular calcifications. No pericardial effusion. Thoracic aorta is normal in caliber. The main pulmonary artery is normal caliber. Coronary arterial calcifications are noted. The esophagus is unremarkable. Lungs/pleura: Bibasilar scarring with associated calcifications. No focal consolidation, pleural effusion or pneumothorax. No pleural effusion or pneumothorax. No suspicious pulmonary nodules. Central airways are patent. Stable 3 mm nodule in the left upper lobe anteriorly.  Soft Tissues/Bones: Right-sided chest port in place with the tip terminating in the distal SVC. No acute osseous abnormality. Degenerative changes of thoracic spine. Abdomen/Pelvis: Organs: The unenhanced liver, spleen, pancreas, gallbladder and adrenal glands are without focal abnormality. No renal or ureteral calculus. No hydronephrosis or perinephric stranding. The kidneys are lobular in contour. GI/Bowel: No dilated loops of bowel or bowel wall thickening. Colonic diverticulosis without acute diverticulitis. No extraluminal contrast or free air. The appendix is normal. Pelvis: Postsurgical changes from cysto prostatectomy with an ileal conduit in the right lower quadrant. Calcifications are noted along the posterior aspect of the conduit which may reflect stones or suture material.  No pelvic adenopathy or free fluid. Peritoneum/Retroperitoneum: The aorta is normal caliber with mild atherosclerosis. No retroperitoneal or mesenteric adenopathy. No ascites or drainable fluid collection. Bones/Soft Tissues: No acute osseous abnormality. Foci of soft tissue gas are noted within the anterior subcutaneous soft tissues and may be related to injection sites or recent procedure. Postsurgical changes from cysto prostatectomy. Calcification along the posterior aspect favoring suture material over stones. Stable 3 mm left upper lobe pulmonary nodule which can be followed on subsequent exams. Otherwise no findings of metastatic disease in the chest, abdomen or pelvis. Foci of soft tissue gas are noted in the anterior subcutaneous soft tissues either related to recent injection sites and/or procedure. Ct Head Wo Contrast    Result Date: 7/22/2019  EXAMINATION: CT OF THE HEAD WITHOUT CONTRAST  7/21/2019 11:25 pm TECHNIQUE: CT of the head was performed without the administration of intravenous contrast. Dose modulation, iterative reconstruction, and/or weight based adjustment of the mA/kV was utilized to reduce the radiation dose to as low as reasonably achievable. COMPARISON: 05/29/2019 HISTORY: ORDERING SYSTEM PROVIDED HISTORY: CONFUSION/DELIRIUM, ALTERED LOC, UNEXPLAINED TECHNOLOGIST PROVIDED HISTORY: Has a \"code stroke\" or \"stroke alert\" been called? ->No Reason for Exam: ams Patient with known brain tumor status post gamma knife surgery in January FINDINGS: BRAIN/VENTRICLES: There is no acute intracranial hemorrhage, mass effect or midline shift. Evaluation for mass lesion is limited without intravenous contrast.  The previous exam showed a tumor in the high left parietal lobe which is not identified on today's noncontrast exam.  There is decreased vasogenic edema in this region compared to the prior study. No noncontrast evidence of new mass lesion. No abnormal extra-axial fluid collection. No evidence of recent territorial infarct. There are nonspecific areas of hypoattenuation in the periventricular white matter and centrum semiovale that are likely related to chronic small vessel ischemic disease. The ventricles and cisternal spaces are prominent consistent with cerebral atrophy. There is no evidence of hydrocephalus. There is intracranial atherosclerosis. ORBITS: The visualized portion of the orbits demonstrate no acute abnormality. SINUSES: The visualized paranasal sinuses and mastoid air cells demonstrate no acute abnormality. SOFT TISSUES/SKULL:  No acute abnormality of the visualized skull or soft tissues. 1. No acute intracranial abnormality. 2. Decreased vasogenic edema in the high left parietal region in the area of previously seen mass lesion. Evaluation for mass is limited without intravenous contrast.     Ct Head Wo Contrast    Result Date: 7/2/2019  CT HEAD WO CONTRAST Indication: Fall, head injury. Comparison: MRI brain 6/19/19 Technique:  CT head was performed without intravenous contrast. Coronal and sagittal reformations obtained. Up-to-date CT equipment and radiation dose reduction techniques were employed. Findings:  There is no evidence Additional signs and symptoms: vasectomy, 50 ml omnipaque plus water, hx bladder ca Relevant Medical/Surgical History: vasectomy, 50 ml omnipaque plus water, hx bladder ca FINDINGS: Chest: Mediastinum: No new pathologically enlarged thoracic adenopathy on this unenhanced study. The heart size within normal limits. Mitral valvular calcifications. No pericardial effusion. Thoracic aorta is normal in caliber. The main pulmonary artery is normal caliber. Coronary arterial calcifications are noted. The esophagus is unremarkable. Lungs/pleura: Bibasilar scarring with associated calcifications. No focal consolidation, pleural effusion or pneumothorax. No pleural effusion or pneumothorax. No suspicious pulmonary nodules. Central airways are patent. Stable 3 mm nodule in the left upper lobe anteriorly. Soft Tissues/Bones: Right-sided chest port in place with the tip terminating in the distal SVC. No acute osseous abnormality. Degenerative changes of thoracic spine. Abdomen/Pelvis: Organs: The unenhanced liver, spleen, pancreas, gallbladder and adrenal glands are without focal abnormality. No renal or ureteral calculus. No hydronephrosis or perinephric stranding. The kidneys are lobular in contour. GI/Bowel: No dilated loops of bowel or bowel wall thickening. Colonic diverticulosis without acute diverticulitis. No extraluminal contrast or free air. The appendix is normal. Pelvis: Postsurgical changes from cysto prostatectomy with an ileal conduit in the right lower quadrant. Calcifications are noted along the posterior aspect of the conduit which may reflect stones or suture material.  No pelvic adenopathy or free fluid. Peritoneum/Retroperitoneum: The aorta is normal caliber with mild atherosclerosis. No retroperitoneal or mesenteric adenopathy. No ascites or drainable fluid collection. Bones/Soft Tissues: No acute osseous abnormality.   Foci of soft tissue gas are noted within the anterior subcutaneous had multiple falls. He denies a prior H/O DVT. He is currently in treatment for brain cancer. Venous Duplex Scan: B-mode imaging of the deep and superficial veins, with compression maneuvers, including color and Doppler spectral waveform analysis. Impressions Right Impression There is extensive, acute, totally occluding deep venous thrombosis involving the common femoral, deep femoral, femoral, popliteal, gastrocnemius, posterior tibial, and peroneal veins. There is acute totally occluding superficial venous thrombosis involving the great saphenous vein, from the sapheno-femoral junction to the proximal calf, and also the small saphenous vein. There is no other evidence of deep or superficial venous thrombosis involving the right lower extremity. Left Impression There is no evidence of deep venous thrombosis involving the common femoral vein. There are no previous studies for comparison. Conclusions   Summary   There is evidence of extensive acute DVT and superficial venous  thrombophlebitis involving the right lower extremity, as described above. Signature   ------------------------------------------------------------------  Electronically signed by Beatriz Box MD (Interpreting  physician) on 07/02/2019 at 03:04 PM  ------------------------------------------------------------------  Patient Status:Routine. Study [address] [place] [address] - Vascular Lab. Technical Quality:Good visualization.   - Results were reported to:Analilia at Dr. Angela Rudolph office [facility])     at 8:30 am on 7/2/19. Risk Factors History +---------+----+------------------------------------------------+ ! Diagnosis! Date! Comments                                        ! +---------+----+------------------------------------------------+ ! Other    ! !H/O bladder and brain cancer, OA, diverticulitis! +---------+----+------------------------------------------------+   - The patient's risk factor(s) include: arterial hypertension. +------------------------+----------+---------------+----------+ ! PTV                     ! Yes       ! No             !Acute     ! +------------------------+----------+---------------+----------+ ! Peroneal                !Yes       ! No             !Acute     ! +------------------------+----------+---------------+----------+ ! GSV Calf                ! Yes       ! Yes            ! None      ! +------------------------+----------+---------------+----------+ ! SSV                     ! Yes       ! No             !Acute     ! +------------------------+----------+---------------+----------+ Right Doppler Measurements +------------------------+------+------+------------+ ! Location                ! Signal!Reflux! Reflux (sec)! +------------------------+------+------+------------+ ! Sapheno Femoral Junction! Absent! No    !            ! +------------------------+------+------+------------+ ! Common Femoral          !Absent! No    !            ! +------------------------+------+------+------------+ ! Femoral                 !Absent! No    !            ! +------------------------+------+------+------------+ ! Prox Femoral            !Absent! No    !            ! +------------------------+------+------+------------+ ! Deep Femoral            !Absent! No    !            ! +------------------------+------+------+------------+ ! Popliteal               !Absent! No    !            ! +------------------------+------+------+------------+ Left Lower Extremities DVT Study Measurements Left 2D Measurements +--------------+----------+---------------+----------+ ! Location      ! Visualized! Compressibility! Thrombosis! +--------------+----------+---------------+----------+ ! Common Femoral!Yes       ! Yes            ! None      ! +--------------+----------+---------------+----------+ Left Doppler Measurements +--------------+------+------+------------+ ! Location      ! Signal!Reflux! Reflux (sec)! +--------------+------+------+------------+ ! Common Femoral!Phasic! No therapy. ORBITS: The visualized portion of the orbits demonstrate no acute abnormality. SINUSES: The visualized paranasal sinuses and mastoid air cells are well aerated. BONES/SOFT TISSUES: The bone marrow signal intensity appears normal. The soft tissues demonstrate no acute abnormality. No acute intracranial abnormality. Decreased size of left parietal brain mass. Significant Diagnostic Studies at discharge:   CBC:   Lab Results   Component Value Date    WBC 6.4 07/23/2019    RBC 3.01 07/23/2019    HGB  07/23/2019     9.5  HGB DECREASE CALLED TO GALI FITZPATRICK RN ON 7/23/19 AT 0459 BY DARRICK JARVIS [facility]  RESULTS READ BACK      HCT 29.0 07/23/2019    MCV 96.3 07/23/2019    MCH 31.6 07/23/2019    MCHC 32.8 07/23/2019    RDW 14.8 07/23/2019     07/23/2019    MPV 9.6 07/23/2019       Patient Instructions:     Medication List      START taking these medications    * rivaroxaban 15 MG Tabs tablet  Commonly known as:  XARELTO  Take 1 tablet by mouth 2 times daily (with meals) for 19 days     * rivaroxaban 20 MG Tabs tablet  Commonly known as:  XARELTO  Take 1 tablet by mouth Daily with supper Start this once 15mg BID for 19 days has been completed  Start taking on:  8/17/2019         * This list has 2 medication(s) that are the same as other medications prescribed for you. Read the directions carefully, and ask your doctor or other care provider to review them with you. CHANGE how you take these medications    levETIRAcetam 750 MG tablet  Commonly known as:  KEPPRA  Take 1 tablet by mouth 2 times daily  What changed:    · medication strength  · how much to take        CONTINUE taking these medications    b complex vitamins capsule     dronabinol 5 MG capsule  Commonly known as:  MARINOL     gabapentin 300 MG capsule  Commonly known as:  NEURONTIN     LORazepam 0.5 MG tablet  Commonly known as:  ATIVAN  Take 1 tablet by mouth nightly as needed for Anxiety for up to 10 days.      losartan 50 MG

## 2019-07-29 NOTE — PROGRESS NOTES
Clinically stable. No acute complaints. Feels stronger. Afebrile, no distress. Awake and alert. RLE swelling. A/P:  Awaiting for placement. If he is discharged, I will follow up as outpatient. Thanks.

## 2019-07-29 NOTE — PROGRESS NOTES
acute osseous abnormality. Foci of soft tissue gas are noted within the anterior subcutaneous soft tissues and may be related to injection sites or recent procedure. Postsurgical changes from cysto prostatectomy. Calcification along the posterior aspect favoring suture material over stones. Stable 3 mm left upper lobe pulmonary nodule which can be followed on subsequent exams. Otherwise no findings of metastatic disease in the chest, abdomen or pelvis. Foci of soft tissue gas are noted in the anterior subcutaneous soft tissues either related to recent injection sites and/or procedure. Ct Head Wo Contrast    Result Date: 7/22/2019  EXAMINATION: CT OF THE HEAD WITHOUT CONTRAST  7/21/2019 11:25 pm TECHNIQUE: CT of the head was performed without the administration of intravenous contrast. Dose modulation, iterative reconstruction, and/or weight based adjustment of the mA/kV was utilized to reduce the radiation dose to as low as reasonably achievable. COMPARISON: 05/29/2019 HISTORY: ORDERING SYSTEM PROVIDED HISTORY: CONFUSION/DELIRIUM, ALTERED LOC, UNEXPLAINED TECHNOLOGIST PROVIDED HISTORY: Has a \"code stroke\" or \"stroke alert\" been called? ->No Reason for Exam: ams Patient with known brain tumor status post gamma knife surgery in January FINDINGS: BRAIN/VENTRICLES: There is no acute intracranial hemorrhage, mass effect or midline shift. Evaluation for mass lesion is limited without intravenous contrast.  The previous exam showed a tumor in the high left parietal lobe which is not identified on today's noncontrast exam.  There is decreased vasogenic edema in this region compared to the prior study. No noncontrast evidence of new mass lesion. No abnormal extra-axial fluid collection. No evidence of recent territorial infarct. There are nonspecific areas of hypoattenuation in the periventricular white matter and centrum semiovale that are likely related to chronic small vessel ischemic disease.    The ventricles and cisternal spaces are prominent consistent with cerebral atrophy. There is no evidence of hydrocephalus. There is intracranial atherosclerosis. ORBITS: The visualized portion of the orbits demonstrate no acute abnormality. SINUSES: The visualized paranasal sinuses and mastoid air cells demonstrate no acute abnormality. SOFT TISSUES/SKULL:  No acute abnormality of the visualized skull or soft tissues. 1. No acute intracranial abnormality. 2. Decreased vasogenic edema in the high left parietal region in the area of previously seen mass lesion. Evaluation for mass is limited without intravenous contrast.     Ct Head Wo Contrast    Result Date: 7/2/2019  CT HEAD WO CONTRAST Indication: Fall, head injury. Comparison: MRI brain 6/19/19 Technique:  CT head was performed without intravenous contrast. Coronal and sagittal reformations obtained. Up-to-date CT equipment and radiation dose reduction techniques were employed. Findings: There is no evidence of acute intracranial hemorrhage or extra-axial fluid collection. Redemonstration of localized vasogenic edema in the left frontoparietal lobe at the site of known metastasis, similar to the comparison MRI. No new or increasing mass effect. There is no midline shift. Mild hypoattenuation in the deep periventricular white matter nonspecific but compatible with chronic small vessel ischemic disease. Ventricles are normal in size. Basal cisterns are patent. Atherosclerotic calcifications the distal internal carotid arteries. The calvarium is intact. Paranasal sinuses and mastoid air cells are clear. 1.  No acute intracranial hemorrhage. 2.  Localized vasogenic edema from known left frontoparietal lobe metastasis without significant change.     Ct Chest Wo Contrast    Result Date: 7/17/2019  EXAMINATION: CT OF THE ABDOMEN AND PELVIS WITHOUT CONTRAST; CT OF THE CHEST WITHOUT CONTRAST 7/15/2019 9:58 am TECHNIQUE: CT of the abdomen and pelvis was performed superficial venous  thrombophlebitis involving the right lower extremity, as described above. Signature   ------------------------------------------------------------------  Electronically signed by Daryl Marsh MD (Interpreting  physician) on 07/02/2019 at 03:04 PM  ------------------------------------------------------------------  Patient Status:Routine. Study [address] [place] [address] - Vascular Lab. Technical Quality:Good visualization.   - Results were reported to:Analilia at Dr. Valentino Few office [facility])     at 8:30 am on 7/2/19. Risk Factors History +---------+----+------------------------------------------------+ ! Diagnosis! Date! Comments                                        ! +---------+----+------------------------------------------------+ ! Other    ! !H/O bladder and brain cancer, OA, diverticulitis! +---------+----+------------------------------------------------+   - The patient's risk factor(s) include: arterial hypertension.   - The patient has a former tobacco history of 22 years . - The patient has cancer. Velocities are measured in cm/s ; Diameters are measured in mm Right Lower Extremities DVT Study Measurements Right 2D Measurements +------------------------+----------+---------------+----------+ ! Location                ! Visualized! Compressibility! Thrombosis! +------------------------+----------+---------------+----------+ ! Sapheno Femoral Junction! Yes       ! No             !Acute     ! +------------------------+----------+---------------+----------+ ! GSV Thigh               ! Yes       ! No             !Acute     ! +------------------------+----------+---------------+----------+ ! Common Femoral          !Yes       ! No             !Acute     ! +------------------------+----------+---------------+----------+ ! Femoral                 !Yes       ! No             !Acute     ! +------------------------+----------+---------------+----------+ ! Prox Femoral            !Yes !Absent! No    !            ! +------------------------+------+------+------------+ ! Prox Femoral            !Absent! No    !            ! +------------------------+------+------+------------+ ! Deep Femoral            !Absent! No    !            ! +------------------------+------+------+------------+ ! Popliteal               !Absent! No    !            ! +------------------------+------+------+------------+ Left Lower Extremities DVT Study Measurements Left 2D Measurements +--------------+----------+---------------+----------+ ! Location      ! Visualized! Compressibility! Thrombosis! +--------------+----------+---------------+----------+ ! Common Femoral!Yes       ! Yes            ! None      ! +--------------+----------+---------------+----------+ Left Doppler Measurements +--------------+------+------+------------+ ! Location      ! Signal!Reflux! Reflux (sec)! +--------------+------+------+------------+ ! Common Femoral!Phasic! No    !            ! +--------------+------+------+------------+    Vl Dup Lower Extremity Venous Right    Result Date: 7/22/2019  EXAMINATION: DUPLEX VENOUS ULTRASOUND OF THE RIGHT LOWER EXTREMITY, 7/21/2019 11:28 pm TECHNIQUE: Duplex ultrasound and Doppler images were obtained of the right lower extremity. COMPARISON: None. HISTORY: ORDERING SYSTEM PROVIDED HISTORY: right leg swelling TECHNOLOGIST PROVIDED HISTORY: Reason for exam:->right leg swelling Reason for Exam: Rt leg pain and swelling Acuity: Acute Type of Exam: Initial FINDINGS: Occlusive thrombus is seen throughout the imaged veins of the lower extremity including the common femoral, superficial femoral, popliteal posterior tibial and anterior tibial veins. The peroneal vein is not seen. Extensive deep VT throughout the entire right leg from the common femoral vein through the calf. Critical results were called by Dr. Baylee Salgado MD to Maite Hoyos on 7/22/2019 at 00:13.      Mri Brain W Wo Contrast    Result Date:

## 2019-07-29 NOTE — PROGRESS NOTES
visual cues for technique, pt SBA seated to perform BUE therapeutic resistive exercises c Blue Theraband x5 reps each x4 sets c two rest breaks to recover from increased heart rate and RR. All therapeutic intervention performed c emphasis on dynamic balance / standing tolerance to inc strength, endurance and act tolerance for inc Indep c ADL tasks, func transfers and in prep for functional mobility. Safety  Patient safely in bedside chair + alarm at end of session, with call light/phone in reach, and nursing aware. Gait belt was used for func transfers. Assessment / Impression:  Pt continues to be limited by pain, RLE, and dec cognition as evidenced by orientation to self and place only, and need for sequential cues. Patient's tolerance of treatment:  Fair+   Adverse Reaction: None  Significant change in status and impact:  None  Barriers to improvement:  Pain, dec cognition, decreased strength. Plan for Next Session:    Continue per OT POC c plan to address ADLs. Time in:  1038   Time out:  1120  Timed treatment minutes:  42  Total treatment time:  42    Electronically signed by:    TITUS Koroma  7/29/2019, 10:35 AM    Previously filed values:       Goals:  1. Pt will complete all aspects of bed mobility for EOB/OOB ADLs with supervision/HOB flat  2. Pt will complete UB/LB bathing min A with setup using long handled sponge PRN  3. Pt will complete all aspects of LB dressing mod A with setup using AE PRN  4. Pt will complete all functional transfers to and from bed, chair, toilet, shower chair min A  5. Pt will ambulate HH distance to bathroom for toileting mod A x 2 with RW  6. Pt will complete all aspects of toileting task mod A  7. Pt will complete ther ex/ther act with focus on UE strengthening with blue Theraband, standing tolerance >5 minutes for ADL tasks  8.  Pt will complete oral hygiene/grooming routine in standing at sink CGA with setup/no seated rest

## 2019-07-29 NOTE — PROGRESS NOTES
Hospitalist Progress Note      Name:  Ethan Felix /Age/Sex: 1950  (77 y.o. male)   MRN & CSN:  [id number] & [id number] Admission Date/Time: 2019  9:36 PM   Location:  [address] [place] [address] PCP: Nayan Andrea MD       Ethan Felix is a 77 y.o.  male  who presents with Altered Mental Status      Assessment and Plan:   Acute Metabolic Encephalopathy  - clinically resolved  - CT head: no acute process  - UA shows some WBC's will check cx NTD  - check ammonia: WNL   - Neurology consulted on admission  - MRI head: neg, decreased size of left parietal brain mass  - carotid duplex; 0-50% stenosis b/l     RLE DVT  - on full dose lovenox, d/w pt and would like to change to oral NOAC, started on xarelto 15mg BID x 21 days then maintenance 20mg daily  - no signs or symptoms of PE     Generalized weakness/deconditioning:  - Physical therapy consultation.  - Patient has home PT/OT. Dizziness  -stable  - likely orthostatic. 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 Neuropathy  Insomnia     ARU got declined, now [facility] pending per CM.  Medically stable for d/c            Diet DIET GENERAL;   Code Status Prior     Medications:   Medications:    rivaroxaban  15 mg Oral BID WC    Followed by   Val Sage ON 2019] rivaroxaban  20 mg Oral Dinner    dronabinol  5 mg Oral BID     gabapentin  300 mg Oral TID    lactobacillus  1 capsule Oral QAM    losartan  50 mg Oral Nightly    mirtazapine  15 mg Oral Nightly    metoprolol succinate  25 mg Oral Daily    levETIRAcetam  750 mg Oral BID      Infusions:   PRN Meds:   acetaminophen 650 mg Q4H PRN     Subjective:   Doing ok no

## 2019-07-29 NOTE — PROGRESS NOTES
NEUROLOGY NOTE  DR. Lucrecia House MD.  -------------------------------------------------  Subjective:    pts leg strength is doing a lot better    Pt denies any new symptoms    Pt is able to tolerate lower dose of decadron and is stable today    Pt states he feels better    Denies any new symptoms    Doing better. Denies any new symptoms. Denies headache nausea vomiting dizziness    Denies numbness weakness extremities    Denies blurring of vision double vision    Objective:    BP (!) 153/79   Pulse 79   Temp 98.3 °F (36.8 °C) (Oral)   Resp 16   Ht 5' 11\" (1.803 m)   Wt 193 lb 14.4 oz (88 kg)   SpO2 97%   BMI 27.04 kg/m²   HEENT nl      Neuro exam    Alert Oriented  X 3  Follow simple commands  EOMI Pupils 3 mm buddy  Moves extremtiies  5/5 all 4 extremities      RADIOLOGY  -----------------    Ct Abdomen Pelvis Wo Contrast Additional Contrast? Oral    Result Date: 7/17/2019  EXAMINATION: CT OF THE ABDOMEN AND PELVIS WITHOUT CONTRAST; CT OF THE CHEST WITHOUT CONTRAST 7/15/2019 9:58 am TECHNIQUE: CT of the abdomen and pelvis was performed without the administration of intravenous contrast. Multiplanar reformatted images are provided for review. Dose modulation, iterative reconstruction, and/or weight based adjustment of the mA/kV was utilized to reduce the radiation dose to as low as reasonably achievable.; CT of the chest was performed without the administration of intravenous contrast. Multiplanar reformatted images are provided for review. Dose modulation, iterative reconstruction, and/or weight based adjustment of the mA/kV was utilized to reduce the radiation dose to as low as reasonably achievable.  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 The heart size within normal limits. Mitral valvular calcifications. No pericardial effusion. Thoracic aorta is normal in caliber. The main pulmonary artery is normal caliber. Coronary arterial calcifications are noted. The esophagus is unremarkable. Lungs/pleura: Bibasilar scarring with associated calcifications. No focal consolidation, pleural effusion or pneumothorax. No pleural effusion or pneumothorax. No suspicious pulmonary nodules. Central airways are patent. Stable 3 mm nodule in the left upper lobe anteriorly. Soft Tissues/Bones: Right-sided chest port in place with the tip terminating in the distal SVC. No acute osseous abnormality. Degenerative changes of thoracic spine. Abdomen/Pelvis: Organs: The unenhanced liver, spleen, pancreas, gallbladder and adrenal glands are without focal abnormality. No renal or ureteral calculus. No hydronephrosis or perinephric stranding. The kidneys are lobular in contour. GI/Bowel: No dilated loops of bowel or bowel wall thickening. Colonic diverticulosis without acute diverticulitis. No extraluminal contrast or free air. The appendix is normal. Pelvis: Postsurgical changes from cysto prostatectomy with an ileal conduit in the right lower quadrant. Calcifications are noted along the posterior aspect of the conduit which may reflect stones or suture material.  No pelvic adenopathy or free fluid. Peritoneum/Retroperitoneum: The aorta is normal caliber with mild atherosclerosis. No retroperitoneal or mesenteric adenopathy. No ascites or drainable fluid collection.  Bones/Soft Tissues: No ventricles and cisternal spaces are prominent consistent with cerebral atrophy. There is no evidence of hydrocephalus. There is intracranial atherosclerosis. ORBITS: The visualized portion of the orbits demonstrate no acute abnormality. SINUSES: The visualized paranasal sinuses and mastoid air cells demonstrate no acute abnormality. SOFT TISSUES/SKULL:  No acute abnormality of the visualized skull or soft tissues. 1. No acute intracranial abnormality. 2. Decreased vasogenic edema in the high left parietal region in the area of previously seen mass lesion. Evaluation for mass is limited without intravenous contrast.     Ct Head Wo Contrast    Result Date: 7/2/2019  CT HEAD WO CONTRAST Indication: Fall, head injury. Comparison: MRI brain 6/19/19 Technique:  CT head was performed without intravenous contrast. Coronal and sagittal reformations obtained. Up-to-date CT equipment and radiation dose reduction techniques were employed. Findings: There is no evidence of acute intracranial hemorrhage or extra-axial fluid collection. Redemonstration of localized vasogenic edema in the left frontoparietal lobe at the site of known metastasis, similar to the comparison MRI. No new or increasing mass effect. There is no midline shift. Mild hypoattenuation in the deep periventricular white matter nonspecific but compatible with chronic small vessel ischemic disease. Ventricles are normal in size. Basal cisterns are patent. Atherosclerotic calcifications the distal internal carotid arteries. The calvarium is intact. Paranasal sinuses and mastoid air cells are clear. 1.  No acute intracranial hemorrhage. 2.  Localized vasogenic edema from known left frontoparietal lobe metastasis without significant change.     Ct Chest Wo Contrast    Result Date: 7/17/2019  EXAMINATION: CT OF THE ABDOMEN AND PELVIS WITHOUT CONTRAST; CT OF THE CHEST WITHOUT CONTRAST 7/15/2019 9:58 am TECHNIQUE: CT of the abdomen and pelvis was performed without the administration of intravenous contrast. Multiplanar reformatted images are provided for review. Dose modulation, iterative reconstruction, and/or weight based adjustment of the mA/kV was utilized to reduce the radiation dose to as low as reasonably achievable.; CT of the chest was performed without the administration of intravenous contrast. Multiplanar reformatted images are provided for review. Dose modulation, iterative reconstruction, and/or weight based adjustment of the mA/kV was utilized to reduce the radiation dose to as low as reasonably achievable. 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 The heart size within normal limits. Mitral valvular calcifications. No pericardial effusion. Thoracic aorta is normal in caliber. The main pulmonary artery is normal caliber. Coronary arterial calcifications are noted. The esophagus is unremarkable. Lungs/pleura: Bibasilar scarring with associated calcifications. No focal consolidation, pleural effusion or pneumothorax. No pleural effusion or pneumothorax. No suspicious pulmonary nodules. Central airways are patent. Stable 3 mm nodule in the left upper lobe anteriorly. Soft Tissues/Bones: Right-sided chest port in place with the tip terminating in the distal SVC. No acute osseous abnormality.   Degenerative changes of superficial venous  thrombophlebitis involving the right lower extremity, as described above. Signature   ------------------------------------------------------------------  Electronically signed by Estrellita Mcburney, MD (Interpreting  physician) on 07/02/2019 at 03:04 PM  ------------------------------------------------------------------  Patient Status:Routine. Study [address] [place] [address] - Vascular Lab. Technical Quality:Good visualization.   - Results were reported to:Analilia at Dr. Richi Jiménez office [facility])     at 8:30 am on 7/2/19. Risk Factors History +---------+----+------------------------------------------------+ ! Diagnosis! Date! Comments                                        ! +---------+----+------------------------------------------------+ ! Other    ! !H/O bladder and brain cancer, OA, diverticulitis! +---------+----+------------------------------------------------+   - The patient's risk factor(s) include: arterial hypertension.   - The patient has a former tobacco history of 22 years . - The patient has cancer. Velocities are measured in cm/s ; Diameters are measured in mm Right Lower Extremities DVT Study Measurements Right 2D Measurements +------------------------+----------+---------------+----------+ ! Location                ! Visualized! Compressibility! Thrombosis! +------------------------+----------+---------------+----------+ ! Sapheno Femoral Junction! Yes       ! No             !Acute     ! +------------------------+----------+---------------+----------+ ! GSV Thigh               ! Yes       ! No             !Acute     ! +------------------------+----------+---------------+----------+ ! Common Femoral          !Yes       ! No             !Acute     ! +------------------------+----------+---------------+----------+ ! Femoral                 !Yes       ! No             !Acute     ! +------------------------+----------+---------------+----------+ ! Prox Femoral            !Yes !No             !Acute     ! +------------------------+----------+---------------+----------+ ! Mid Femoral             !Yes       ! No             !Acute     ! +------------------------+----------+---------------+----------+ ! Dist Femoral            !Yes       ! No             !Acute     ! +------------------------+----------+---------------+----------+ ! Deep Femoral            !Yes       ! No             !Acute     ! +------------------------+----------+---------------+----------+ ! Popliteal               !Yes       ! No             !Acute     ! +------------------------+----------+---------------+----------+ ! GSV Below Knee          ! Yes       ! No             !Acute     ! +------------------------+----------+---------------+----------+ ! Gastroc                 ! Yes       ! No             !Acute     ! +------------------------+----------+---------------+----------+ ! Soleal                  !Yes       ! Yes            ! None      ! +------------------------+----------+---------------+----------+ ! PTV                     ! Yes       ! No             !Acute     ! +------------------------+----------+---------------+----------+ ! Peroneal                !Yes       ! No             !Acute     ! +------------------------+----------+---------------+----------+ ! GSV Calf                ! Yes       ! Yes            ! None      ! +------------------------+----------+---------------+----------+ ! SSV                     ! Yes       ! No             !Acute     ! +------------------------+----------+---------------+----------+ Right Doppler Measurements +------------------------+------+------+------------+ ! Location                ! Signal!Reflux! Reflux (sec)! +------------------------+------+------+------------+ ! Sapheno Femoral Junction! Absent! No    !            ! +------------------------+------+------+------------+ ! Common Femoral          !Absent! No    !            ! +------------------------+------+------+------------+ ! Femoral !Absent! No    !            ! +------------------------+------+------+------------+ ! Prox Femoral            !Absent! No    !            ! +------------------------+------+------+------------+ ! Deep Femoral            !Absent! No    !            ! +------------------------+------+------+------------+ ! Popliteal               !Absent! No    !            ! +------------------------+------+------+------------+ Left Lower Extremities DVT Study Measurements Left 2D Measurements +--------------+----------+---------------+----------+ ! Location      ! Visualized! Compressibility! Thrombosis! +--------------+----------+---------------+----------+ ! Common Femoral!Yes       ! Yes            ! None      ! +--------------+----------+---------------+----------+ Left Doppler Measurements +--------------+------+------+------------+ ! Location      ! Signal!Reflux! Reflux (sec)! +--------------+------+------+------------+ ! Common Femoral!Phasic! No    !            ! +--------------+------+------+------------+    Vl Dup Lower Extremity Venous Right    Result Date: 7/22/2019  EXAMINATION: DUPLEX VENOUS ULTRASOUND OF THE RIGHT LOWER EXTREMITY, 7/21/2019 11:28 pm TECHNIQUE: Duplex ultrasound and Doppler images were obtained of the right lower extremity. COMPARISON: None. HISTORY: ORDERING SYSTEM PROVIDED HISTORY: right leg swelling TECHNOLOGIST PROVIDED HISTORY: Reason for exam:->right leg swelling Reason for Exam: Rt leg pain and swelling Acuity: Acute Type of Exam: Initial FINDINGS: Occlusive thrombus is seen throughout the imaged veins of the lower extremity including the common femoral, superficial femoral, popliteal posterior tibial and anterior tibial veins. The peroneal vein is not seen. Extensive deep VT throughout the entire right leg from the common femoral vein through the calf. Critical results were called by Dr. Rosas Jett MD to Uma Singh on 7/22/2019 at 00:13.      Mri Brain W Wo Contrast    Result Date: noted     Continue  keppra     Continue  decadron     Orthostatic Bp Hr neg     pts wife wants him to be followed by Dr. Benjamín Espinoza and the neurologist sen by him before and I will not be following him as out patient. Pt is stable and doing better. pt is waiting placement. ARU option was denied by [employer]    pts legs are stronger today. doing a lot better. Discussed with pts SO.       Electronically signed by Eden Dias MD on 7/28/2019 at 11:50 PM

## 2019-07-30 LAB
EKG ATRIAL RATE: 90 BPM
EKG DIAGNOSIS: NORMAL
EKG P AXIS: 50 DEGREES
EKG P-R INTERVAL: 142 MS
EKG Q-T INTERVAL: 368 MS
EKG QRS DURATION: 86 MS
EKG QTC CALCULATION (BAZETT): 450 MS
EKG R AXIS: -32 DEGREES
EKG T AXIS: 67 DEGREES
EKG VENTRICULAR RATE: 90 BPM

## 2019-08-20 ENCOUNTER — HOSPITAL ENCOUNTER (INPATIENT)
Age: 69
LOS: 8 days | Discharge: NURSING, REHAB OR LONG-TERM CARE | DRG: 673 | End: 2019-08-28
Attending: EMERGENCY MEDICINE | Admitting: INTERNAL MEDICINE
Payer: MEDICARE

## 2019-08-20 ENCOUNTER — APPOINTMENT (OUTPATIENT)
Dept: GENERAL RADIOLOGY | Age: 69
DRG: 673 | End: 2019-08-20
Payer: MEDICARE

## 2019-08-20 ENCOUNTER — APPOINTMENT (OUTPATIENT)
Dept: CT IMAGING | Age: 69
DRG: 673 | End: 2019-08-20
Payer: MEDICARE

## 2019-08-20 VITALS — WEIGHT: 181 LBS | HEIGHT: 71 IN | BODY MASS INDEX: 25.34 KG/M2

## 2019-08-20 DIAGNOSIS — T83.511A URINARY TRACT INFECTION ASSOCIATED WITH CATHETERIZATION OF URINARY TRACT, UNSPECIFIED INDWELLING URINARY CATHETER TYPE, INITIAL ENCOUNTER (HCC): ICD-10-CM

## 2019-08-20 DIAGNOSIS — I82.4Y9 ACUTE DEEP VEIN THROMBOSIS (DVT) OF PROXIMAL VEIN OF LOWER EXTREMITY, UNSPECIFIED LATERALITY (HCC): ICD-10-CM

## 2019-08-20 DIAGNOSIS — R41.82 ALTERED MENTAL STATUS, UNSPECIFIED ALTERED MENTAL STATUS TYPE: Primary | ICD-10-CM

## 2019-08-20 DIAGNOSIS — N39.0 URINARY TRACT INFECTION ASSOCIATED WITH CATHETERIZATION OF URINARY TRACT, UNSPECIFIED INDWELLING URINARY CATHETER TYPE, INITIAL ENCOUNTER (HCC): ICD-10-CM

## 2019-08-20 DIAGNOSIS — I95.9 HYPOTENSION, UNSPECIFIED HYPOTENSION TYPE: ICD-10-CM

## 2019-08-20 DIAGNOSIS — C80.1 SMALL CELL CARCINOMA (HCC): ICD-10-CM

## 2019-08-20 LAB
ALBUMIN SERPL-MCNC: 2.8 GM/DL (ref 3.4–5)
ALP BLD-CCNC: 49 IU/L (ref 40–129)
ALT SERPL-CCNC: 10 U/L (ref 10–40)
AMMONIA: 30 UMOL/L (ref 16–60)
ANION GAP SERPL CALCULATED.3IONS-SCNC: 14 MMOL/L (ref 4–16)
APTT: 75.4 SECONDS (ref 21.2–33)
AST SERPL-CCNC: 16 IU/L (ref 15–37)
BACTERIA: ABNORMAL /HPF
BASOPHILS ABSOLUTE: 0 K/CU MM
BASOPHILS RELATIVE PERCENT: 0.5 % (ref 0–1)
BILIRUB SERPL-MCNC: 0.2 MG/DL (ref 0–1)
BILIRUBIN URINE: NEGATIVE MG/DL
BLOOD, URINE: ABNORMAL
BUN BLDV-MCNC: 16 MG/DL (ref 6–23)
CALCIUM SERPL-MCNC: 8.8 MG/DL (ref 8.3–10.6)
CHLORIDE BLD-SCNC: 100 MMOL/L (ref 99–110)
CLARITY: ABNORMAL
CO2: 18 MMOL/L (ref 21–32)
COLOR: ABNORMAL
CREAT SERPL-MCNC: 1.1 MG/DL (ref 0.9–1.3)
DIFFERENTIAL TYPE: ABNORMAL
EOSINOPHILS ABSOLUTE: 0 K/CU MM
EOSINOPHILS RELATIVE PERCENT: 0.2 % (ref 0–3)
GFR AFRICAN AMERICAN: >60 ML/MIN/1.73M2
GFR NON-AFRICAN AMERICAN: >60 ML/MIN/1.73M2
GLUCOSE BLD-MCNC: 132 MG/DL (ref 70–99)
GLUCOSE, URINE: NEGATIVE MG/DL
HCT VFR BLD CALC: 35.5 % (ref 42–52)
HEMOGLOBIN: 10.7 GM/DL (ref 13.5–18)
IMMATURE NEUTROPHIL %: 0.5 % (ref 0–0.43)
INR BLD: 1.15 INDEX
KETONES, URINE: ABNORMAL MG/DL
LACTATE: 1.1 MMOL/L (ref 0.4–2)
LEUKOCYTE ESTERASE, URINE: ABNORMAL
LYMPHOCYTES ABSOLUTE: 0.7 K/CU MM
LYMPHOCYTES RELATIVE PERCENT: 16.1 % (ref 24–44)
MCH RBC QN AUTO: 31.8 PG (ref 27–31)
MCHC RBC AUTO-ENTMCNC: 30.1 % (ref 32–36)
MCV RBC AUTO: 105.3 FL (ref 78–100)
MONOCYTES ABSOLUTE: 0.5 K/CU MM
MONOCYTES RELATIVE PERCENT: 12.4 % (ref 0–4)
MUCUS: ABNORMAL HPF
NITRITE URINE, QUANTITATIVE: NEGATIVE
NUCLEATED RBC %: 0 %
PDW BLD-RTO: 16.9 % (ref 11.7–14.9)
PH, URINE: 6 (ref 5–8)
PLATELET # BLD: 346 K/CU MM (ref 140–440)
PMV BLD AUTO: 9.2 FL (ref 7.5–11.1)
POTASSIUM SERPL-SCNC: 3.6 MMOL/L (ref 3.5–5.1)
PRO-BNP: 188.5 PG/ML
PROCALCITONIN: 0.09
PROTEIN UA: NEGATIVE MG/DL
PROTHROMBIN TIME: 13.1 SECONDS (ref 9.12–12.5)
RBC # BLD: 3.37 M/CU MM (ref 4.6–6.2)
RBC URINE: ABNORMAL /HPF (ref 0–3)
SEGMENTED NEUTROPHILS ABSOLUTE COUNT: 3 K/CU MM
SEGMENTED NEUTROPHILS RELATIVE PERCENT: 70.3 % (ref 36–66)
SODIUM BLD-SCNC: 132 MMOL/L (ref 135–145)
SPECIFIC GRAVITY UA: 1.01 (ref 1–1.03)
TOTAL IMMATURE NEUTOROPHIL: 0.02 K/CU MM
TOTAL NUCLEATED RBC: 0 K/CU MM
TOTAL PROTEIN: 5.5 GM/DL (ref 6.4–8.2)
TRICHOMONAS: ABNORMAL /HPF
TROPONIN T: 0.02 NG/ML
TSH HIGH SENSITIVITY: 2.38 UIU/ML (ref 0.27–4.2)
UROBILINOGEN, URINE: NORMAL MG/DL (ref 0.2–1)
WBC # BLD: 4.3 K/CU MM (ref 4–10.5)
WBC UA: 12 /HPF (ref 0–2)

## 2019-08-20 PROCEDURE — 80053 COMPREHEN METABOLIC PANEL: CPT

## 2019-08-20 PROCEDURE — 83880 ASSAY OF NATRIURETIC PEPTIDE: CPT

## 2019-08-20 PROCEDURE — 87086 URINE CULTURE/COLONY COUNT: CPT

## 2019-08-20 PROCEDURE — 82140 ASSAY OF AMMONIA: CPT

## 2019-08-20 PROCEDURE — 85730 THROMBOPLASTIN TIME PARTIAL: CPT

## 2019-08-20 PROCEDURE — 2580000003 HC RX 258: Performed by: EMERGENCY MEDICINE

## 2019-08-20 PROCEDURE — 87040 BLOOD CULTURE FOR BACTERIA: CPT

## 2019-08-20 PROCEDURE — 87186 SC STD MICRODIL/AGAR DIL: CPT

## 2019-08-20 PROCEDURE — 84145 PROCALCITONIN (PCT): CPT

## 2019-08-20 PROCEDURE — 87184 SC STD DISK METHOD PER PLATE: CPT

## 2019-08-20 PROCEDURE — 84484 ASSAY OF TROPONIN QUANT: CPT

## 2019-08-20 PROCEDURE — 71045 X-RAY EXAM CHEST 1 VIEW: CPT

## 2019-08-20 PROCEDURE — 86141 C-REACTIVE PROTEIN HS: CPT

## 2019-08-20 PROCEDURE — 70450 CT HEAD/BRAIN W/O DYE: CPT

## 2019-08-20 PROCEDURE — 85610 PROTHROMBIN TIME: CPT

## 2019-08-20 PROCEDURE — 99285 EMERGENCY DEPT VISIT HI MDM: CPT

## 2019-08-20 PROCEDURE — 83605 ASSAY OF LACTIC ACID: CPT

## 2019-08-20 PROCEDURE — 84443 ASSAY THYROID STIM HORMONE: CPT

## 2019-08-20 PROCEDURE — 2060000000 HC ICU INTERMEDIATE R&B

## 2019-08-20 PROCEDURE — 36415 COLL VENOUS BLD VENIPUNCTURE: CPT

## 2019-08-20 PROCEDURE — 81001 URINALYSIS AUTO W/SCOPE: CPT

## 2019-08-20 PROCEDURE — 85025 COMPLETE CBC W/AUTO DIFF WBC: CPT

## 2019-08-20 PROCEDURE — 87077 CULTURE AEROBIC IDENTIFY: CPT

## 2019-08-20 PROCEDURE — 93005 ELECTROCARDIOGRAM TRACING: CPT | Performed by: EMERGENCY MEDICINE

## 2019-08-20 RX ORDER — 0.9 % SODIUM CHLORIDE 0.9 %
1000 INTRAVENOUS SOLUTION INTRAVENOUS ONCE
Status: COMPLETED | OUTPATIENT
Start: 2019-08-20 | End: 2019-08-21

## 2019-08-20 RX ORDER — 0.9 % SODIUM CHLORIDE 0.9 %
1000 INTRAVENOUS SOLUTION INTRAVENOUS ONCE
Status: COMPLETED | OUTPATIENT
Start: 2019-08-20 | End: 2019-08-20

## 2019-08-20 RX ADMIN — SODIUM CHLORIDE 1000 ML: 9 INJECTION, SOLUTION INTRAVENOUS at 22:30

## 2019-08-20 ASSESSMENT — ENCOUNTER SYMPTOMS
ABDOMINAL PAIN: 0
BACK PAIN: 0
COUGH: 0
SHORTNESS OF BREATH: 0

## 2019-08-21 PROBLEM — E43 SEVERE MALNUTRITION (HCC): Chronic | Status: ACTIVE | Noted: 2019-08-21

## 2019-08-21 LAB
ANION GAP SERPL CALCULATED.3IONS-SCNC: 13 MMOL/L (ref 4–16)
BASOPHILS ABSOLUTE: 0 K/CU MM
BASOPHILS RELATIVE PERCENT: 0.5 % (ref 0–1)
BUN BLDV-MCNC: 15 MG/DL (ref 6–23)
CALCIUM SERPL-MCNC: 8.5 MG/DL (ref 8.3–10.6)
CHLORIDE BLD-SCNC: 103 MMOL/L (ref 99–110)
CO2: 20 MMOL/L (ref 21–32)
CREAT SERPL-MCNC: 0.8 MG/DL (ref 0.9–1.3)
DIFFERENTIAL TYPE: ABNORMAL
EOSINOPHILS ABSOLUTE: 0 K/CU MM
EOSINOPHILS RELATIVE PERCENT: 1 % (ref 0–3)
GFR AFRICAN AMERICAN: >60 ML/MIN/1.73M2
GFR NON-AFRICAN AMERICAN: >60 ML/MIN/1.73M2
GLUCOSE BLD-MCNC: 87 MG/DL (ref 70–99)
HCT VFR BLD CALC: 33.4 % (ref 42–52)
HEMOGLOBIN: 10.5 GM/DL (ref 13.5–18)
HIGH SENSITIVE C-REACTIVE PROTEIN: 7.7 MG/L
IMMATURE NEUTROPHIL %: 0.5 % (ref 0–0.43)
LYMPHOCYTES ABSOLUTE: 1.1 K/CU MM
LYMPHOCYTES RELATIVE PERCENT: 28 % (ref 24–44)
MAGNESIUM: 1.8 MG/DL (ref 1.8–2.4)
MCH RBC QN AUTO: 31.8 PG (ref 27–31)
MCHC RBC AUTO-ENTMCNC: 31.4 % (ref 32–36)
MCV RBC AUTO: 101.2 FL (ref 78–100)
MONOCYTES ABSOLUTE: 0.6 K/CU MM
MONOCYTES RELATIVE PERCENT: 14.4 % (ref 0–4)
NUCLEATED RBC %: 0 %
PDW BLD-RTO: 16.5 % (ref 11.7–14.9)
PLATELET # BLD: 327 K/CU MM (ref 140–440)
PMV BLD AUTO: 9.1 FL (ref 7.5–11.1)
POTASSIUM SERPL-SCNC: 3.4 MMOL/L (ref 3.5–5.1)
PROCALCITONIN: 0.07
RBC # BLD: 3.3 M/CU MM (ref 4.6–6.2)
SEGMENTED NEUTROPHILS ABSOLUTE COUNT: 2.2 K/CU MM
SEGMENTED NEUTROPHILS RELATIVE PERCENT: 55.6 % (ref 36–66)
SODIUM BLD-SCNC: 136 MMOL/L (ref 135–145)
TOTAL IMMATURE NEUTOROPHIL: 0.02 K/CU MM
TOTAL NUCLEATED RBC: 0 K/CU MM
WBC # BLD: 4 K/CU MM (ref 4–10.5)

## 2019-08-21 PROCEDURE — 36591 DRAW BLOOD OFF VENOUS DEVICE: CPT

## 2019-08-21 PROCEDURE — 6360000002 HC RX W HCPCS: Performed by: EMERGENCY MEDICINE

## 2019-08-21 PROCEDURE — 80048 BASIC METABOLIC PNL TOTAL CA: CPT

## 2019-08-21 PROCEDURE — 85025 COMPLETE CBC W/AUTO DIFF WBC: CPT

## 2019-08-21 PROCEDURE — 2580000003 HC RX 258: Performed by: EMERGENCY MEDICINE

## 2019-08-21 PROCEDURE — 83605 ASSAY OF LACTIC ACID: CPT

## 2019-08-21 PROCEDURE — 83735 ASSAY OF MAGNESIUM: CPT

## 2019-08-21 PROCEDURE — 6360000002 HC RX W HCPCS: Performed by: INTERNAL MEDICINE

## 2019-08-21 PROCEDURE — 6370000000 HC RX 637 (ALT 250 FOR IP): Performed by: INTERNAL MEDICINE

## 2019-08-21 PROCEDURE — 84145 PROCALCITONIN (PCT): CPT

## 2019-08-21 PROCEDURE — 2060000000 HC ICU INTERMEDIATE R&B

## 2019-08-21 PROCEDURE — 2580000003 HC RX 258: Performed by: INTERNAL MEDICINE

## 2019-08-21 PROCEDURE — 6370000000 HC RX 637 (ALT 250 FOR IP): Performed by: HOSPITALIST

## 2019-08-21 PROCEDURE — 93010 ELECTROCARDIOGRAM REPORT: CPT | Performed by: INTERNAL MEDICINE

## 2019-08-21 RX ORDER — ACETAMINOPHEN 325 MG/1
650 TABLET ORAL EVERY 4 HOURS PRN
Status: DISCONTINUED | OUTPATIENT
Start: 2019-08-21 | End: 2019-08-28 | Stop reason: HOSPADM

## 2019-08-21 RX ORDER — VENLAFAXINE HYDROCHLORIDE 37.5 MG/1
75 CAPSULE, EXTENDED RELEASE ORAL
Status: DISCONTINUED | OUTPATIENT
Start: 2019-08-21 | End: 2019-08-28 | Stop reason: HOSPADM

## 2019-08-21 RX ORDER — MIRTAZAPINE 15 MG/1
15 TABLET, ORALLY DISINTEGRATING ORAL NIGHTLY PRN
Status: DISCONTINUED | OUTPATIENT
Start: 2019-08-21 | End: 2019-08-28 | Stop reason: HOSPADM

## 2019-08-21 RX ORDER — POTASSIUM CHLORIDE 7.45 MG/ML
10 INJECTION INTRAVENOUS PRN
Status: DISCONTINUED | OUTPATIENT
Start: 2019-08-21 | End: 2019-08-28 | Stop reason: HOSPADM

## 2019-08-21 RX ORDER — POTASSIUM CHLORIDE 1.5 G/1.77G
40 POWDER, FOR SOLUTION ORAL PRN
Status: DISCONTINUED | OUTPATIENT
Start: 2019-08-21 | End: 2019-08-28 | Stop reason: HOSPADM

## 2019-08-21 RX ORDER — DRONABINOL 2.5 MG/1
5 CAPSULE ORAL
Status: DISCONTINUED | OUTPATIENT
Start: 2019-08-21 | End: 2019-08-28 | Stop reason: HOSPADM

## 2019-08-21 RX ORDER — SENNA AND DOCUSATE SODIUM 50; 8.6 MG/1; MG/1
1 TABLET, FILM COATED ORAL DAILY PRN
Status: DISCONTINUED | OUTPATIENT
Start: 2019-08-21 | End: 2019-08-28 | Stop reason: HOSPADM

## 2019-08-21 RX ORDER — GABAPENTIN 300 MG/1
300 CAPSULE ORAL 3 TIMES DAILY
Status: DISCONTINUED | OUTPATIENT
Start: 2019-08-21 | End: 2019-08-28 | Stop reason: HOSPADM

## 2019-08-21 RX ORDER — TRAMADOL HYDROCHLORIDE 50 MG/1
50 TABLET ORAL EVERY 4 HOURS PRN
Status: DISCONTINUED | OUTPATIENT
Start: 2019-08-21 | End: 2019-08-28 | Stop reason: HOSPADM

## 2019-08-21 RX ORDER — ONDANSETRON 2 MG/ML
4 INJECTION INTRAMUSCULAR; INTRAVENOUS EVERY 6 HOURS PRN
Status: DISCONTINUED | OUTPATIENT
Start: 2019-08-21 | End: 2019-08-28 | Stop reason: HOSPADM

## 2019-08-21 RX ORDER — PROCHLORPERAZINE EDISYLATE 5 MG/ML
10 INJECTION INTRAMUSCULAR; INTRAVENOUS EVERY 6 HOURS PRN
Status: DISCONTINUED | OUTPATIENT
Start: 2019-08-21 | End: 2019-08-28 | Stop reason: HOSPADM

## 2019-08-21 RX ORDER — LACTOBACILLUS RHAMNOSUS GG 10B CELL
1 CAPSULE ORAL EVERY MORNING
Status: DISCONTINUED | OUTPATIENT
Start: 2019-08-21 | End: 2019-08-28 | Stop reason: HOSPADM

## 2019-08-21 RX ORDER — SODIUM CHLORIDE 9 MG/ML
INJECTION, SOLUTION INTRAVENOUS CONTINUOUS
Status: DISCONTINUED | OUTPATIENT
Start: 2019-08-21 | End: 2019-08-22

## 2019-08-21 RX ORDER — POTASSIUM CHLORIDE 20 MEQ/1
40 TABLET, EXTENDED RELEASE ORAL PRN
Status: DISCONTINUED | OUTPATIENT
Start: 2019-08-21 | End: 2019-08-28 | Stop reason: HOSPADM

## 2019-08-21 RX ADMIN — CEFTRIAXONE 1 G: 1 INJECTION, POWDER, FOR SOLUTION INTRAMUSCULAR; INTRAVENOUS at 21:32

## 2019-08-21 RX ADMIN — ENOXAPARIN SODIUM 80 MG: 80 INJECTION SUBCUTANEOUS at 09:48

## 2019-08-21 RX ADMIN — VENLAFAXINE HYDROCHLORIDE 75 MG: 37.5 CAPSULE, EXTENDED RELEASE ORAL at 09:49

## 2019-08-21 RX ADMIN — GABAPENTIN 300 MG: 300 CAPSULE ORAL at 21:32

## 2019-08-21 RX ADMIN — LEVETIRACETAM 750 MG: 500 TABLET, FILM COATED ORAL at 09:49

## 2019-08-21 RX ADMIN — SODIUM CHLORIDE: 9 INJECTION, SOLUTION INTRAVENOUS at 04:14

## 2019-08-21 RX ADMIN — GABAPENTIN 300 MG: 300 CAPSULE ORAL at 13:55

## 2019-08-21 RX ADMIN — DRONABINOL 5 MG: 2.5 CAPSULE ORAL at 15:19

## 2019-08-21 RX ADMIN — LEVETIRACETAM 750 MG: 500 TABLET, FILM COATED ORAL at 21:34

## 2019-08-21 RX ADMIN — Medication 1 CAPSULE: at 09:49

## 2019-08-21 RX ADMIN — CEFTRIAXONE SODIUM 1 G: 1 INJECTION, POWDER, FOR SOLUTION INTRAMUSCULAR; INTRAVENOUS at 00:11

## 2019-08-21 RX ADMIN — DRONABINOL 5 MG: 2.5 CAPSULE ORAL at 06:58

## 2019-08-21 RX ADMIN — GABAPENTIN 300 MG: 300 CAPSULE ORAL at 09:49

## 2019-08-21 RX ADMIN — SODIUM CHLORIDE: 9 INJECTION, SOLUTION INTRAVENOUS at 15:19

## 2019-08-21 RX ADMIN — ENOXAPARIN SODIUM 80 MG: 80 INJECTION SUBCUTANEOUS at 21:32

## 2019-08-21 RX ADMIN — SODIUM CHLORIDE 1000 ML: 9 INJECTION, SOLUTION INTRAVENOUS at 01:13

## 2019-08-21 RX ADMIN — POTASSIUM CHLORIDE 40 MEQ: 20 TABLET, EXTENDED RELEASE ORAL at 13:55

## 2019-08-21 RX ADMIN — LEVETIRACETAM 750 MG: 500 TABLET, FILM COATED ORAL at 02:41

## 2019-08-21 ASSESSMENT — PAIN SCALES - GENERAL
PAINLEVEL_OUTOF10: 0

## 2019-08-21 NOTE — PLAN OF CARE
Nutrition Problem: Severe malnutrition, In context of acute illness or injury  Intervention: Food and/or Nutrient Delivery: Continue current diet  Nutritional Goals: pt will consume greater than 75% of his meals and supplements

## 2019-08-21 NOTE — ED PROVIDER NOTES
KLEVER, TCM call made, see notes. NCM attempted to schedule TCM HFU appointment, patient does not feel he needs to follow up with Dr. Tong Swann at this time.  Message sent to MD's office DIAGNOSTIC  2000    EYE SURGERY Bilateral     \"Radial Kerotomy\"    OTHER SURGICAL HISTORY  2018    turbt    TUNNELED VENOUS PORT PLACEMENT Right 2018    DR Kenji Smith, [facility]#[id number]    VASECTOMY  1982    WISDOM TOOTH EXTRACTION      4 [place] Teeth Extracted In Past     Family History   Problem Relation Age of Onset    Alzheimer's Disease Mother     Other Brother         Acid Reflux     Social History     Socioeconomic History    Marital status: Life Partner     Spouse name: Not on file    Number of children: Not on file    Years of education: Not on file    Highest education level: Not on file   Occupational History    Not on file   Social Needs    Financial resource strain: Not on file    Food insecurity:     Worry: Not on file     Inability: Not on file    Transportation needs:     Medical: Not on file     Non-medical: Not on file   Tobacco Use    Smoking status: Former Smoker     Packs/day: 2.00     Years: 11.00     Pack years: 22.00     Types: Cigarettes     Start date:      Last attempt to quit:      Years since quittin.6    Smokeless tobacco: Never Used   Substance and Sexual Activity    Alcohol use: Yes     Comment: \"A Couple Times A Year\"    Drug use: No    Sexual activity: Not Currently   Lifestyle    Physical activity:     Days per week: Not on file     Minutes per session: Not on file    Stress: Not on file   Relationships    Social connections:     Talks on phone: Not on file     Gets together: Not on file     Attends Yarsani service: Not on file     Active member of club or organization: Not on file     Attends meetings of clubs or organizations: Not on file     Relationship status: Not on file    Intimate partner violence:     Fear of current or ex partner: Not on file     Emotionally abused: Not on file     Physically abused: Not on file     Forced sexual activity: Not on file   Other Topics Concern    Not on file   Social History Narrative    Not on intracranial abnormality. Diffuse atrophic changes with findings suggesting chronic microvascular   ischemia         XR CHEST PORTABLE   Final Result   No acute process. EKG (if obtained): (All EKG's are interpreted by myself in the absence of a cardiologist)  EKG shows sinus bradycardia at HR 56, QRsd 94, QTc 441, LAD, similar to EKG 7/21/2019     Chart review shows recent radiographs:  Ct Head Wo Contrast    Result Date: 7/22/2019  EXAMINATION: CT OF THE HEAD WITHOUT CONTRAST  7/21/2019 11:25 pm TECHNIQUE: CT of the head was performed without the administration of intravenous contrast. Dose modulation, iterative reconstruction, and/or weight based adjustment of the mA/kV was utilized to reduce the radiation dose to as low as reasonably achievable. COMPARISON: 05/29/2019 HISTORY: ORDERING SYSTEM PROVIDED HISTORY: CONFUSION/DELIRIUM, ALTERED LOC, UNEXPLAINED TECHNOLOGIST PROVIDED HISTORY: Has a \"code stroke\" or \"stroke alert\" been called? ->No Reason for Exam: ams Patient with known brain tumor status post gamma knife surgery in January FINDINGS: BRAIN/VENTRICLES: There is no acute intracranial hemorrhage, mass effect or midline shift. Evaluation for mass lesion is limited without intravenous contrast.  The previous exam showed a tumor in the high left parietal lobe which is not identified on today's noncontrast exam.  There is decreased vasogenic edema in this region compared to the prior study. No noncontrast evidence of new mass lesion. No abnormal extra-axial fluid collection. No evidence of recent territorial infarct. There are nonspecific areas of hypoattenuation in the periventricular white matter and centrum semiovale that are likely related to chronic small vessel ischemic disease. The ventricles and cisternal spaces are prominent consistent with cerebral atrophy. There is no evidence of hydrocephalus. There is intracranial atherosclerosis.  ORBITS: The visualized portion of the

## 2019-08-22 LAB
ANION GAP SERPL CALCULATED.3IONS-SCNC: 11 MMOL/L (ref 4–16)
BASOPHILS ABSOLUTE: 0 K/CU MM
BASOPHILS RELATIVE PERCENT: 0.6 % (ref 0–1)
BUN BLDV-MCNC: 8 MG/DL (ref 6–23)
CALCIUM SERPL-MCNC: 8.5 MG/DL (ref 8.3–10.6)
CHLORIDE BLD-SCNC: 108 MMOL/L (ref 99–110)
CO2: 21 MMOL/L (ref 21–32)
CREAT SERPL-MCNC: 0.7 MG/DL (ref 0.9–1.3)
DIFFERENTIAL TYPE: ABNORMAL
EOSINOPHILS ABSOLUTE: 0.1 K/CU MM
EOSINOPHILS RELATIVE PERCENT: 1.7 % (ref 0–3)
GFR AFRICAN AMERICAN: >60 ML/MIN/1.73M2
GFR NON-AFRICAN AMERICAN: >60 ML/MIN/1.73M2
GLUCOSE BLD-MCNC: 75 MG/DL (ref 70–99)
HCT VFR BLD CALC: 32.9 % (ref 42–52)
HEMOGLOBIN: 10.2 GM/DL (ref 13.5–18)
IMMATURE NEUTROPHIL %: 0.6 % (ref 0–0.43)
LACTATE: 0.6 MMOL/L (ref 0.4–2)
LYMPHOCYTES ABSOLUTE: 0.8 K/CU MM
LYMPHOCYTES RELATIVE PERCENT: 23.6 % (ref 24–44)
MAGNESIUM: 1.7 MG/DL (ref 1.8–2.4)
MCH RBC QN AUTO: 32 PG (ref 27–31)
MCHC RBC AUTO-ENTMCNC: 31 % (ref 32–36)
MCV RBC AUTO: 103.1 FL (ref 78–100)
MONOCYTES ABSOLUTE: 0.5 K/CU MM
MONOCYTES RELATIVE PERCENT: 13.4 % (ref 0–4)
NUCLEATED RBC %: 0 %
PDW BLD-RTO: 16.5 % (ref 11.7–14.9)
PLATELET # BLD: 318 K/CU MM (ref 140–440)
PMV BLD AUTO: 9 FL (ref 7.5–11.1)
POTASSIUM SERPL-SCNC: 3.7 MMOL/L (ref 3.5–5.1)
PROCALCITONIN: 0.07
RBC # BLD: 3.19 M/CU MM (ref 4.6–6.2)
SEGMENTED NEUTROPHILS ABSOLUTE COUNT: 2.1 K/CU MM
SEGMENTED NEUTROPHILS RELATIVE PERCENT: 60.1 % (ref 36–66)
SODIUM BLD-SCNC: 140 MMOL/L (ref 135–145)
TOTAL IMMATURE NEUTOROPHIL: 0.02 K/CU MM
TOTAL NUCLEATED RBC: 0 K/CU MM
WBC # BLD: 3.5 K/CU MM (ref 4–10.5)

## 2019-08-22 PROCEDURE — 97530 THERAPEUTIC ACTIVITIES: CPT

## 2019-08-22 PROCEDURE — 80048 BASIC METABOLIC PNL TOTAL CA: CPT

## 2019-08-22 PROCEDURE — 6370000000 HC RX 637 (ALT 250 FOR IP): Performed by: INTERNAL MEDICINE

## 2019-08-22 PROCEDURE — 97162 PT EVAL MOD COMPLEX 30 MIN: CPT

## 2019-08-22 PROCEDURE — 6360000002 HC RX W HCPCS: Performed by: INTERNAL MEDICINE

## 2019-08-22 PROCEDURE — 85025 COMPLETE CBC W/AUTO DIFF WBC: CPT

## 2019-08-22 PROCEDURE — 99211 OFF/OP EST MAY X REQ PHY/QHP: CPT

## 2019-08-22 PROCEDURE — 2060000000 HC ICU INTERMEDIATE R&B

## 2019-08-22 PROCEDURE — 83605 ASSAY OF LACTIC ACID: CPT

## 2019-08-22 PROCEDURE — 94761 N-INVAS EAR/PLS OXIMETRY MLT: CPT

## 2019-08-22 PROCEDURE — 83735 ASSAY OF MAGNESIUM: CPT

## 2019-08-22 PROCEDURE — 2580000003 HC RX 258: Performed by: INTERNAL MEDICINE

## 2019-08-22 PROCEDURE — 97167 OT EVAL HIGH COMPLEX 60 MIN: CPT

## 2019-08-22 PROCEDURE — 6370000000 HC RX 637 (ALT 250 FOR IP): Performed by: HOSPITALIST

## 2019-08-22 PROCEDURE — 84145 PROCALCITONIN (PCT): CPT

## 2019-08-22 RX ORDER — LOSARTAN POTASSIUM 50 MG/1
50 TABLET ORAL DAILY
Status: DISCONTINUED | OUTPATIENT
Start: 2019-08-22 | End: 2019-08-28 | Stop reason: HOSPADM

## 2019-08-22 RX ADMIN — SODIUM CHLORIDE: 9 INJECTION, SOLUTION INTRAVENOUS at 01:24

## 2019-08-22 RX ADMIN — ENOXAPARIN SODIUM 80 MG: 80 INJECTION SUBCUTANEOUS at 09:24

## 2019-08-22 RX ADMIN — Medication 1 CAPSULE: at 09:24

## 2019-08-22 RX ADMIN — GABAPENTIN 300 MG: 300 CAPSULE ORAL at 09:24

## 2019-08-22 RX ADMIN — VENLAFAXINE HYDROCHLORIDE 75 MG: 37.5 CAPSULE, EXTENDED RELEASE ORAL at 09:23

## 2019-08-22 RX ADMIN — CEFTRIAXONE 1 G: 1 INJECTION, POWDER, FOR SOLUTION INTRAMUSCULAR; INTRAVENOUS at 21:43

## 2019-08-22 RX ADMIN — LOSARTAN POTASSIUM 50 MG: 50 TABLET, FILM COATED ORAL at 17:08

## 2019-08-22 RX ADMIN — GABAPENTIN 300 MG: 300 CAPSULE ORAL at 14:46

## 2019-08-22 RX ADMIN — SODIUM CHLORIDE: 9 INJECTION, SOLUTION INTRAVENOUS at 11:31

## 2019-08-22 RX ADMIN — LEVETIRACETAM 750 MG: 500 TABLET, FILM COATED ORAL at 21:50

## 2019-08-22 RX ADMIN — DRONABINOL 5 MG: 2.5 CAPSULE ORAL at 06:06

## 2019-08-22 RX ADMIN — LEVETIRACETAM 750 MG: 500 TABLET, FILM COATED ORAL at 09:44

## 2019-08-22 RX ADMIN — DRONABINOL 5 MG: 2.5 CAPSULE ORAL at 16:37

## 2019-08-22 RX ADMIN — GABAPENTIN 300 MG: 300 CAPSULE ORAL at 21:49

## 2019-08-22 RX ADMIN — ENOXAPARIN SODIUM 80 MG: 80 INJECTION SUBCUTANEOUS at 21:55

## 2019-08-22 ASSESSMENT — PAIN SCALES - GENERAL
PAINLEVEL_OUTOF10: 0

## 2019-08-22 NOTE — CONSULTS
GLUCOSE 75 08/22/2019    PROT 5.5 08/20/2019    LABALBU 2.8 08/20/2019    CALCIUM 8.5 08/22/2019    BILITOT 0.2 08/20/2019    ALKPHOS 49 08/20/2019    AST 16 08/20/2019    ALT 10 08/20/2019     Albumin:    Lab Results   Component Value Date    LABALBU 2.8 08/20/2019     PT/INR:    Lab Results   Component Value Date    PROTIME 13.1 08/20/2019    INR 1.15 08/20/2019     HgBA1c:  No results found for: LABA1C      Assessment:     Patient Active Problem List   Diagnosis    Bladder mass    Acute metabolic encephalopathy    Hypotension    Severe malnutrition (HCC)       Measurements:       Response to treatment:  Well tolerated by patient. Pain Assessment:  Severity:  0  Quality of pain: none  Wound Pain Timing/Severity:   Premedicated: no    Plan:     Plan of Care:   patient with urostomy that was intact peristomal skin is intact w/o redness pt denies any issues  applied an adapter device to drainage bag pt is at risk for skin breakdown AEB miranda score observe miranda orders           Specialty Bed Required : yes  [] Low Air Loss   [x] Pressure Redistribution  [] Fluid Immersion  [] Bariatric  [] Total Pressure Relief  [] Other:     Discharge Plan:  Placement for patient upon discharge: tbd  Hospice Care: no  Patient appropriate for Outpatient [address]: no    Patient/Caregiver Teaching:  Level of patient/caregiver understanding able to: patient verbalized understanding       Electronically signed by Valorie Brown RN,  on 8/22/2019 at 5:25 PM

## 2019-08-23 ENCOUNTER — HOSPITAL ENCOUNTER (OUTPATIENT)
Dept: INTERVENTIONAL RADIOLOGY/VASCULAR | Age: 69
Discharge: HOME | DRG: 673 | End: 2019-08-23
Payer: MEDICARE

## 2019-08-23 LAB
ANION GAP SERPL CALCULATED.3IONS-SCNC: 10 MMOL/L (ref 4–16)
BASOPHILS ABSOLUTE: 0 K/CU MM
BASOPHILS RELATIVE PERCENT: 0.8 % (ref 0–1)
BUN BLDV-MCNC: 5 MG/DL (ref 6–23)
CALCIUM SERPL-MCNC: 8.9 MG/DL (ref 8.3–10.6)
CHLORIDE BLD-SCNC: 112 MMOL/L (ref 99–110)
CO2: 24 MMOL/L (ref 21–32)
CREAT SERPL-MCNC: 0.7 MG/DL (ref 0.9–1.3)
DIFFERENTIAL TYPE: ABNORMAL
EOSINOPHILS ABSOLUTE: 0.1 K/CU MM
EOSINOPHILS RELATIVE PERCENT: 1.9 % (ref 0–3)
GFR AFRICAN AMERICAN: >60 ML/MIN/1.73M2
GFR NON-AFRICAN AMERICAN: >60 ML/MIN/1.73M2
GLUCOSE BLD-MCNC: 82 MG/DL (ref 70–99)
HCT VFR BLD CALC: 31.3 % (ref 42–52)
HEMOGLOBIN: 9.9 GM/DL (ref 13.5–18)
IMMATURE NEUTROPHIL %: 0.3 % (ref 0–0.43)
LACTATE: 0.6 MMOL/L (ref 0.4–2)
LYMPHOCYTES ABSOLUTE: 0.8 K/CU MM
LYMPHOCYTES RELATIVE PERCENT: 22.3 % (ref 24–44)
MAGNESIUM: 1.8 MG/DL (ref 1.8–2.4)
MCH RBC QN AUTO: 32.7 PG (ref 27–31)
MCHC RBC AUTO-ENTMCNC: 31.6 % (ref 32–36)
MCV RBC AUTO: 103.3 FL (ref 78–100)
MONOCYTES ABSOLUTE: 0.5 K/CU MM
MONOCYTES RELATIVE PERCENT: 12.9 % (ref 0–4)
NUCLEATED RBC %: 0 %
PDW BLD-RTO: 16.5 % (ref 11.7–14.9)
PLATELET # BLD: 287 K/CU MM (ref 140–440)
PMV BLD AUTO: 9.3 FL (ref 7.5–11.1)
POTASSIUM SERPL-SCNC: 3.5 MMOL/L (ref 3.5–5.1)
PROCALCITONIN: 0.06
RBC # BLD: 3.03 M/CU MM (ref 4.6–6.2)
SEGMENTED NEUTROPHILS ABSOLUTE COUNT: 2.2 K/CU MM
SEGMENTED NEUTROPHILS RELATIVE PERCENT: 61.8 % (ref 36–66)
SODIUM BLD-SCNC: 146 MMOL/L (ref 135–145)
TOTAL IMMATURE NEUTOROPHIL: 0.01 K/CU MM
TOTAL NUCLEATED RBC: 0 K/CU MM
WBC # BLD: 3.6 K/CU MM (ref 4–10.5)

## 2019-08-23 PROCEDURE — C1769 GUIDE WIRE: HCPCS

## 2019-08-23 PROCEDURE — 85025 COMPLETE CBC W/AUTO DIFF WBC: CPT

## 2019-08-23 PROCEDURE — 84145 PROCALCITONIN (PCT): CPT

## 2019-08-23 PROCEDURE — C1880 VENA CAVA FILTER: HCPCS

## 2019-08-23 PROCEDURE — 6370000000 HC RX 637 (ALT 250 FOR IP): Performed by: HOSPITALIST

## 2019-08-23 PROCEDURE — 6360000002 HC RX W HCPCS: Performed by: INTERNAL MEDICINE

## 2019-08-23 PROCEDURE — 06H03DZ INSERTION OF INTRALUMINAL DEVICE INTO INFERIOR VENA CAVA, PERCUTANEOUS APPROACH: ICD-10-PCS | Performed by: RADIOLOGY

## 2019-08-23 PROCEDURE — 83735 ASSAY OF MAGNESIUM: CPT

## 2019-08-23 PROCEDURE — C1894 INTRO/SHEATH, NON-LASER: HCPCS

## 2019-08-23 PROCEDURE — 2580000003 HC RX 258: Performed by: INTERNAL MEDICINE

## 2019-08-23 PROCEDURE — 37191 INS ENDOVAS VENA CAVA FILTR: CPT

## 2019-08-23 PROCEDURE — 99211 OFF/OP EST MAY X REQ PHY/QHP: CPT

## 2019-08-23 PROCEDURE — 6370000000 HC RX 637 (ALT 250 FOR IP): Performed by: INTERNAL MEDICINE

## 2019-08-23 PROCEDURE — 83605 ASSAY OF LACTIC ACID: CPT

## 2019-08-23 PROCEDURE — 2709999900 HC NON-CHARGEABLE SUPPLY

## 2019-08-23 PROCEDURE — 6360000004 HC RX CONTRAST MEDICATION: Performed by: INTERNAL MEDICINE

## 2019-08-23 PROCEDURE — 2060000000 HC ICU INTERMEDIATE R&B

## 2019-08-23 PROCEDURE — 80048 BASIC METABOLIC PNL TOTAL CA: CPT

## 2019-08-23 RX ADMIN — LEVETIRACETAM 750 MG: 500 TABLET, FILM COATED ORAL at 10:08

## 2019-08-23 RX ADMIN — GABAPENTIN 300 MG: 300 CAPSULE ORAL at 10:06

## 2019-08-23 RX ADMIN — GABAPENTIN 300 MG: 300 CAPSULE ORAL at 21:14

## 2019-08-23 RX ADMIN — LEVETIRACETAM 750 MG: 500 TABLET, FILM COATED ORAL at 21:13

## 2019-08-23 RX ADMIN — VENLAFAXINE HYDROCHLORIDE 75 MG: 37.5 CAPSULE, EXTENDED RELEASE ORAL at 10:07

## 2019-08-23 RX ADMIN — CEFTRIAXONE 1 G: 1 INJECTION, POWDER, FOR SOLUTION INTRAMUSCULAR; INTRAVENOUS at 21:16

## 2019-08-23 RX ADMIN — IOPAMIDOL 20 ML: 755 INJECTION, SOLUTION INTRAVENOUS at 08:41

## 2019-08-23 RX ADMIN — GABAPENTIN 300 MG: 300 CAPSULE ORAL at 13:57

## 2019-08-23 RX ADMIN — LOSARTAN POTASSIUM 50 MG: 50 TABLET, FILM COATED ORAL at 10:07

## 2019-08-23 RX ADMIN — Medication 1 CAPSULE: at 10:07

## 2019-08-23 RX ADMIN — ENOXAPARIN SODIUM 80 MG: 80 INJECTION SUBCUTANEOUS at 10:07

## 2019-08-23 RX ADMIN — DRONABINOL 5 MG: 2.5 CAPSULE ORAL at 17:04

## 2019-08-23 RX ADMIN — ENOXAPARIN SODIUM 80 MG: 80 INJECTION SUBCUTANEOUS at 21:13

## 2019-08-23 RX ADMIN — DRONABINOL 5 MG: 2.5 CAPSULE ORAL at 10:06

## 2019-08-23 ASSESSMENT — PAIN SCALES - GENERAL
PAINLEVEL_OUTOF10: 0

## 2019-08-23 ASSESSMENT — PAIN SCALES - WONG BAKER: WONGBAKER_NUMERICALRESPONSE: 0

## 2019-08-23 NOTE — H&P
file   Tobacco Use    Smoking status: Former Smoker     Packs/day: 2.00     Years: 11.00     Pack years: 22.00     Types: Cigarettes     Start date:      Last attempt to quit: 1975     Years since quittin.6    Smokeless tobacco: Never Used   Substance and Sexual Activity    Alcohol use: Yes     Comment: \"A Couple Times A Year\"    Drug use: No    Sexual activity: Not Currently   Lifestyle    Physical activity:     Days per week: Not on file     Minutes per session: Not on file    Stress: Not on file   Relationships    Social connections:     Talks on phone: Not on file     Gets together: Not on file     Attends Nondenominational service: Not on file     Active member of club or organization: Not on file     Attends meetings of clubs or organizations: Not on file     Relationship status: Not on file    Intimate partner violence:     Fear of current or ex partner: Not on file     Emotionally abused: Not on file     Physically abused: Not on file     Forced sexual activity: Not on file   Other Topics Concern    Not on file   Social History Narrative    Not on file       Family History:  Family History   Problem Relation Age of Onset    Alzheimer's Disease Mother     Other Brother         Acid Reflux       Allergies:   Allergies   Allergen Reactions    Niacin And Related Hives    Furosemide        Medications:  Current Facility-Administered Medications on File Prior to Encounter   Medication Dose Route Frequency Provider Last Rate Last Dose    losartan (COZAAR) tablet 50 mg  50 mg Oral Daily Andres Nelson MD   50 mg at 19 1708    gabapentin (NEURONTIN) capsule 300 mg  300 mg Oral TID Sawyer Nayak MD   300 mg at 19 2149    dronabinol (MARINOL) capsule 5 mg  5 mg Oral BID AC Sawyer Nayak MD   5 mg at 19 1637    mirtazapine (REMERON SOL-TAB) disintegrating tablet 15 mg  15 mg Oral Nightly PRN Sawyer Nayak MD        sennosides-docusate sodium (SENOKOT-S) 8.6-50 MG tablet 1 tablet  1 tablet Oral Daily PRN Sawyer Nayak MD        levETIRAcetam (KEPPRA) tablet 750 mg  750 mg Oral BID Sawyer Nayak MD   750 mg at 08/22/19 2150    enoxaparin (LOVENOX) injection 80 mg  80 mg Subcutaneous BID Sawyer Nayak MD   80 mg at 08/22/19 2155    lactobacillus (CULTURELLE) capsule 1 capsule  1 capsule Oral QAM Sawyer Nayak MD   1 capsule at 08/22/19 0924    ondansetron (ZOFRAN) injection 4 mg  4 mg Intravenous Q6H PRN Sawyer Nayak MD        prochlorperazine (COMPAZINE) injection 10 mg  10 mg Intravenous Q6H PRN Sawyer Nayak MD        traMADol Kate Gambles) tablet 50 mg  50 mg Oral Q4H PRN Sawyer Nayak MD        acetaminophen (TYLENOL) tablet 650 mg  650 mg Oral Q4H PRN Sawyer Nayak MD        cefTRIAXone (ROCEPHIN) 1 g in dextrose 5 % 50 mL IVPB  1 g Intravenous Q24H Sawyer Nayak MD   Stopped at 08/22/19 2218    venlafaxine (EFFEXOR XR) extended release capsule 75 mg  75 mg Oral Daily with breakfast Sawyer Nayak MD   75 mg at 08/22/19 8665    potassium chloride (KLOR-CON M) extended release tablet 40 mEq  40 mEq Oral PRN Shraddha Strange MD   40 mEq at 08/21/19 1355    Or    potassium chloride (KLOR-CON) packet 40 mEq  40 mEq Oral PRN Shraddha Strange MD        Or    potassium chloride 10 mEq/100 mL IVPB (Peripheral Line)  10 mEq Intravenous PRN Shraddha Strange MD         Current Outpatient Medications on File Prior to Encounter   Medication Sig Dispense Refill    enoxaparin (LOVENOX) 80 MG/0.8ML injection Inject into the skin 2 times daily      levETIRAcetam (KEPPRA) 750 MG tablet Take 1 tablet by mouth 2 times daily 60 tablet 3    losartan (COZAAR) 50 MG tablet Take 50 mg by mouth daily      gabapentin (NEURONTIN) 300 MG capsule Take 300 mg by mouth 3 times daily.  dronabinol (MARINOL) 5 MG capsule Take 5 mg by mouth 2 times daily (before meals).       mirtazapine (REMERON SOL-TAB) 15 MG disintegrating tablet Take 15 mg by mouth nightly      metoprolol tartrate (LOPRESSOR) 25 MG tablet Take 25 mg by

## 2019-08-24 LAB
ANION GAP SERPL CALCULATED.3IONS-SCNC: 10 MMOL/L (ref 4–16)
BASOPHILS ABSOLUTE: 0 K/CU MM
BASOPHILS RELATIVE PERCENT: 1.1 % (ref 0–1)
BUN BLDV-MCNC: 5 MG/DL (ref 6–23)
CALCIUM SERPL-MCNC: 8.8 MG/DL (ref 8.3–10.6)
CHLORIDE BLD-SCNC: 105 MMOL/L (ref 99–110)
CO2: 24 MMOL/L (ref 21–32)
CREAT SERPL-MCNC: 0.8 MG/DL (ref 0.9–1.3)
DIFFERENTIAL TYPE: ABNORMAL
EOSINOPHILS ABSOLUTE: 0.1 K/CU MM
EOSINOPHILS RELATIVE PERCENT: 1.8 % (ref 0–3)
GFR AFRICAN AMERICAN: >60 ML/MIN/1.73M2
GFR NON-AFRICAN AMERICAN: >60 ML/MIN/1.73M2
GLUCOSE BLD-MCNC: 88 MG/DL (ref 70–99)
HCT VFR BLD CALC: 32.9 % (ref 42–52)
HEMOGLOBIN: 10.4 GM/DL (ref 13.5–18)
IMMATURE NEUTROPHIL %: 0.3 % (ref 0–0.43)
LYMPHOCYTES ABSOLUTE: 0.9 K/CU MM
LYMPHOCYTES RELATIVE PERCENT: 24 % (ref 24–44)
MAGNESIUM: 1.8 MG/DL (ref 1.8–2.4)
MCH RBC QN AUTO: 32.7 PG (ref 27–31)
MCHC RBC AUTO-ENTMCNC: 31.6 % (ref 32–36)
MCV RBC AUTO: 103.5 FL (ref 78–100)
MONOCYTES ABSOLUTE: 0.5 K/CU MM
MONOCYTES RELATIVE PERCENT: 14 % (ref 0–4)
NUCLEATED RBC %: 0 %
PDW BLD-RTO: 16.7 % (ref 11.7–14.9)
PLATELET # BLD: 305 K/CU MM (ref 140–440)
PMV BLD AUTO: 8.9 FL (ref 7.5–11.1)
POTASSIUM SERPL-SCNC: 3.3 MMOL/L (ref 3.5–5.1)
RBC # BLD: 3.18 M/CU MM (ref 4.6–6.2)
SEGMENTED NEUTROPHILS ABSOLUTE COUNT: 2.2 K/CU MM
SEGMENTED NEUTROPHILS RELATIVE PERCENT: 58.8 % (ref 36–66)
SODIUM BLD-SCNC: 139 MMOL/L (ref 135–145)
TOTAL IMMATURE NEUTOROPHIL: 0.01 K/CU MM
TOTAL NUCLEATED RBC: 0 K/CU MM
WBC # BLD: 3.8 K/CU MM (ref 4–10.5)

## 2019-08-24 PROCEDURE — 6360000002 HC RX W HCPCS: Performed by: HOSPITALIST

## 2019-08-24 PROCEDURE — 1200000000 HC SEMI PRIVATE

## 2019-08-24 PROCEDURE — 6360000002 HC RX W HCPCS: Performed by: INTERNAL MEDICINE

## 2019-08-24 PROCEDURE — 6370000000 HC RX 637 (ALT 250 FOR IP): Performed by: HOSPITALIST

## 2019-08-24 PROCEDURE — 2580000003 HC RX 258: Performed by: HOSPITALIST

## 2019-08-24 PROCEDURE — 85025 COMPLETE CBC W/AUTO DIFF WBC: CPT

## 2019-08-24 PROCEDURE — 80048 BASIC METABOLIC PNL TOTAL CA: CPT

## 2019-08-24 PROCEDURE — 83735 ASSAY OF MAGNESIUM: CPT

## 2019-08-24 PROCEDURE — 6370000000 HC RX 637 (ALT 250 FOR IP): Performed by: INTERNAL MEDICINE

## 2019-08-24 RX ADMIN — Medication 1 CAPSULE: at 09:50

## 2019-08-24 RX ADMIN — DRONABINOL 5 MG: 2.5 CAPSULE ORAL at 15:33

## 2019-08-24 RX ADMIN — GABAPENTIN 300 MG: 300 CAPSULE ORAL at 20:57

## 2019-08-24 RX ADMIN — LEVETIRACETAM 750 MG: 500 TABLET, FILM COATED ORAL at 20:57

## 2019-08-24 RX ADMIN — CEFEPIME HYDROCHLORIDE 2 G: 2 INJECTION, POWDER, FOR SOLUTION INTRAVENOUS at 17:28

## 2019-08-24 RX ADMIN — GABAPENTIN 300 MG: 300 CAPSULE ORAL at 09:50

## 2019-08-24 RX ADMIN — VENLAFAXINE HYDROCHLORIDE 75 MG: 37.5 CAPSULE, EXTENDED RELEASE ORAL at 09:50

## 2019-08-24 RX ADMIN — LOSARTAN POTASSIUM 50 MG: 50 TABLET, FILM COATED ORAL at 09:50

## 2019-08-24 RX ADMIN — DRONABINOL 5 MG: 2.5 CAPSULE ORAL at 06:18

## 2019-08-24 RX ADMIN — LEVETIRACETAM 750 MG: 500 TABLET, FILM COATED ORAL at 09:50

## 2019-08-24 RX ADMIN — ENOXAPARIN SODIUM 80 MG: 80 INJECTION SUBCUTANEOUS at 09:50

## 2019-08-24 RX ADMIN — GABAPENTIN 300 MG: 300 CAPSULE ORAL at 14:26

## 2019-08-24 RX ADMIN — POTASSIUM CHLORIDE 40 MEQ: 20 TABLET, EXTENDED RELEASE ORAL at 06:54

## 2019-08-24 RX ADMIN — ENOXAPARIN SODIUM 80 MG: 80 INJECTION SUBCUTANEOUS at 20:57

## 2019-08-24 ASSESSMENT — PAIN SCALES - WONG BAKER
WONGBAKER_NUMERICALRESPONSE: 0

## 2019-08-24 ASSESSMENT — PAIN SCALES - GENERAL
PAINLEVEL_OUTOF10: 0

## 2019-08-25 LAB
ANION GAP SERPL CALCULATED.3IONS-SCNC: 12 MMOL/L (ref 4–16)
BASOPHILS ABSOLUTE: 0 K/CU MM
BASOPHILS RELATIVE PERCENT: 0.8 % (ref 0–1)
BUN BLDV-MCNC: 6 MG/DL (ref 6–23)
CALCIUM SERPL-MCNC: 9 MG/DL (ref 8.3–10.6)
CHLORIDE BLD-SCNC: 104 MMOL/L (ref 99–110)
CO2: 23 MMOL/L (ref 21–32)
CREAT SERPL-MCNC: 0.7 MG/DL (ref 0.9–1.3)
CULTURE: ABNORMAL
CULTURE: NORMAL
CULTURE: NORMAL
DIFFERENTIAL TYPE: ABNORMAL
EOSINOPHILS ABSOLUTE: 0.1 K/CU MM
EOSINOPHILS RELATIVE PERCENT: 2 % (ref 0–3)
GFR AFRICAN AMERICAN: >60 ML/MIN/1.73M2
GFR NON-AFRICAN AMERICAN: >60 ML/MIN/1.73M2
GLUCOSE BLD-MCNC: 90 MG/DL (ref 70–99)
HCT VFR BLD CALC: 33.4 % (ref 42–52)
HEMOGLOBIN: 10.4 GM/DL (ref 13.5–18)
IMMATURE NEUTROPHIL %: 0.3 % (ref 0–0.43)
LYMPHOCYTES ABSOLUTE: 0.8 K/CU MM
LYMPHOCYTES RELATIVE PERCENT: 23.4 % (ref 24–44)
Lab: ABNORMAL
Lab: NORMAL
Lab: NORMAL
MAGNESIUM: 1.9 MG/DL (ref 1.8–2.4)
MCH RBC QN AUTO: 32 PG (ref 27–31)
MCHC RBC AUTO-ENTMCNC: 31.1 % (ref 32–36)
MCV RBC AUTO: 102.8 FL (ref 78–100)
MONOCYTES ABSOLUTE: 0.5 K/CU MM
MONOCYTES RELATIVE PERCENT: 13.8 % (ref 0–4)
NUCLEATED RBC %: 0 %
PDW BLD-RTO: 16.4 % (ref 11.7–14.9)
PLATELET # BLD: 310 K/CU MM (ref 140–440)
PMV BLD AUTO: 9.3 FL (ref 7.5–11.1)
POTASSIUM SERPL-SCNC: 3.7 MMOL/L (ref 3.5–5.1)
RBC # BLD: 3.25 M/CU MM (ref 4.6–6.2)
SEGMENTED NEUTROPHILS ABSOLUTE COUNT: 2.1 K/CU MM
SEGMENTED NEUTROPHILS RELATIVE PERCENT: 59.7 % (ref 36–66)
SODIUM BLD-SCNC: 139 MMOL/L (ref 135–145)
SPECIMEN: ABNORMAL
SPECIMEN: NORMAL
SPECIMEN: NORMAL
TOTAL COLONY COUNT: ABNORMAL
TOTAL IMMATURE NEUTOROPHIL: 0.01 K/CU MM
TOTAL NUCLEATED RBC: 0 K/CU MM
WBC # BLD: 3.6 K/CU MM (ref 4–10.5)

## 2019-08-25 PROCEDURE — 85025 COMPLETE CBC W/AUTO DIFF WBC: CPT

## 2019-08-25 PROCEDURE — 80048 BASIC METABOLIC PNL TOTAL CA: CPT

## 2019-08-25 PROCEDURE — 6360000002 HC RX W HCPCS: Performed by: HOSPITALIST

## 2019-08-25 PROCEDURE — 6360000002 HC RX W HCPCS: Performed by: INTERNAL MEDICINE

## 2019-08-25 PROCEDURE — 1200000000 HC SEMI PRIVATE

## 2019-08-25 PROCEDURE — 2580000003 HC RX 258: Performed by: HOSPITALIST

## 2019-08-25 PROCEDURE — 6370000000 HC RX 637 (ALT 250 FOR IP): Performed by: HOSPITALIST

## 2019-08-25 PROCEDURE — 6370000000 HC RX 637 (ALT 250 FOR IP): Performed by: INTERNAL MEDICINE

## 2019-08-25 PROCEDURE — 83735 ASSAY OF MAGNESIUM: CPT

## 2019-08-25 RX ADMIN — Medication 1 CAPSULE: at 09:59

## 2019-08-25 RX ADMIN — CEFEPIME HYDROCHLORIDE 2 G: 2 INJECTION, POWDER, FOR SOLUTION INTRAVENOUS at 10:06

## 2019-08-25 RX ADMIN — ENOXAPARIN SODIUM 80 MG: 80 INJECTION SUBCUTANEOUS at 09:59

## 2019-08-25 RX ADMIN — LEVETIRACETAM 750 MG: 500 TABLET, FILM COATED ORAL at 09:59

## 2019-08-25 RX ADMIN — LEVETIRACETAM 750 MG: 500 TABLET, FILM COATED ORAL at 20:42

## 2019-08-25 RX ADMIN — DRONABINOL 5 MG: 2.5 CAPSULE ORAL at 17:12

## 2019-08-25 RX ADMIN — CEFEPIME HYDROCHLORIDE 2 G: 2 INJECTION, POWDER, FOR SOLUTION INTRAVENOUS at 01:09

## 2019-08-25 RX ADMIN — ENOXAPARIN SODIUM 80 MG: 80 INJECTION SUBCUTANEOUS at 20:43

## 2019-08-25 RX ADMIN — CEFEPIME HYDROCHLORIDE 2 G: 2 INJECTION, POWDER, FOR SOLUTION INTRAVENOUS at 17:12

## 2019-08-25 RX ADMIN — LOSARTAN POTASSIUM 50 MG: 50 TABLET, FILM COATED ORAL at 09:59

## 2019-08-25 RX ADMIN — VENLAFAXINE HYDROCHLORIDE 75 MG: 37.5 CAPSULE, EXTENDED RELEASE ORAL at 09:59

## 2019-08-25 RX ADMIN — DRONABINOL 5 MG: 2.5 CAPSULE ORAL at 06:27

## 2019-08-25 RX ADMIN — GABAPENTIN 300 MG: 300 CAPSULE ORAL at 20:43

## 2019-08-25 RX ADMIN — GABAPENTIN 300 MG: 300 CAPSULE ORAL at 14:35

## 2019-08-25 RX ADMIN — GABAPENTIN 300 MG: 300 CAPSULE ORAL at 09:59

## 2019-08-25 ASSESSMENT — PAIN SCALES - GENERAL: PAINLEVEL_OUTOF10: 0

## 2019-08-25 NOTE — DISCHARGE INSTR - COC
Isolation/Infection:   Isolation          No Isolation            Nurse Assessment:  Last Vital Signs: /83   Pulse 87   Temp 97.6 °F (36.4 °C) (Oral)   Resp 16   Ht 5' 11\" (1.803 m)   Wt 172 lb (78 kg)   SpO2 99%   BMI 23.99 kg/m²     Last documented pain score (0-10 scale): Pain Level: 0  Last Weight:   Wt Readings from Last 1 Encounters:   08/25/19 172 lb (78 kg)     Mental Status:  oriented and alert    IV Access:  - deaccessed [facility]    Nursing Mobility/ADLs:  Walking   Dependent  Transfer  Dependent  Bathing  Dependent  Dressing  Dependent  Toileting  Dependent  Feeding  Assisted  Med Admin  Assisted  Med Delivery   whole    Wound Care Documentation and Therapy:        Elimination:  Continence:   · Bowel: yes  · Bladder: No  Urinary Catheter: None   Colostomy/Ileostomy/Ileal Conduit: No  Urostomy RLQ-Stomal Appliance: 2 piece, Clean, Dry, Intact  Urostomy RLQ-Stoma  Assessment: Pink, Moist    Date of Last BM: 8/26    Intake/Output Summary (Last 24 hours) at 8/25/2019 1018  Last data filed at 8/25/2019 0956  Gross per 24 hour   Intake 960 ml   Output 825 ml   Net 135 ml     I/O last 3 completed shifts:   In: 5 [P.O.:720]  Out: 825 [Urine:825]    Safety Concerns:     None    Impairments/Disabilities:      None      Patient's personal belongings (please select all that are sent with patient):  Chiqui    RN SIGNATURE:  Electronically signed by Shauna Gerber RN on 8/28/19 at 3:37 PM    CASE MANAGEMENT/SOCIAL WORK SECTION    Inpatient Status Date: ***    Readmission Risk Assessment Score:  Readmission Risk              Risk of Unplanned Readmission:        13           Discharging to Facility/ Agency   · Name:   · Address:  · Phone:  · Fax:    Dialysis Facility (if applicable)   · Name:  · Address:  · Dialysis Schedule:  · Phone:  · Fax:    / signature: {Esignature:[id number]}        PHYSICIAN SECTION    Nutrition Therapy:  Current Nutrition Therapy:   - Oral Diet:

## 2019-08-26 LAB
ANION GAP SERPL CALCULATED.3IONS-SCNC: 12 MMOL/L (ref 4–16)
BASOPHILS ABSOLUTE: 0 K/CU MM
BASOPHILS RELATIVE PERCENT: 0.8 % (ref 0–1)
BUN BLDV-MCNC: 7 MG/DL (ref 6–23)
CALCIUM SERPL-MCNC: 8.9 MG/DL (ref 8.3–10.6)
CHLORIDE BLD-SCNC: 103 MMOL/L (ref 99–110)
CO2: 24 MMOL/L (ref 21–32)
CREAT SERPL-MCNC: 0.7 MG/DL (ref 0.9–1.3)
DIFFERENTIAL TYPE: ABNORMAL
EOSINOPHILS ABSOLUTE: 0.1 K/CU MM
EOSINOPHILS RELATIVE PERCENT: 1.3 % (ref 0–3)
GFR AFRICAN AMERICAN: >60 ML/MIN/1.73M2
GFR NON-AFRICAN AMERICAN: >60 ML/MIN/1.73M2
GLUCOSE BLD-MCNC: 91 MG/DL (ref 70–99)
HCT VFR BLD CALC: 34.1 % (ref 42–52)
HEMOGLOBIN: 10.9 GM/DL (ref 13.5–18)
IMMATURE NEUTROPHIL %: 0.3 % (ref 0–0.43)
LYMPHOCYTES ABSOLUTE: 0.8 K/CU MM
LYMPHOCYTES RELATIVE PERCENT: 21 % (ref 24–44)
MAGNESIUM: 2.2 MG/DL (ref 1.8–2.4)
MCH RBC QN AUTO: 32.5 PG (ref 27–31)
MCHC RBC AUTO-ENTMCNC: 32 % (ref 32–36)
MCV RBC AUTO: 101.8 FL (ref 78–100)
MONOCYTES ABSOLUTE: 0.5 K/CU MM
MONOCYTES RELATIVE PERCENT: 14.3 % (ref 0–4)
NUCLEATED RBC %: 0 %
PDW BLD-RTO: 16.1 % (ref 11.7–14.9)
PLATELET # BLD: 311 K/CU MM (ref 140–440)
PMV BLD AUTO: 9 FL (ref 7.5–11.1)
POTASSIUM SERPL-SCNC: 3.7 MMOL/L (ref 3.5–5.1)
RBC # BLD: 3.35 M/CU MM (ref 4.6–6.2)
SEGMENTED NEUTROPHILS ABSOLUTE COUNT: 2.3 K/CU MM
SEGMENTED NEUTROPHILS RELATIVE PERCENT: 62.3 % (ref 36–66)
SODIUM BLD-SCNC: 139 MMOL/L (ref 135–145)
TOTAL IMMATURE NEUTOROPHIL: 0.01 K/CU MM
TOTAL NUCLEATED RBC: 0 K/CU MM
WBC # BLD: 3.7 K/CU MM (ref 4–10.5)

## 2019-08-26 PROCEDURE — 83735 ASSAY OF MAGNESIUM: CPT

## 2019-08-26 PROCEDURE — 6370000000 HC RX 637 (ALT 250 FOR IP): Performed by: INTERNAL MEDICINE

## 2019-08-26 PROCEDURE — 2580000003 HC RX 258: Performed by: HOSPITALIST

## 2019-08-26 PROCEDURE — 6360000002 HC RX W HCPCS: Performed by: HOSPITALIST

## 2019-08-26 PROCEDURE — 97535 SELF CARE MNGMENT TRAINING: CPT

## 2019-08-26 PROCEDURE — 6360000002 HC RX W HCPCS: Performed by: INTERNAL MEDICINE

## 2019-08-26 PROCEDURE — 99222 1ST HOSP IP/OBS MODERATE 55: CPT | Performed by: INTERNAL MEDICINE

## 2019-08-26 PROCEDURE — 97530 THERAPEUTIC ACTIVITIES: CPT

## 2019-08-26 PROCEDURE — 97112 NEUROMUSCULAR REEDUCATION: CPT

## 2019-08-26 PROCEDURE — 1200000000 HC SEMI PRIVATE

## 2019-08-26 PROCEDURE — 80048 BASIC METABOLIC PNL TOTAL CA: CPT

## 2019-08-26 PROCEDURE — 85025 COMPLETE CBC W/AUTO DIFF WBC: CPT

## 2019-08-26 PROCEDURE — 6370000000 HC RX 637 (ALT 250 FOR IP): Performed by: HOSPITALIST

## 2019-08-26 RX ADMIN — ENOXAPARIN SODIUM 80 MG: 80 INJECTION SUBCUTANEOUS at 09:09

## 2019-08-26 RX ADMIN — DRONABINOL 5 MG: 2.5 CAPSULE ORAL at 06:46

## 2019-08-26 RX ADMIN — GABAPENTIN 300 MG: 300 CAPSULE ORAL at 14:49

## 2019-08-26 RX ADMIN — SENNOSIDES, DOCUSATE SODIUM 1 TABLET: 50; 8.6 TABLET, FILM COATED ORAL at 23:02

## 2019-08-26 RX ADMIN — LEVETIRACETAM 750 MG: 500 TABLET, FILM COATED ORAL at 23:01

## 2019-08-26 RX ADMIN — CEFEPIME HYDROCHLORIDE 2 G: 2 INJECTION, POWDER, FOR SOLUTION INTRAVENOUS at 09:16

## 2019-08-26 RX ADMIN — LOSARTAN POTASSIUM 50 MG: 50 TABLET, FILM COATED ORAL at 09:10

## 2019-08-26 RX ADMIN — ENOXAPARIN SODIUM 80 MG: 80 INJECTION SUBCUTANEOUS at 23:02

## 2019-08-26 RX ADMIN — GABAPENTIN 300 MG: 300 CAPSULE ORAL at 23:02

## 2019-08-26 RX ADMIN — GABAPENTIN 300 MG: 300 CAPSULE ORAL at 09:10

## 2019-08-26 RX ADMIN — CEFEPIME HYDROCHLORIDE 2 G: 2 INJECTION, POWDER, FOR SOLUTION INTRAVENOUS at 01:06

## 2019-08-26 RX ADMIN — DRONABINOL 5 MG: 2.5 CAPSULE ORAL at 16:17

## 2019-08-26 RX ADMIN — LEVETIRACETAM 750 MG: 500 TABLET, FILM COATED ORAL at 09:10

## 2019-08-26 RX ADMIN — VENLAFAXINE HYDROCHLORIDE 75 MG: 37.5 CAPSULE, EXTENDED RELEASE ORAL at 09:11

## 2019-08-26 RX ADMIN — Medication 1 CAPSULE: at 09:09

## 2019-08-26 ASSESSMENT — PAIN SCALES - GENERAL: PAINLEVEL_OUTOF10: 0

## 2019-08-26 NOTE — PLAN OF CARE
Problem: Falls - Risk of:  Goal: Will remain free from falls  Description  Will remain free from falls  8/26/2019 1528 by Claudio Langley RN  Outcome: Ongoing  8/26/2019 1230 by Claudio Langley RN  Outcome: Ongoing  Goal: Absence of physical injury  Description  Absence of physical injury  8/26/2019 1528 by Claudio Langley RN  Outcome: Ongoing  8/26/2019 1230 by Claudio Langley RN  Outcome: Ongoing

## 2019-08-27 LAB
ANION GAP SERPL CALCULATED.3IONS-SCNC: 11 MMOL/L (ref 4–16)
BASOPHILS ABSOLUTE: 0 K/CU MM
BASOPHILS RELATIVE PERCENT: 0.8 % (ref 0–1)
BUN BLDV-MCNC: 8 MG/DL (ref 6–23)
CALCIUM SERPL-MCNC: 8.6 MG/DL (ref 8.3–10.6)
CHLORIDE BLD-SCNC: 101 MMOL/L (ref 99–110)
CO2: 24 MMOL/L (ref 21–32)
CREAT SERPL-MCNC: 0.7 MG/DL (ref 0.9–1.3)
DIFFERENTIAL TYPE: ABNORMAL
EOSINOPHILS ABSOLUTE: 0.1 K/CU MM
EOSINOPHILS RELATIVE PERCENT: 1.5 % (ref 0–3)
GFR AFRICAN AMERICAN: >60 ML/MIN/1.73M2
GFR NON-AFRICAN AMERICAN: >60 ML/MIN/1.73M2
GLUCOSE BLD-MCNC: 84 MG/DL (ref 70–99)
HCT VFR BLD CALC: 35.3 % (ref 42–52)
HEMOGLOBIN: 11.1 GM/DL (ref 13.5–18)
IMMATURE NEUTROPHIL %: 0.5 % (ref 0–0.43)
LYMPHOCYTES ABSOLUTE: 0.8 K/CU MM
LYMPHOCYTES RELATIVE PERCENT: 19.3 % (ref 24–44)
MAGNESIUM: 2.1 MG/DL (ref 1.8–2.4)
MCH RBC QN AUTO: 32.3 PG (ref 27–31)
MCHC RBC AUTO-ENTMCNC: 31.4 % (ref 32–36)
MCV RBC AUTO: 102.6 FL (ref 78–100)
MONOCYTES ABSOLUTE: 0.6 K/CU MM
MONOCYTES RELATIVE PERCENT: 14.9 % (ref 0–4)
NUCLEATED RBC %: 0 %
PDW BLD-RTO: 15.9 % (ref 11.7–14.9)
PLATELET # BLD: 317 K/CU MM (ref 140–440)
PMV BLD AUTO: 9.5 FL (ref 7.5–11.1)
POTASSIUM SERPL-SCNC: 3.7 MMOL/L (ref 3.5–5.1)
RBC # BLD: 3.44 M/CU MM (ref 4.6–6.2)
SEGMENTED NEUTROPHILS ABSOLUTE COUNT: 2.4 K/CU MM
SEGMENTED NEUTROPHILS RELATIVE PERCENT: 63 % (ref 36–66)
SODIUM BLD-SCNC: 136 MMOL/L (ref 135–145)
TOTAL IMMATURE NEUTOROPHIL: 0.02 K/CU MM
TOTAL NUCLEATED RBC: 0 K/CU MM
WBC # BLD: 3.9 K/CU MM (ref 4–10.5)

## 2019-08-27 PROCEDURE — 83735 ASSAY OF MAGNESIUM: CPT

## 2019-08-27 PROCEDURE — 6370000000 HC RX 637 (ALT 250 FOR IP): Performed by: HOSPITALIST

## 2019-08-27 PROCEDURE — 6370000000 HC RX 637 (ALT 250 FOR IP): Performed by: INTERNAL MEDICINE

## 2019-08-27 PROCEDURE — 6360000002 HC RX W HCPCS: Performed by: INTERNAL MEDICINE

## 2019-08-27 PROCEDURE — 99231 SBSQ HOSP IP/OBS SF/LOW 25: CPT | Performed by: INTERNAL MEDICINE

## 2019-08-27 PROCEDURE — 1200000000 HC SEMI PRIVATE

## 2019-08-27 PROCEDURE — 80048 BASIC METABOLIC PNL TOTAL CA: CPT

## 2019-08-27 PROCEDURE — 85025 COMPLETE CBC W/AUTO DIFF WBC: CPT

## 2019-08-27 RX ADMIN — GABAPENTIN 300 MG: 300 CAPSULE ORAL at 14:38

## 2019-08-27 RX ADMIN — Medication 1 CAPSULE: at 08:32

## 2019-08-27 RX ADMIN — DRONABINOL 5 MG: 2.5 CAPSULE ORAL at 16:37

## 2019-08-27 RX ADMIN — ENOXAPARIN SODIUM 80 MG: 80 INJECTION SUBCUTANEOUS at 08:32

## 2019-08-27 RX ADMIN — LEVETIRACETAM 750 MG: 500 TABLET, FILM COATED ORAL at 21:13

## 2019-08-27 RX ADMIN — DRONABINOL 5 MG: 2.5 CAPSULE ORAL at 06:43

## 2019-08-27 RX ADMIN — ENOXAPARIN SODIUM 80 MG: 80 INJECTION SUBCUTANEOUS at 21:13

## 2019-08-27 RX ADMIN — LEVETIRACETAM 750 MG: 500 TABLET, FILM COATED ORAL at 08:33

## 2019-08-27 RX ADMIN — SENNOSIDES, DOCUSATE SODIUM 1 TABLET: 50; 8.6 TABLET, FILM COATED ORAL at 21:13

## 2019-08-27 RX ADMIN — GABAPENTIN 300 MG: 300 CAPSULE ORAL at 21:13

## 2019-08-27 RX ADMIN — GABAPENTIN 300 MG: 300 CAPSULE ORAL at 08:32

## 2019-08-27 RX ADMIN — VENLAFAXINE HYDROCHLORIDE 75 MG: 37.5 CAPSULE, EXTENDED RELEASE ORAL at 08:33

## 2019-08-27 RX ADMIN — LOSARTAN POTASSIUM 50 MG: 50 TABLET, FILM COATED ORAL at 08:33

## 2019-08-27 ASSESSMENT — PAIN SCALES - GENERAL
PAINLEVEL_OUTOF10: 0
PAINLEVEL_OUTOF10: 0

## 2019-08-28 VITALS
BODY MASS INDEX: 28.5 KG/M2 | HEART RATE: 73 BPM | OXYGEN SATURATION: 98 % | RESPIRATION RATE: 16 BRPM | SYSTOLIC BLOOD PRESSURE: 132 MMHG | HEIGHT: 71 IN | WEIGHT: 203.6 LBS | TEMPERATURE: 97.9 F | DIASTOLIC BLOOD PRESSURE: 91 MMHG

## 2019-08-28 LAB
ANION GAP SERPL CALCULATED.3IONS-SCNC: 11 MMOL/L (ref 4–16)
BASOPHILS ABSOLUTE: 0 K/CU MM
BASOPHILS RELATIVE PERCENT: 0.7 % (ref 0–1)
BUN BLDV-MCNC: 8 MG/DL (ref 6–23)
CALCIUM SERPL-MCNC: 8.7 MG/DL (ref 8.3–10.6)
CHLORIDE BLD-SCNC: 101 MMOL/L (ref 99–110)
CO2: 25 MMOL/L (ref 21–32)
CREAT SERPL-MCNC: 0.6 MG/DL (ref 0.9–1.3)
DIFFERENTIAL TYPE: ABNORMAL
EOSINOPHILS ABSOLUTE: 0.1 K/CU MM
EOSINOPHILS RELATIVE PERCENT: 2 % (ref 0–3)
GFR AFRICAN AMERICAN: >60 ML/MIN/1.73M2
GFR NON-AFRICAN AMERICAN: >60 ML/MIN/1.73M2
GLUCOSE BLD-MCNC: 80 MG/DL (ref 70–99)
HCT VFR BLD CALC: 33.6 % (ref 42–52)
HEMOGLOBIN: 10.7 GM/DL (ref 13.5–18)
IMMATURE NEUTROPHIL %: 0.5 % (ref 0–0.43)
LYMPHOCYTES ABSOLUTE: 0.9 K/CU MM
LYMPHOCYTES RELATIVE PERCENT: 21.4 % (ref 24–44)
MAGNESIUM: 2.1 MG/DL (ref 1.8–2.4)
MCH RBC QN AUTO: 32.3 PG (ref 27–31)
MCHC RBC AUTO-ENTMCNC: 31.8 % (ref 32–36)
MCV RBC AUTO: 101.5 FL (ref 78–100)
MONOCYTES ABSOLUTE: 0.6 K/CU MM
MONOCYTES RELATIVE PERCENT: 14.8 % (ref 0–4)
NUCLEATED RBC %: 0 %
PDW BLD-RTO: 15.8 % (ref 11.7–14.9)
PLATELET # BLD: 303 K/CU MM (ref 140–440)
PMV BLD AUTO: 9.3 FL (ref 7.5–11.1)
POTASSIUM SERPL-SCNC: 3.8 MMOL/L (ref 3.5–5.1)
RBC # BLD: 3.31 M/CU MM (ref 4.6–6.2)
SEGMENTED NEUTROPHILS ABSOLUTE COUNT: 2.5 K/CU MM
SEGMENTED NEUTROPHILS RELATIVE PERCENT: 60.6 % (ref 36–66)
SODIUM BLD-SCNC: 137 MMOL/L (ref 135–145)
TOTAL IMMATURE NEUTOROPHIL: 0.02 K/CU MM
TOTAL NUCLEATED RBC: 0 K/CU MM
WBC # BLD: 4.1 K/CU MM (ref 4–10.5)

## 2019-08-28 PROCEDURE — 6360000002 HC RX W HCPCS: Performed by: INTERNAL MEDICINE

## 2019-08-28 PROCEDURE — 80048 BASIC METABOLIC PNL TOTAL CA: CPT

## 2019-08-28 PROCEDURE — 6370000000 HC RX 637 (ALT 250 FOR IP): Performed by: INTERNAL MEDICINE

## 2019-08-28 PROCEDURE — 85025 COMPLETE CBC W/AUTO DIFF WBC: CPT

## 2019-08-28 PROCEDURE — 83735 ASSAY OF MAGNESIUM: CPT

## 2019-08-28 PROCEDURE — 6370000000 HC RX 637 (ALT 250 FOR IP): Performed by: HOSPITALIST

## 2019-08-28 RX ORDER — VENLAFAXINE HYDROCHLORIDE 75 MG/1
75 CAPSULE, EXTENDED RELEASE ORAL
Qty: 30 CAPSULE | Refills: 0 | Status: SHIPPED | OUTPATIENT
Start: 2019-08-29 | End: 2019-10-03

## 2019-08-28 RX ORDER — DRONABINOL 5 MG/1
5 CAPSULE ORAL
Qty: 6 CAPSULE | Refills: 0 | Status: SHIPPED | OUTPATIENT
Start: 2019-08-28 | End: 2019-08-31

## 2019-08-28 RX ADMIN — LOSARTAN POTASSIUM 50 MG: 50 TABLET, FILM COATED ORAL at 09:54

## 2019-08-28 RX ADMIN — GABAPENTIN 300 MG: 300 CAPSULE ORAL at 09:53

## 2019-08-28 RX ADMIN — LEVETIRACETAM 750 MG: 500 TABLET, FILM COATED ORAL at 09:53

## 2019-08-28 RX ADMIN — VENLAFAXINE HYDROCHLORIDE 75 MG: 37.5 CAPSULE, EXTENDED RELEASE ORAL at 09:53

## 2019-08-28 RX ADMIN — Medication 1 CAPSULE: at 09:53

## 2019-08-28 RX ADMIN — DRONABINOL 5 MG: 2.5 CAPSULE ORAL at 06:49

## 2019-08-28 RX ADMIN — ENOXAPARIN SODIUM 80 MG: 80 INJECTION SUBCUTANEOUS at 09:53

## 2019-08-28 RX ADMIN — GABAPENTIN 300 MG: 300 CAPSULE ORAL at 14:58

## 2019-08-28 ASSESSMENT — PAIN SCALES - GENERAL: PAINLEVEL_OUTOF10: 0

## 2019-08-28 NOTE — PROGRESS NOTES
Hospitalist Progress Note      Name:  Dev Trevino /Age/Sex: 1950  (77 y.o. male)   MRN & CSN:  [id number] & [id number] Admission Date/Time: 2019  8:40 PM   Location:  8981/6762-D PCP: Ray Oro MD       History of Present Illness:     Hospital Day: 8    Dev Trevino is a 77 y.o.  male  who presents with: acute encephalopathy     Assessment and Plan:    Acute encephalopathy, likely multifactoral. Metabolic related to dehydration, cerebral edema s/p gamma knife tx in the last month, hx of whole brain radiation in the last 6 months. - continue hydration. OP FU for radiation/cancer tx. 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 Ab stopped.  Urostomy, wound care   Severe PCM   Extensive DVT RLE s/p IVCF     Completed peer to peer for [facility] acceptance  Dc to [facility] in AM  CM talked with wife and patient about the plan after OW, considering hospice OP. Review medication list for sedating medications. Diet DIET CARDIAC; Low Sodium (2 GM)   DVT Prophylaxis [] Lovenox, []  Heparin, [] SCDs, []No VTE prophylaxis, patient ambulating   GI Prophylaxis [] PPI, [] H2 Blocker, [] No GI prophylaxis, patient is receiving diet/Tube Feeds   Code Status DNR-CC   Disposition Patient requires continued admission due to pending placement   MDM [] Low, [] Moderate,[]  High  Patient's risk as above due to two or more chronic illnesses, stable     Subjective:     Pt S&E. Wife at bedside and she assists with history. He is alert and oriented and is able to answer questions  Noted dysarthria which is his baseline s/p brain treatments. 10-14 point ROS reviewed negative, unless as noted above    Objective:        Intake/Output Summary (Last 24 hours) at 2019 2328  Last data filed at 2019 1848  Gross per 24 hour   Intake 840 ml   Output 1300 ml   Net -460 ml      Vitals:   Vitals:    19 2115   BP: (!) 144/71   Pulse: 66   Resp: 16   Temp: 98 °F (36.7 °C)   SpO2: 100%
Hospitalist Progress Note      Name:  Godwin Cody /Age/Sex: 1950  (77 y.o. male)   MRN & CSN:  [id number] & [id number] Admission Date/Time: 2019  8:40 PM   Location:  [address] [place] [address] PCP: Patrick Fowler MD         Hospital Day: 7    Assessment and Plan:   Godwin Cody is a 77 y.o.  male  who presents with altered mental status. Acute encephalopathy, suspect metabolic secondary to dehydration, resolved  Hypotension, resolved  Urinary tract infection in setting of Urostomy vs colonization/asymptomatic bacteruria  - urine Cx growing MDRO  - CXR non acute  -   Evaluated by ID today, suspect that he has bacterial colonization rather than an infection, as his symptoms have completely resolved after IV hydration. He has no finding consistent with active infection or sepsis. abx will be discontinued. AMS - acute encephalopathy  -secondary to dehydartion  - head CT non acute  - Resolved     Urostomy  - wound care     Severe PCM   Evaluated by dietitian    Seizure     Extensive DVT RLE  on 80mg BID lovenox  S/p IVF filter       Diet DIET CARDIAC; Low Sodium (2 GM)   DVT Prophylaxis [x] Lovenox, []  Heparin, [] SCDs, []No VTE prophylaxis, patient ambulating   GI Prophylaxis [] PPI, [] H2 Blocker, [x] No GI prophylaxis, patient is receiving diet/Tube Feeds   Code Status DNR-CC             History of Present Illness:       Admitted for confusion and hypotension, likely was just dehydration, only finding at the time was wbc in UA, he has a Urostomy. ID recommended stopping the abx. Pt S&E. No events overnight, significant other at bedside, denies any pain. awaiting SNF placement. Ten point ROS reviewed negative, unless as noted above    Objective:        Intake/Output Summary (Last 24 hours) at 2019 1435  Last data filed at 2019 1247  Gross per 24 hour   Intake 940 ml   Output 1775 ml   Net -835 ml      Vitals:   Vitals:    19 0900   BP: 139/80   Pulse: 91   Resp: 16
Hospitalist Progress Note      Name:  Timo Sweeney /Age/Sex: 1950  (77 y.o. male)   MRN & CSN:  [id number] & [id number] Admission Date/Time: 2019  8:40 PM   Location:  [facility]8/9820- PCP: Delos Kocher, MD         Hospital Day: 6    Assessment and Plan:   Timo Sweeney is a 77 y.o.  male  who presents with altered mental status. Acute encephalopathy, suspect metabolic secondary to dehydration, resolved  Hypotension, resolved  Complicated Urinary tract infection is setting of Urostomy  - urine Cx growing MDRO  - CXR non acute  - Switch abx to cefepime  - follow clinical course  - ID consult on Monday    AMS - acute encephalopathy  - head CT non acute  - Resolved     Urostomy  - wound care     Severe PCM   Evaluated by dietitian    Seizure     Extensive DVT RLE  on 80mg BID lovenox  S/p IVF filter       Diet DIET CARDIAC; Low Sodium (2 GM)   DVT Prophylaxis [x] Lovenox, []  Heparin, [] SCDs, []No VTE prophylaxis, patient ambulating   GI Prophylaxis [] PPI, [] H2 Blocker, [x] No GI prophylaxis, patient is receiving diet/Tube Feeds   Code Status DNR-CC             History of Present Illness:     Pt S&E. Awake, comfortable, UCx with MDRO, no events overnight. No new complaints. awaiting SNF placement    Ten point ROS reviewed negative, unless as noted above    Objective: Intake/Output Summary (Last 24 hours) at 2019 1526  Last data filed at 2019 1426  Gross per 24 hour   Intake 1200 ml   Output 825 ml   Net 375 ml      Vitals:   Vitals:    19 1000   BP: 126/83   Pulse: 87   Resp: 16   Temp: 97.6 °F (36.4 °C)   SpO2:      Physical Exam:    GEN Awake male, sitting upright in bed in no apparent distress. RESP Clear to auscultation, no wheezes, rales or rhonchi. Symmetric chest movement while on room air. CARDIO/VASC S1/S2 auscultated. Regular rate without appreciable murmurs, rubs, or gallops. RLE  edema.    GI Abdomen is soft without significant tenderness, masses, or
IVC pamphlet given to Tanja, patient's SO.   Mitchel Khanna, RN
Patient transferred to [address] [place] [address], room 4014 by bed with paperwork and personal belongings. Family at bedside. Update given to Kimberly Molina RN on telephone.   Baylee Oneill RN
Physical Therapy  . Physical Therapy Treatment Note  Name: Esteban Talbot MRN: [id number] :   1950   Date:  2019   Admission Date: 2019 Room:  [facility]     Restrictions/Precautions:  Restrictions/Precautions  Restrictions/Precautions: General Precautions, Fall Risk, Seizure       Contact Isolation    Communication with other providers:  RN Sister Bettina Brooks cleared patient for physical therapy. CoTx with RADHA Mehta. Follow up with TRACI Cleveland regarding communication with SO regarding discharge planning. Subjective:  Patient states:  'I was downstairs this morning and they thought I was going to die. \"   Pain:   Location, Type, Intensity (0/10 to 10/10): Denies pain. Objective:    Observation:  Patient semi fowlers in bed upon therapist arrival. Tele. Moderate swelling R FROYLAN Shabazz. Spouse present. Treatment, including education/measures:  Transfers  Supine to sit :maximal assistance x2 with verbal / tactile instructions  Sit to supine :maximal assistance x2 with verbal / tactile instructions  Scooting :maximal assistance x2 with verbal / tactile instructions for hand placement   Rolling :minimal assistance to roll supine > left>supine> right> supine     Patient sat EOB for 20-25 minutes with moderate - maximal assistance x2 secondary to right lateral lean and anterior forward flexed posture. Patient was given maximal verbal / tactile instructions in addition to physical assistance for righting reactions and postural corrections. Patient was able to correct upright posture for approximately 10 secondary requiring SBA. Safety  Patient left safely in the bed, with call light/phone in reach with alarm applied.          Assessment / Impression:    Patient's tolerance of treatment:  Fair    Adverse Reaction: none  Significant change in status and impact:  none  Barriers to improvement:  Cognition,   Discharge plan: pending     Plan for Next Session:    Per POC    Time in:  1120  Time out:
Waiting for call back to complete peer to peer.
patients /117, 173/105, HR 91, Just got patient back to bed also. Will recheck in 15 min. Dr Bradly Westbrook notified. Will continue to monitor.
BMI 25.0 - 29.9 Overweight    Nutrition Interventions:   Continue current diet  Continued Inpatient Monitoring, Coordination of Care, Education Not Indicated    Nutrition Evaluation:   · Evaluation: Progressing toward goals   · Goals: pt will consume greater than 75% of his meals and supplements    · Monitoring: Meal Intake, Supplement Intake, Pertinent Labs, Weight      Electronically signed by Marcus Hough RD, LUCERO on 4/90/58 at 12:11 PM    Contact Number: [phone]
joint deformities. Spontaneous movement of all extremities  SKIN Normal coloration, warm, dry. NEURO Cranial nerves appear grossly intact, normal speech, no lateralizing weakness. PSYCH Awake, alert, oriented x 4. Affect appropriate.     Medications:   Medications:    gabapentin  300 mg Oral TID    dronabinol  5 mg Oral BID AC    levETIRAcetam  750 mg Oral BID    enoxaparin  80 mg Subcutaneous BID    lactobacillus  1 capsule Oral QAM    cefTRIAXone (ROCEPHIN) IV  1 g Intravenous Q24H    venlafaxine  75 mg Oral Daily with breakfast      Infusions:    sodium chloride 100 mL/hr at 08/22/19 1131     PRN Meds:     mirtazapine 15 mg Nightly PRN   sennosides-docusate sodium 1 tablet Daily PRN   ondansetron 4 mg Q6H PRN   prochlorperazine 10 mg Q6H PRN   traMADol 50 mg Q4H PRN   acetaminophen 650 mg Q4H PRN   potassium chloride 40 mEq PRN   Or     potassium alternative oral replacement 40 mEq PRN   Or     potassium chloride 10 mEq PRN         Electronically signed by Nahid Reynoso MD on 8/22/2019 at 3:52 PM
goal 1: Pt will perform sit><supine Dony   Short term goal 2: Pt will transition sit><stand modA x 1  Short term goal 3: Pt will transfer to bed/recliner modA x 1   Short term goal 4: Pt will ambulate 30ft with RW modA x 2        Treatment plan:  Strengthening; Balance Training; Transfer Training; ROM; Functional Mobility Training; ADL/Self-care Training; Endurance Training; Gait Training; Wheelchair Mobility Training; Stair training; Neuromuscular Re-education; Home Exercise Program; Patient/Caregiver Education & Training; Modalities; Safety Education & Training; Equipment Evaluation, Education, & procurement; Positioning; Manual Therapy - Soft Tissue Mobilization  Recommendations for NURSING mobility: steady for transfers     Time:   Time in: 1312  Time out: 1348  Timed treatment minutes: 26 (focused on education and treatment.  Social functional history obtained from prior admit to hospital.  Pt admitted from SNF this time)   Total time: 39    Electronically signed by:    Karla Nuno [id number]  8/22/2019, 3:32 PM

## 2019-08-28 NOTE — CARE COORDINATION
Keyonna pt's s/o here and would like referral to [facility]. Called referral but they need to here from [place] prior them making an appt. Called and updated Ben, she will pass info on to [facility] [employer].

## 2019-08-29 LAB
EKG ATRIAL RATE: 56 BPM
EKG DIAGNOSIS: NORMAL
EKG P AXIS: -22 DEGREES
EKG P-R INTERVAL: 120 MS
EKG Q-T INTERVAL: 458 MS
EKG QRS DURATION: 94 MS
EKG QTC CALCULATION (BAZETT): 441 MS
EKG R AXIS: -27 DEGREES
EKG T AXIS: 34 DEGREES
EKG VENTRICULAR RATE: 56 BPM

## 2019-08-30 ENCOUNTER — HOSPITAL ENCOUNTER (OUTPATIENT)
Age: 69
Discharge: HOME | End: 2019-08-30

## 2019-08-30 LAB
ANION GAP SERPL CALCULATED.3IONS-SCNC: 10 MMOL/L (ref 4–16)
BUN BLDV-MCNC: 8 MG/DL (ref 6–23)
CALCIUM SERPL-MCNC: 8.6 MG/DL (ref 8.3–10.6)
CHLORIDE BLD-SCNC: 102 MMOL/L (ref 99–110)
CO2: 28 MMOL/L (ref 21–32)
CREAT SERPL-MCNC: 0.8 MG/DL (ref 0.9–1.3)
GFR AFRICAN AMERICAN: >60 ML/MIN/1.73M2
GFR NON-AFRICAN AMERICAN: >60 ML/MIN/1.73M2
GLUCOSE BLD-MCNC: 73 MG/DL (ref 70–99)
HCT VFR BLD CALC: 34.5 % (ref 42–52)
HEMOGLOBIN: 10.4 GM/DL (ref 13.5–18)
INR BLD: 1.12 INDEX
MCH RBC QN AUTO: 31.9 PG (ref 27–31)
MCHC RBC AUTO-ENTMCNC: 30.1 % (ref 32–36)
MCV RBC AUTO: 105.8 FL (ref 78–100)
PDW BLD-RTO: 15.6 % (ref 11.7–14.9)
PLATELET # BLD: 277 K/CU MM (ref 140–440)
PMV BLD AUTO: 9.4 FL (ref 7.5–11.1)
POTASSIUM SERPL-SCNC: 3.8 MMOL/L (ref 3.5–5.1)
PROTHROMBIN TIME: 13 SECONDS (ref 9.12–12.5)
RBC # BLD: 3.26 M/CU MM (ref 4.6–6.2)
SODIUM BLD-SCNC: 140 MMOL/L (ref 135–145)
WBC # BLD: 3.2 K/CU MM (ref 4–10.5)

## 2019-08-30 PROCEDURE — 85027 COMPLETE CBC AUTOMATED: CPT

## 2019-08-30 PROCEDURE — 80048 BASIC METABOLIC PNL TOTAL CA: CPT

## 2019-08-30 PROCEDURE — 36415 COLL VENOUS BLD VENIPUNCTURE: CPT

## 2019-08-30 PROCEDURE — 85610 PROTHROMBIN TIME: CPT

## 2019-09-03 ENCOUNTER — HOSPITAL ENCOUNTER (OUTPATIENT)
Age: 69
Discharge: HOME | End: 2019-09-03

## 2019-09-03 LAB
ANION GAP SERPL CALCULATED.3IONS-SCNC: 13 MMOL/L (ref 4–16)
BUN BLDV-MCNC: 9 MG/DL (ref 6–23)
CALCIUM SERPL-MCNC: 9 MG/DL (ref 8.3–10.6)
CHLORIDE BLD-SCNC: 100 MMOL/L (ref 99–110)
CO2: 29 MMOL/L (ref 21–32)
CREAT SERPL-MCNC: 0.9 MG/DL (ref 0.9–1.3)
GFR AFRICAN AMERICAN: >60 ML/MIN/1.73M2
GFR NON-AFRICAN AMERICAN: >60 ML/MIN/1.73M2
GLUCOSE BLD-MCNC: 73 MG/DL (ref 70–99)
HCT VFR BLD CALC: 39.3 % (ref 42–52)
HEMOGLOBIN: 11.9 GM/DL (ref 13.5–18)
INR BLD: 2.38 INDEX
MCH RBC QN AUTO: 31.9 PG (ref 27–31)
MCHC RBC AUTO-ENTMCNC: 30.3 % (ref 32–36)
MCV RBC AUTO: 105.4 FL (ref 78–100)
PDW BLD-RTO: 15.1 % (ref 11.7–14.9)
PLATELET # BLD: 315 K/CU MM (ref 140–440)
PMV BLD AUTO: 9.5 FL (ref 7.5–11.1)
POTASSIUM SERPL-SCNC: 4.4 MMOL/L (ref 3.5–5.1)
PROTHROMBIN TIME: 27.8 SECONDS (ref 9.12–12.5)
RBC # BLD: 3.73 M/CU MM (ref 4.6–6.2)
SODIUM BLD-SCNC: 142 MMOL/L (ref 135–145)
WBC # BLD: 3.5 K/CU MM (ref 4–10.5)

## 2019-09-03 PROCEDURE — 85610 PROTHROMBIN TIME: CPT

## 2019-09-03 PROCEDURE — 36415 COLL VENOUS BLD VENIPUNCTURE: CPT

## 2019-09-03 PROCEDURE — 85027 COMPLETE CBC AUTOMATED: CPT

## 2019-09-03 PROCEDURE — 80048 BASIC METABOLIC PNL TOTAL CA: CPT

## 2019-09-04 ENCOUNTER — HOSPITAL ENCOUNTER (OUTPATIENT)
Dept: MRI IMAGING | Age: 69
Discharge: HOME | End: 2019-09-04
Payer: MEDICARE

## 2019-09-04 DIAGNOSIS — C79.49 SECONDARY MALIGNANT NEOPLASM OF BRAIN AND SPINAL CORD (HCC): ICD-10-CM

## 2019-09-04 DIAGNOSIS — C79.31 SECONDARY MALIGNANT NEOPLASM OF BRAIN AND SPINAL CORD (HCC): ICD-10-CM

## 2019-09-04 PROCEDURE — 6360000004 HC RX CONTRAST MEDICATION: Performed by: RADIOLOGY

## 2019-09-04 PROCEDURE — 70553 MRI BRAIN STEM W/O & W/DYE: CPT

## 2019-09-04 PROCEDURE — A9579 GAD-BASE MR CONTRAST NOS,1ML: HCPCS | Performed by: RADIOLOGY

## 2019-09-04 RX ADMIN — GADOTERIDOL 15 ML: 279.3 INJECTION, SOLUTION INTRAVENOUS at 13:43

## 2019-09-06 ENCOUNTER — HOSPITAL ENCOUNTER (OUTPATIENT)
Age: 69
Discharge: HOME | End: 2019-09-06

## 2019-09-06 LAB
INR BLD: 1.52 INDEX
PROTHROMBIN TIME: 17.4 SECONDS (ref 9.12–12.5)

## 2019-09-06 PROCEDURE — 85610 PROTHROMBIN TIME: CPT

## 2019-09-06 PROCEDURE — 36415 COLL VENOUS BLD VENIPUNCTURE: CPT

## 2019-09-10 ENCOUNTER — HOSPITAL ENCOUNTER (OUTPATIENT)
Age: 69
Discharge: HOME | End: 2019-09-10

## 2019-09-10 LAB
ANION GAP SERPL CALCULATED.3IONS-SCNC: 13 MMOL/L (ref 4–16)
BUN BLDV-MCNC: 9 MG/DL (ref 6–23)
CALCIUM SERPL-MCNC: 8.9 MG/DL (ref 8.3–10.6)
CHLORIDE BLD-SCNC: 102 MMOL/L (ref 99–110)
CO2: 28 MMOL/L (ref 21–32)
CREAT SERPL-MCNC: 0.9 MG/DL (ref 0.9–1.3)
GFR AFRICAN AMERICAN: >60 ML/MIN/1.73M2
GFR NON-AFRICAN AMERICAN: >60 ML/MIN/1.73M2
GLUCOSE BLD-MCNC: 75 MG/DL (ref 70–99)
HCT VFR BLD CALC: 37.2 % (ref 42–52)
HEMOGLOBIN: 11.6 GM/DL (ref 13.5–18)
INR BLD: 2.21 INDEX
MCH RBC QN AUTO: 32.5 PG (ref 27–31)
MCHC RBC AUTO-ENTMCNC: 31.2 % (ref 32–36)
MCV RBC AUTO: 104.2 FL (ref 78–100)
PDW BLD-RTO: 14.4 % (ref 11.7–14.9)
PLATELET # BLD: 307 K/CU MM (ref 140–440)
PMV BLD AUTO: 9.5 FL (ref 7.5–11.1)
POTASSIUM SERPL-SCNC: 4.1 MMOL/L (ref 3.5–5.1)
PROTHROMBIN TIME: 25.4 SECONDS (ref 9.12–12.5)
RBC # BLD: 3.57 M/CU MM (ref 4.6–6.2)
SODIUM BLD-SCNC: 143 MMOL/L (ref 135–145)
WBC # BLD: 3.5 K/CU MM (ref 4–10.5)

## 2019-09-10 PROCEDURE — 36415 COLL VENOUS BLD VENIPUNCTURE: CPT

## 2019-09-10 PROCEDURE — 80048 BASIC METABOLIC PNL TOTAL CA: CPT

## 2019-09-10 PROCEDURE — 85610 PROTHROMBIN TIME: CPT

## 2019-09-10 PROCEDURE — 85027 COMPLETE CBC AUTOMATED: CPT

## 2019-09-13 ENCOUNTER — HOSPITAL ENCOUNTER (OUTPATIENT)
Age: 69
Discharge: HOME | End: 2019-09-13

## 2019-09-13 LAB
INR BLD: 3 INDEX
PROTHROMBIN TIME: 34.6 SECONDS (ref 9.12–12.5)

## 2019-09-13 PROCEDURE — 85610 PROTHROMBIN TIME: CPT

## 2019-09-13 PROCEDURE — 36415 COLL VENOUS BLD VENIPUNCTURE: CPT

## 2019-09-17 ENCOUNTER — HOSPITAL ENCOUNTER (OUTPATIENT)
Age: 69
Discharge: HOME | End: 2019-09-17

## 2019-09-17 LAB
ANION GAP SERPL CALCULATED.3IONS-SCNC: 18 MMOL/L (ref 4–16)
BUN BLDV-MCNC: 13 MG/DL (ref 6–23)
CALCIUM SERPL-MCNC: 8.8 MG/DL (ref 8.3–10.6)
CHLORIDE BLD-SCNC: 100 MMOL/L (ref 99–110)
CO2: 23 MMOL/L (ref 21–32)
CREAT SERPL-MCNC: 0.9 MG/DL (ref 0.9–1.3)
GFR AFRICAN AMERICAN: >60 ML/MIN/1.73M2
GFR NON-AFRICAN AMERICAN: >60 ML/MIN/1.73M2
GLUCOSE BLD-MCNC: 79 MG/DL (ref 70–99)
HCT VFR BLD CALC: 41.1 % (ref 42–52)
HEMOGLOBIN: 12.7 GM/DL (ref 13.5–18)
INR BLD: 3.66 INDEX
MCH RBC QN AUTO: 31.9 PG (ref 27–31)
MCHC RBC AUTO-ENTMCNC: 30.9 % (ref 32–36)
MCV RBC AUTO: 103.3 FL (ref 78–100)
PDW BLD-RTO: 13.8 % (ref 11.7–14.9)
PLATELET # BLD: 347 K/CU MM (ref 140–440)
PMV BLD AUTO: 9.4 FL (ref 7.5–11.1)
POTASSIUM SERPL-SCNC: 4 MMOL/L (ref 3.5–5.1)
PROTHROMBIN TIME: 42.3 SECONDS (ref 9.12–12.5)
RBC # BLD: 3.98 M/CU MM (ref 4.6–6.2)
SODIUM BLD-SCNC: 141 MMOL/L (ref 135–145)
WBC # BLD: 4.3 K/CU MM (ref 4–10.5)

## 2019-09-17 PROCEDURE — 85027 COMPLETE CBC AUTOMATED: CPT

## 2019-09-17 PROCEDURE — 80048 BASIC METABOLIC PNL TOTAL CA: CPT

## 2019-09-17 PROCEDURE — 85610 PROTHROMBIN TIME: CPT

## 2019-09-17 PROCEDURE — 36415 COLL VENOUS BLD VENIPUNCTURE: CPT

## 2019-09-20 ENCOUNTER — HOSPITAL ENCOUNTER (OUTPATIENT)
Age: 69
Setting detail: SPECIMEN
Discharge: HOME | End: 2019-09-20
Payer: MEDICARE

## 2019-09-20 LAB
INR BLD: 2 INDEX
PROTHROMBIN TIME: 23.4 SECONDS (ref 9.12–12.5)

## 2019-09-20 PROCEDURE — 85610 PROTHROMBIN TIME: CPT

## 2019-09-20 PROCEDURE — 36415 COLL VENOUS BLD VENIPUNCTURE: CPT

## 2019-09-24 ENCOUNTER — HOSPITAL ENCOUNTER (OUTPATIENT)
Age: 69
Discharge: HOME | End: 2019-09-24

## 2019-09-24 LAB
ANION GAP SERPL CALCULATED.3IONS-SCNC: 16 MMOL/L (ref 4–16)
BUN BLDV-MCNC: 14 MG/DL (ref 6–23)
CALCIUM SERPL-MCNC: 8.9 MG/DL (ref 8.3–10.6)
CHLORIDE BLD-SCNC: 100 MMOL/L (ref 99–110)
CO2: 25 MMOL/L (ref 21–32)
CREAT SERPL-MCNC: 0.9 MG/DL (ref 0.9–1.3)
GFR AFRICAN AMERICAN: >60 ML/MIN/1.73M2
GFR NON-AFRICAN AMERICAN: >60 ML/MIN/1.73M2
GLUCOSE BLD-MCNC: 106 MG/DL (ref 70–99)
HCT VFR BLD CALC: 41.6 % (ref 42–52)
HEMOGLOBIN: 12.8 GM/DL (ref 13.5–18)
INR BLD: 1.48 INDEX
MCH RBC QN AUTO: 31.8 PG (ref 27–31)
MCHC RBC AUTO-ENTMCNC: 30.8 % (ref 32–36)
MCV RBC AUTO: 103.5 FL (ref 78–100)
PDW BLD-RTO: 13.7 % (ref 11.7–14.9)
PLATELET # BLD: 381 K/CU MM (ref 140–440)
PMV BLD AUTO: 9.8 FL (ref 7.5–11.1)
POTASSIUM SERPL-SCNC: 3.8 MMOL/L (ref 3.5–5.1)
PROTHROMBIN TIME: 16.9 SECONDS (ref 9.12–12.5)
RBC # BLD: 4.02 M/CU MM (ref 4.6–6.2)
SODIUM BLD-SCNC: 141 MMOL/L (ref 135–145)
WBC # BLD: 4 K/CU MM (ref 4–10.5)

## 2019-09-24 PROCEDURE — 36415 COLL VENOUS BLD VENIPUNCTURE: CPT

## 2019-09-24 PROCEDURE — 80048 BASIC METABOLIC PNL TOTAL CA: CPT

## 2019-09-24 PROCEDURE — 85027 COMPLETE CBC AUTOMATED: CPT

## 2019-09-24 PROCEDURE — 85610 PROTHROMBIN TIME: CPT

## 2019-09-27 ENCOUNTER — HOSPITAL ENCOUNTER (OUTPATIENT)
Age: 69
Discharge: HOME | End: 2019-09-27

## 2019-09-27 LAB
INR BLD: 1.68 INDEX
PROTHROMBIN TIME: 19.6 SECONDS (ref 9.12–12.5)

## 2019-09-27 PROCEDURE — 85610 PROTHROMBIN TIME: CPT

## 2019-09-27 PROCEDURE — 36415 COLL VENOUS BLD VENIPUNCTURE: CPT

## 2019-10-01 ENCOUNTER — HOSPITAL ENCOUNTER (OUTPATIENT)
Age: 69
Discharge: HOME | End: 2019-10-01

## 2019-10-01 LAB
ANION GAP SERPL CALCULATED.3IONS-SCNC: 9 MMOL/L (ref 4–16)
BUN BLDV-MCNC: 12 MG/DL (ref 6–23)
CALCIUM SERPL-MCNC: 8.3 MG/DL (ref 8.3–10.6)
CHLORIDE BLD-SCNC: 106 MMOL/L (ref 99–110)
CO2: 29 MMOL/L (ref 21–32)
CREAT SERPL-MCNC: 0.8 MG/DL (ref 0.9–1.3)
GFR AFRICAN AMERICAN: >60 ML/MIN/1.73M2
GFR NON-AFRICAN AMERICAN: >60 ML/MIN/1.73M2
GLUCOSE BLD-MCNC: 91 MG/DL (ref 70–99)
HCT VFR BLD CALC: 39.5 % (ref 42–52)
HEMOGLOBIN: 12.3 GM/DL (ref 13.5–18)
INR BLD: 2.49 INDEX
MCH RBC QN AUTO: 31.6 PG (ref 27–31)
MCHC RBC AUTO-ENTMCNC: 31.1 % (ref 32–36)
MCV RBC AUTO: 101.5 FL (ref 78–100)
PDW BLD-RTO: 13.5 % (ref 11.7–14.9)
PLATELET # BLD: 303 K/CU MM (ref 140–440)
PMV BLD AUTO: 9.7 FL (ref 7.5–11.1)
POTASSIUM SERPL-SCNC: 4.4 MMOL/L (ref 3.5–5.1)
PROTHROMBIN TIME: 28.7 SECONDS (ref 9.12–12.5)
RBC # BLD: 3.89 M/CU MM (ref 4.6–6.2)
SODIUM BLD-SCNC: 144 MMOL/L (ref 135–145)
WBC # BLD: 4.3 K/CU MM (ref 4–10.5)

## 2019-10-01 PROCEDURE — 85027 COMPLETE CBC AUTOMATED: CPT

## 2019-10-01 PROCEDURE — 36415 COLL VENOUS BLD VENIPUNCTURE: CPT

## 2019-10-01 PROCEDURE — 85610 PROTHROMBIN TIME: CPT

## 2019-10-01 PROCEDURE — 80048 BASIC METABOLIC PNL TOTAL CA: CPT

## 2019-10-03 ENCOUNTER — APPOINTMENT (OUTPATIENT)
Dept: MRI IMAGING | Age: 69
End: 2019-10-03
Payer: MEDICARE

## 2019-10-03 ENCOUNTER — HOSPITAL ENCOUNTER (EMERGENCY)
Age: 69
Discharge: NURSING, REHAB OR LONG-TERM CARE | End: 2019-10-04
Attending: EMERGENCY MEDICINE
Payer: MEDICARE

## 2019-10-03 ENCOUNTER — APPOINTMENT (OUTPATIENT)
Dept: GENERAL RADIOLOGY | Age: 69
End: 2019-10-03
Payer: MEDICARE

## 2019-10-03 DIAGNOSIS — R53.1 GENERAL WEAKNESS: ICD-10-CM

## 2019-10-03 DIAGNOSIS — R41.82 ALTERED MENTAL STATUS, UNSPECIFIED ALTERED MENTAL STATUS TYPE: Primary | ICD-10-CM

## 2019-10-03 LAB
ALBUMIN SERPL-MCNC: 3.4 GM/DL (ref 3.4–5)
ALP BLD-CCNC: 48 IU/L (ref 40–129)
ALT SERPL-CCNC: 12 U/L (ref 10–40)
ANION GAP SERPL CALCULATED.3IONS-SCNC: 11 MMOL/L (ref 4–16)
AST SERPL-CCNC: 21 IU/L (ref 15–37)
BACTERIA: ABNORMAL /HPF
BASOPHILS ABSOLUTE: 0 K/CU MM
BASOPHILS RELATIVE PERCENT: 0.5 % (ref 0–1)
BILIRUB SERPL-MCNC: 0.2 MG/DL (ref 0–1)
BILIRUBIN URINE: NEGATIVE MG/DL
BLOOD, URINE: ABNORMAL
BUN BLDV-MCNC: 17 MG/DL (ref 6–23)
CALCIUM OXALATE CRYSTALS: ABNORMAL /HPF
CALCIUM SERPL-MCNC: 8.5 MG/DL (ref 8.3–10.6)
CHLORIDE BLD-SCNC: 96 MMOL/L (ref 99–110)
CLARITY: ABNORMAL
CO2: 25 MMOL/L (ref 21–32)
COLOR: ABNORMAL
CREAT SERPL-MCNC: 0.8 MG/DL (ref 0.9–1.3)
DIFFERENTIAL TYPE: ABNORMAL
EOSINOPHILS ABSOLUTE: 0.1 K/CU MM
EOSINOPHILS RELATIVE PERCENT: 1.7 % (ref 0–3)
GFR AFRICAN AMERICAN: >60 ML/MIN/1.73M2
GFR NON-AFRICAN AMERICAN: >60 ML/MIN/1.73M2
GLUCOSE BLD-MCNC: 105 MG/DL (ref 70–99)
GLUCOSE, URINE: NEGATIVE MG/DL
HCT VFR BLD CALC: 39.5 % (ref 42–52)
HEMOGLOBIN: 12.6 GM/DL (ref 13.5–18)
IMMATURE NEUTROPHIL %: 0 % (ref 0–0.43)
KETONES, URINE: ABNORMAL MG/DL
LEUKOCYTE ESTERASE, URINE: ABNORMAL
LYMPHOCYTES ABSOLUTE: 0.8 K/CU MM
LYMPHOCYTES RELATIVE PERCENT: 20.6 % (ref 24–44)
MCH RBC QN AUTO: 31.8 PG (ref 27–31)
MCHC RBC AUTO-ENTMCNC: 31.9 % (ref 32–36)
MCV RBC AUTO: 99.7 FL (ref 78–100)
MONOCYTES ABSOLUTE: 0.5 K/CU MM
MONOCYTES RELATIVE PERCENT: 12.8 % (ref 0–4)
MUCUS: ABNORMAL HPF
NITRITE URINE, QUANTITATIVE: NEGATIVE
NUCLEATED RBC %: 0 %
PDW BLD-RTO: 13.2 % (ref 11.7–14.9)
PH, URINE: 7 (ref 5–8)
PLATELET # BLD: 317 K/CU MM (ref 140–440)
PMV BLD AUTO: 9.2 FL (ref 7.5–11.1)
POTASSIUM SERPL-SCNC: 3.8 MMOL/L (ref 3.5–5.1)
PRO-BNP: 83.96 PG/ML
PROTEIN UA: 30 MG/DL
RBC # BLD: 3.96 M/CU MM (ref 4.6–6.2)
RBC URINE: 1 /HPF (ref 0–3)
SEGMENTED NEUTROPHILS ABSOLUTE COUNT: 2.6 K/CU MM
SEGMENTED NEUTROPHILS RELATIVE PERCENT: 64.4 % (ref 36–66)
SODIUM BLD-SCNC: 132 MMOL/L (ref 135–145)
SPECIFIC GRAVITY UA: 1.01 (ref 1–1.03)
SQUAMOUS EPITHELIAL: 1 /HPF
TOTAL IMMATURE NEUTOROPHIL: 0 K/CU MM
TOTAL NUCLEATED RBC: 0 K/CU MM
TOTAL PROTEIN: 6.1 GM/DL (ref 6.4–8.2)
TRICHOMONAS: ABNORMAL /HPF
TROPONIN T: 0.03 NG/ML
UROBILINOGEN, URINE: NORMAL MG/DL (ref 0.2–1)
WBC # BLD: 4.1 K/CU MM (ref 4–10.5)
WBC UA: 6 /HPF (ref 0–2)

## 2019-10-03 PROCEDURE — 6360000002 HC RX W HCPCS: Performed by: PHYSICIAN ASSISTANT

## 2019-10-03 PROCEDURE — 84484 ASSAY OF TROPONIN QUANT: CPT

## 2019-10-03 PROCEDURE — 6360000004 HC RX CONTRAST MEDICATION

## 2019-10-03 PROCEDURE — 99285 EMERGENCY DEPT VISIT HI MDM: CPT

## 2019-10-03 PROCEDURE — A9579 GAD-BASE MR CONTRAST NOS,1ML: HCPCS

## 2019-10-03 PROCEDURE — 85025 COMPLETE CBC W/AUTO DIFF WBC: CPT

## 2019-10-03 PROCEDURE — 2580000003 HC RX 258: Performed by: PHYSICIAN ASSISTANT

## 2019-10-03 PROCEDURE — 93005 ELECTROCARDIOGRAM TRACING: CPT | Performed by: PHYSICIAN ASSISTANT

## 2019-10-03 PROCEDURE — 80053 COMPREHEN METABOLIC PANEL: CPT

## 2019-10-03 PROCEDURE — 96375 TX/PRO/DX INJ NEW DRUG ADDON: CPT

## 2019-10-03 PROCEDURE — 87186 SC STD MICRODIL/AGAR DIL: CPT

## 2019-10-03 PROCEDURE — 81001 URINALYSIS AUTO W/SCOPE: CPT

## 2019-10-03 PROCEDURE — 71045 X-RAY EXAM CHEST 1 VIEW: CPT

## 2019-10-03 PROCEDURE — 87077 CULTURE AEROBIC IDENTIFY: CPT

## 2019-10-03 PROCEDURE — 87086 URINE CULTURE/COLONY COUNT: CPT

## 2019-10-03 PROCEDURE — 83880 ASSAY OF NATRIURETIC PEPTIDE: CPT

## 2019-10-03 PROCEDURE — 96365 THER/PROPH/DIAG IV INF INIT: CPT

## 2019-10-03 PROCEDURE — 70553 MRI BRAIN STEM W/O & W/DYE: CPT

## 2019-10-03 RX ORDER — MENTHOL AND ZINC OXIDE .44; 20.625 G/100G; G/100G
OINTMENT TOPICAL DAILY
COMMUNITY

## 2019-10-03 RX ORDER — DEXAMETHASONE 4 MG/1
4 TABLET ORAL EVERY 6 HOURS
Qty: 20 TABLET | Refills: 0 | Status: SHIPPED | OUTPATIENT
Start: 2019-10-03 | End: 2019-10-08

## 2019-10-03 RX ORDER — PETROLATUM,WHITE/LANOLIN
OINTMENT (GRAM) TOPICAL 2 TIMES DAILY
COMMUNITY

## 2019-10-03 RX ORDER — WARFARIN SODIUM 5 MG/1
TABLET ORAL NIGHTLY
Status: ON HOLD | COMMUNITY
End: 2020-01-31 | Stop reason: HOSPADM

## 2019-10-03 RX ORDER — DEXAMETHASONE SODIUM PHOSPHATE 10 MG/ML
10 INJECTION, SOLUTION INTRAMUSCULAR; INTRAVENOUS ONCE
Status: COMPLETED | OUTPATIENT
Start: 2019-10-03 | End: 2019-10-03

## 2019-10-03 RX ORDER — LORAZEPAM 2 MG/ML
INJECTION INTRAMUSCULAR
Status: DISCONTINUED
Start: 2019-10-03 | End: 2019-10-03 | Stop reason: WASHOUT

## 2019-10-03 RX ORDER — BOSWELLIA SERRATA EXTRACT 70 %
2 POWDER (GRAM) MISCELLANEOUS 2 TIMES DAILY WITH MEALS
Status: ON HOLD | COMMUNITY
End: 2020-01-27

## 2019-10-03 RX ORDER — LORAZEPAM 2 MG/ML
2 INJECTION INTRAMUSCULAR ONCE
Status: DISCONTINUED | OUTPATIENT
Start: 2019-10-03 | End: 2019-10-04 | Stop reason: HOSPADM

## 2019-10-03 RX ORDER — DRONABINOL 5 MG/1
5 CAPSULE ORAL
COMMUNITY

## 2019-10-03 RX ORDER — LORAZEPAM 2 MG/ML
INJECTION INTRAMUSCULAR
Status: DISCONTINUED
Start: 2019-10-03 | End: 2019-10-04 | Stop reason: HOSPADM

## 2019-10-03 RX ORDER — ACETAMINOPHEN 500 MG
500 TABLET ORAL EVERY 4 HOURS PRN
COMMUNITY

## 2019-10-03 RX ADMIN — CEFTRIAXONE SODIUM 1 G: 1 INJECTION, POWDER, FOR SOLUTION INTRAMUSCULAR; INTRAVENOUS at 21:24

## 2019-10-03 RX ADMIN — DEXAMETHASONE SODIUM PHOSPHATE 10 MG: 10 INJECTION, SOLUTION INTRAMUSCULAR; INTRAVENOUS at 21:24

## 2019-10-03 RX ADMIN — GADOTERIDOL 15 ML: 279.3 INJECTION, SOLUTION INTRAVENOUS at 19:05

## 2019-10-03 ASSESSMENT — PAIN SCALES - GENERAL: PAINLEVEL_OUTOF10: 6

## 2019-10-03 ASSESSMENT — PAIN DESCRIPTION - LOCATION: LOCATION: BUTTOCKS

## 2019-10-04 ENCOUNTER — HOSPITAL ENCOUNTER (OUTPATIENT)
Age: 69
Discharge: HOME | End: 2019-10-04

## 2019-10-04 VITALS
HEIGHT: 71 IN | BODY MASS INDEX: 28.4 KG/M2 | OXYGEN SATURATION: 100 % | DIASTOLIC BLOOD PRESSURE: 67 MMHG | SYSTOLIC BLOOD PRESSURE: 118 MMHG | TEMPERATURE: 98.4 F | HEART RATE: 69 BPM | RESPIRATION RATE: 16 BRPM

## 2019-10-04 LAB
INR BLD: 2.5 INDEX
PROTHROMBIN TIME: 29.3 SECONDS (ref 9.12–12.5)

## 2019-10-04 PROCEDURE — 93010 ELECTROCARDIOGRAM REPORT: CPT | Performed by: INTERNAL MEDICINE

## 2019-10-04 PROCEDURE — 36415 COLL VENOUS BLD VENIPUNCTURE: CPT

## 2019-10-04 PROCEDURE — 6360000002 HC RX W HCPCS: Performed by: PHYSICIAN ASSISTANT

## 2019-10-04 PROCEDURE — 85610 PROTHROMBIN TIME: CPT

## 2019-10-04 RX ORDER — HEPARIN SODIUM (PORCINE) LOCK FLUSH IV SOLN 100 UNIT/ML 100 UNIT/ML
100-500 SOLUTION INTRAVENOUS ONCE
Status: COMPLETED | OUTPATIENT
Start: 2019-10-04 | End: 2019-10-04

## 2019-10-04 RX ADMIN — Medication 500 UNITS: at 00:15

## 2019-10-06 LAB
CULTURE: ABNORMAL
Lab: ABNORMAL
SPECIMEN: ABNORMAL
TOTAL COLONY COUNT: ABNORMAL

## 2019-10-08 ENCOUNTER — HOSPITAL ENCOUNTER (OUTPATIENT)
Age: 69
Discharge: HOME | End: 2019-10-08

## 2019-10-08 LAB
ANION GAP SERPL CALCULATED.3IONS-SCNC: 11 MMOL/L (ref 4–16)
BUN BLDV-MCNC: 25 MG/DL (ref 6–23)
CALCIUM SERPL-MCNC: 8.4 MG/DL (ref 8.3–10.6)
CHLORIDE BLD-SCNC: 102 MMOL/L (ref 99–110)
CO2: 27 MMOL/L (ref 21–32)
CREAT SERPL-MCNC: 0.7 MG/DL (ref 0.9–1.3)
EKG ATRIAL RATE: 71 BPM
EKG DIAGNOSIS: NORMAL
EKG P AXIS: 50 DEGREES
EKG P-R INTERVAL: 136 MS
EKG Q-T INTERVAL: 418 MS
EKG QRS DURATION: 82 MS
EKG QTC CALCULATION (BAZETT): 454 MS
EKG R AXIS: -23 DEGREES
EKG T AXIS: 62 DEGREES
EKG VENTRICULAR RATE: 71 BPM
GFR AFRICAN AMERICAN: >60 ML/MIN/1.73M2
GFR NON-AFRICAN AMERICAN: >60 ML/MIN/1.73M2
GLUCOSE BLD-MCNC: 127 MG/DL (ref 70–99)
HCT VFR BLD CALC: 36.3 % (ref 42–52)
HEMOGLOBIN: 11.3 GM/DL (ref 13.5–18)
INR BLD: 1.67 INDEX
MCH RBC QN AUTO: 31.1 PG (ref 27–31)
MCHC RBC AUTO-ENTMCNC: 31.1 % (ref 32–36)
MCV RBC AUTO: 100 FL (ref 78–100)
PDW BLD-RTO: 13 % (ref 11.7–14.9)
PLATELET # BLD: 282 K/CU MM (ref 140–440)
PMV BLD AUTO: 10.1 FL (ref 7.5–11.1)
POTASSIUM SERPL-SCNC: 4.1 MMOL/L (ref 3.5–5.1)
PROTHROMBIN TIME: 19.1 SECONDS (ref 9.12–12.5)
RBC # BLD: 3.63 M/CU MM (ref 4.6–6.2)
SODIUM BLD-SCNC: 140 MMOL/L (ref 135–145)
WBC # BLD: 4.7 K/CU MM (ref 4–10.5)

## 2019-10-08 PROCEDURE — 36415 COLL VENOUS BLD VENIPUNCTURE: CPT

## 2019-10-08 PROCEDURE — 80048 BASIC METABOLIC PNL TOTAL CA: CPT

## 2019-10-08 PROCEDURE — 85027 COMPLETE CBC AUTOMATED: CPT

## 2019-10-08 PROCEDURE — 85610 PROTHROMBIN TIME: CPT

## 2019-10-11 ENCOUNTER — HOSPITAL ENCOUNTER (OUTPATIENT)
Age: 69
Discharge: HOME | End: 2019-10-11

## 2019-10-11 ENCOUNTER — HOSPITAL ENCOUNTER (OUTPATIENT)
Age: 69
Setting detail: SPECIMEN
Discharge: HOME | End: 2019-10-11
Payer: MEDICARE

## 2019-10-11 LAB
ALBUMIN SERPL-MCNC: 3.5 GM/DL (ref 3.4–5)
ALP BLD-CCNC: 46 IU/L (ref 40–129)
ALT SERPL-CCNC: 52 U/L (ref 10–40)
ANION GAP SERPL CALCULATED.3IONS-SCNC: 11 MMOL/L (ref 4–16)
AST SERPL-CCNC: 38 IU/L (ref 15–37)
BILIRUB SERPL-MCNC: 0.2 MG/DL (ref 0–1)
BUN BLDV-MCNC: 25 MG/DL (ref 6–23)
CALCIUM SERPL-MCNC: 8.2 MG/DL (ref 8.3–10.6)
CHLORIDE BLD-SCNC: 99 MMOL/L (ref 99–110)
CO2: 25 MMOL/L (ref 21–32)
CREAT SERPL-MCNC: 0.8 MG/DL (ref 0.9–1.3)
GFR AFRICAN AMERICAN: >60 ML/MIN/1.73M2
GFR NON-AFRICAN AMERICAN: >60 ML/MIN/1.73M2
GLUCOSE BLD-MCNC: 81 MG/DL (ref 70–99)
INR BLD: 2.8 INDEX
POTASSIUM SERPL-SCNC: 3.7 MMOL/L (ref 3.5–5.1)
PROTHROMBIN TIME: 32.2 SECONDS (ref 9.12–12.5)
SODIUM BLD-SCNC: 135 MMOL/L (ref 135–145)
TOTAL PROTEIN: 5.3 GM/DL (ref 6.4–8.2)

## 2019-10-11 PROCEDURE — 36415 COLL VENOUS BLD VENIPUNCTURE: CPT

## 2019-10-11 PROCEDURE — 80053 COMPREHEN METABOLIC PANEL: CPT

## 2019-10-11 PROCEDURE — 85610 PROTHROMBIN TIME: CPT

## 2019-10-15 ENCOUNTER — HOSPITAL ENCOUNTER (OUTPATIENT)
Age: 69
Discharge: HOME | End: 2019-10-15

## 2019-10-15 LAB
ANION GAP SERPL CALCULATED.3IONS-SCNC: 12 MMOL/L (ref 4–16)
BUN BLDV-MCNC: 16 MG/DL (ref 6–23)
CALCIUM SERPL-MCNC: 8.6 MG/DL (ref 8.3–10.6)
CHLORIDE BLD-SCNC: 106 MMOL/L (ref 99–110)
CO2: 27 MMOL/L (ref 21–32)
CREAT SERPL-MCNC: 0.8 MG/DL (ref 0.9–1.3)
GFR AFRICAN AMERICAN: >60 ML/MIN/1.73M2
GFR NON-AFRICAN AMERICAN: >60 ML/MIN/1.73M2
GLUCOSE BLD-MCNC: 73 MG/DL (ref 70–99)
HCT VFR BLD CALC: 38.8 % (ref 42–52)
HEMOGLOBIN: 12 GM/DL (ref 13.5–18)
INR BLD: 4.65 INDEX
MCH RBC QN AUTO: 31.8 PG (ref 27–31)
MCHC RBC AUTO-ENTMCNC: 30.9 % (ref 32–36)
MCV RBC AUTO: 102.9 FL (ref 78–100)
PDW BLD-RTO: 13.5 % (ref 11.7–14.9)
PLATELET # BLD: 245 K/CU MM (ref 140–440)
PMV BLD AUTO: 10.1 FL (ref 7.5–11.1)
POTASSIUM SERPL-SCNC: 3.7 MMOL/L (ref 3.5–5.1)
PROTHROMBIN TIME: 54.8 SECONDS (ref 11.7–14.5)
RBC # BLD: 3.77 M/CU MM (ref 4.6–6.2)
SODIUM BLD-SCNC: 145 MMOL/L (ref 135–145)
WBC # BLD: 4.4 K/CU MM (ref 4–10.5)

## 2019-10-15 PROCEDURE — 80048 BASIC METABOLIC PNL TOTAL CA: CPT

## 2019-10-15 PROCEDURE — 85610 PROTHROMBIN TIME: CPT

## 2019-10-15 PROCEDURE — 85027 COMPLETE CBC AUTOMATED: CPT

## 2019-10-15 PROCEDURE — 36415 COLL VENOUS BLD VENIPUNCTURE: CPT

## 2019-10-18 ENCOUNTER — HOSPITAL ENCOUNTER (OUTPATIENT)
Age: 69
Discharge: HOME | End: 2019-10-18

## 2019-10-18 LAB
INR BLD: 1.87 INDEX
PROTHROMBIN TIME: 21.4 SECONDS (ref 11.7–14.5)

## 2019-10-18 PROCEDURE — 85610 PROTHROMBIN TIME: CPT

## 2019-10-18 PROCEDURE — 36415 COLL VENOUS BLD VENIPUNCTURE: CPT

## 2019-10-22 ENCOUNTER — HOSPITAL ENCOUNTER (OUTPATIENT)
Age: 69
Discharge: HOME | End: 2019-10-22

## 2019-10-22 LAB
ANION GAP SERPL CALCULATED.3IONS-SCNC: 11 MMOL/L (ref 4–16)
BUN BLDV-MCNC: 18 MG/DL (ref 6–23)
CALCIUM SERPL-MCNC: 8.5 MG/DL (ref 8.3–10.6)
CHLORIDE BLD-SCNC: 105 MMOL/L (ref 99–110)
CO2: 28 MMOL/L (ref 21–32)
CREAT SERPL-MCNC: 0.8 MG/DL (ref 0.9–1.3)
GFR AFRICAN AMERICAN: >60 ML/MIN/1.73M2
GFR NON-AFRICAN AMERICAN: >60 ML/MIN/1.73M2
GLUCOSE BLD-MCNC: 75 MG/DL (ref 70–99)
HCT VFR BLD CALC: 37.9 % (ref 42–52)
HEMOGLOBIN: 11.8 GM/DL (ref 13.5–18)
INR BLD: 0.99 INDEX
MCH RBC QN AUTO: 32 PG (ref 27–31)
MCHC RBC AUTO-ENTMCNC: 31.1 % (ref 32–36)
MCV RBC AUTO: 102.7 FL (ref 78–100)
PDW BLD-RTO: 13.8 % (ref 11.7–14.9)
PLATELET # BLD: 189 K/CU MM (ref 140–440)
PMV BLD AUTO: 10 FL (ref 7.5–11.1)
POTASSIUM SERPL-SCNC: 3.9 MMOL/L (ref 3.5–5.1)
PROTHROMBIN TIME: 11.3 SECONDS (ref 11.7–14.5)
RBC # BLD: 3.69 M/CU MM (ref 4.6–6.2)
SODIUM BLD-SCNC: 144 MMOL/L (ref 135–145)
WBC # BLD: 6.3 K/CU MM (ref 4–10.5)

## 2019-10-22 PROCEDURE — 36415 COLL VENOUS BLD VENIPUNCTURE: CPT

## 2019-10-22 PROCEDURE — 85027 COMPLETE CBC AUTOMATED: CPT

## 2019-10-22 PROCEDURE — 85610 PROTHROMBIN TIME: CPT

## 2019-10-22 PROCEDURE — 80048 BASIC METABOLIC PNL TOTAL CA: CPT

## 2019-10-25 ENCOUNTER — HOSPITAL ENCOUNTER (OUTPATIENT)
Age: 69
Discharge: HOME | End: 2019-10-25

## 2019-10-25 LAB
INR BLD: 1.14 INDEX
PROTHROMBIN TIME: 13 SECONDS (ref 11.7–14.5)

## 2019-10-25 PROCEDURE — 36415 COLL VENOUS BLD VENIPUNCTURE: CPT

## 2019-10-25 PROCEDURE — 85610 PROTHROMBIN TIME: CPT

## 2019-11-01 ENCOUNTER — HOSPITAL ENCOUNTER (OUTPATIENT)
Age: 69
Setting detail: SPECIMEN
Discharge: HOME | End: 2019-11-01
Payer: MEDICARE

## 2019-11-01 LAB
ALBUMIN SERPL-MCNC: 3.5 GM/DL (ref 3.4–5)
ALP BLD-CCNC: 54 IU/L (ref 40–129)
ALT SERPL-CCNC: 21 U/L (ref 10–40)
ANION GAP SERPL CALCULATED.3IONS-SCNC: 11 MMOL/L (ref 4–16)
AST SERPL-CCNC: 18 IU/L (ref 15–37)
BILIRUB SERPL-MCNC: 0.2 MG/DL (ref 0–1)
BUN BLDV-MCNC: 22 MG/DL (ref 6–23)
CALCIUM SERPL-MCNC: 9 MG/DL (ref 8.3–10.6)
CHLORIDE BLD-SCNC: 101 MMOL/L (ref 99–110)
CO2: 28 MMOL/L (ref 21–32)
CREAT SERPL-MCNC: 0.7 MG/DL (ref 0.9–1.3)
GFR AFRICAN AMERICAN: >60 ML/MIN/1.73M2
GFR NON-AFRICAN AMERICAN: >60 ML/MIN/1.73M2
GLUCOSE BLD-MCNC: 60 MG/DL (ref 70–99)
POTASSIUM SERPL-SCNC: 4.1 MMOL/L (ref 3.5–5.1)
SODIUM BLD-SCNC: 140 MMOL/L (ref 135–145)
TOTAL PROTEIN: 5.6 GM/DL (ref 6.4–8.2)

## 2019-11-01 PROCEDURE — 80053 COMPREHEN METABOLIC PANEL: CPT

## 2019-11-22 ENCOUNTER — HOSPITAL ENCOUNTER (OUTPATIENT)
Age: 69
Setting detail: SPECIMEN
Discharge: HOME | End: 2019-11-22
Payer: MEDICARE

## 2019-11-22 LAB
ALBUMIN SERPL-MCNC: 3.4 GM/DL (ref 3.4–5)
ALP BLD-CCNC: 63 IU/L (ref 40–129)
ALT SERPL-CCNC: 31 U/L (ref 10–40)
ANION GAP SERPL CALCULATED.3IONS-SCNC: 12 MMOL/L (ref 4–16)
AST SERPL-CCNC: 29 IU/L (ref 15–37)
BILIRUB SERPL-MCNC: 0.2 MG/DL (ref 0–1)
BUN BLDV-MCNC: 18 MG/DL (ref 6–23)
CALCIUM SERPL-MCNC: 9.1 MG/DL (ref 8.3–10.6)
CHLORIDE BLD-SCNC: 99 MMOL/L (ref 99–110)
CO2: 26 MMOL/L (ref 21–32)
CREAT SERPL-MCNC: 0.7 MG/DL (ref 0.9–1.3)
GFR AFRICAN AMERICAN: >60 ML/MIN/1.73M2
GFR NON-AFRICAN AMERICAN: >60 ML/MIN/1.73M2
GLUCOSE BLD-MCNC: 106 MG/DL (ref 70–99)
POTASSIUM SERPL-SCNC: 4.5 MMOL/L (ref 3.5–5.1)
SODIUM BLD-SCNC: 137 MMOL/L (ref 135–145)
TOTAL PROTEIN: 5.8 GM/DL (ref 6.4–8.2)

## 2019-11-22 PROCEDURE — 80053 COMPREHEN METABOLIC PANEL: CPT

## 2019-12-06 ENCOUNTER — HOSPITAL ENCOUNTER (OUTPATIENT)
Dept: MRI IMAGING | Age: 69
Discharge: HOME | End: 2019-12-06
Payer: MEDICARE

## 2019-12-06 DIAGNOSIS — C79.31 SECONDARY MALIGNANT NEOPLASM OF BRAIN AND SPINAL CORD (HCC): ICD-10-CM

## 2019-12-06 DIAGNOSIS — C79.49 SECONDARY MALIGNANT NEOPLASM OF BRAIN AND SPINAL CORD (HCC): ICD-10-CM

## 2019-12-06 PROCEDURE — 70553 MRI BRAIN STEM W/O & W/DYE: CPT

## 2019-12-06 PROCEDURE — 6360000004 HC RX CONTRAST MEDICATION: Performed by: RADIOLOGY

## 2019-12-06 PROCEDURE — A9579 GAD-BASE MR CONTRAST NOS,1ML: HCPCS | Performed by: RADIOLOGY

## 2019-12-06 RX ADMIN — GADOTERIDOL 15 ML: 279.3 INJECTION, SOLUTION INTRAVENOUS at 15:56

## 2020-01-14 PROBLEM — C67.9 BLADDER CANCER (HCC): Status: ACTIVE | Noted: 2020-01-14

## 2020-01-14 RX ORDER — HEPARIN SODIUM (PORCINE) LOCK FLUSH IV SOLN 100 UNIT/ML 100 UNIT/ML
500 SOLUTION INTRAVENOUS PRN
Status: CANCELLED | OUTPATIENT
Start: 2020-01-16

## 2020-01-14 RX ORDER — SODIUM CHLORIDE 9 MG/ML
INJECTION, SOLUTION INTRAVENOUS CONTINUOUS
Status: CANCELLED | OUTPATIENT
Start: 2020-01-16

## 2020-01-14 RX ORDER — SODIUM CHLORIDE 0.9 % (FLUSH) 0.9 %
10 SYRINGE (ML) INJECTION PRN
Status: CANCELLED | OUTPATIENT
Start: 2020-01-16

## 2020-01-14 RX ORDER — EPINEPHRINE 1 MG/ML
0.3 INJECTION, SOLUTION, CONCENTRATE INTRAVENOUS PRN
Status: CANCELLED | OUTPATIENT
Start: 2020-01-16

## 2020-01-14 RX ORDER — DIPHENHYDRAMINE HYDROCHLORIDE 50 MG/ML
50 INJECTION INTRAMUSCULAR; INTRAVENOUS ONCE
Status: CANCELLED | OUTPATIENT
Start: 2020-01-16

## 2020-01-14 RX ORDER — SODIUM CHLORIDE 0.9 % (FLUSH) 0.9 %
5 SYRINGE (ML) INJECTION PRN
Status: CANCELLED | OUTPATIENT
Start: 2020-01-16

## 2020-01-14 RX ORDER — METHYLPREDNISOLONE SODIUM SUCCINATE 125 MG/2ML
125 INJECTION, POWDER, LYOPHILIZED, FOR SOLUTION INTRAMUSCULAR; INTRAVENOUS ONCE
Status: CANCELLED | OUTPATIENT
Start: 2020-01-16

## 2020-01-16 ENCOUNTER — HOSPITAL ENCOUNTER (OUTPATIENT)
Dept: INFUSION THERAPY | Age: 70
Setting detail: INFUSION SERIES
Discharge: HOME | End: 2020-01-16
Payer: MEDICARE

## 2020-01-16 VITALS
SYSTOLIC BLOOD PRESSURE: 124 MMHG | HEART RATE: 62 BPM | HEIGHT: 71 IN | DIASTOLIC BLOOD PRESSURE: 70 MMHG | RESPIRATION RATE: 16 BRPM | BODY MASS INDEX: 25.62 KG/M2 | OXYGEN SATURATION: 100 % | TEMPERATURE: 98 F | WEIGHT: 183 LBS

## 2020-01-16 DIAGNOSIS — C67.9 MALIGNANT NEOPLASM OF URINARY BLADDER, UNSPECIFIED SITE (HCC): Primary | ICD-10-CM

## 2020-01-16 LAB
ALBUMIN SERPL-MCNC: 3.8 GM/DL (ref 3.4–5)
ALP BLD-CCNC: 48 IU/L (ref 40–129)
ALT SERPL-CCNC: 21 U/L (ref 10–40)
ANION GAP SERPL CALCULATED.3IONS-SCNC: 13 MMOL/L (ref 4–16)
AST SERPL-CCNC: 23 IU/L (ref 15–37)
BANDED NEUTROPHILS ABSOLUTE COUNT: 0.18 K/CU MM
BANDED NEUTROPHILS RELATIVE PERCENT: 3 % (ref 5–11)
BASOPHILS ABSOLUTE: 0.1 K/CU MM
BASOPHILS RELATIVE PERCENT: 1 % (ref 0–1)
BILIRUB SERPL-MCNC: 0.3 MG/DL (ref 0–1)
BUN BLDV-MCNC: 19 MG/DL (ref 6–23)
CALCIUM SERPL-MCNC: 8.9 MG/DL (ref 8.3–10.6)
CHLORIDE BLD-SCNC: 105 MMOL/L (ref 99–110)
CO2: 27 MMOL/L (ref 21–32)
CREAT SERPL-MCNC: ABNORMAL MG/DL (ref 0.9–1.3)
DIFFERENTIAL TYPE: ABNORMAL
GFR AFRICAN AMERICAN: >60 ML/MIN/1.73M2
GFR NON-AFRICAN AMERICAN: >60 ML/MIN/1.73M2
GLUCOSE BLD-MCNC: 100 MG/DL (ref 70–99)
HCT VFR BLD CALC: 41.2 % (ref 42–52)
HEMOGLOBIN: 13.2 GM/DL (ref 13.5–18)
LYMPHOCYTES ABSOLUTE: 0.9 K/CU MM
LYMPHOCYTES RELATIVE PERCENT: 15 % (ref 24–44)
MCH RBC QN AUTO: 32 PG (ref 27–31)
MCHC RBC AUTO-ENTMCNC: 32 % (ref 32–36)
MCV RBC AUTO: 99.8 FL (ref 78–100)
MONOCYTES ABSOLUTE: 0.5 K/CU MM
MONOCYTES RELATIVE PERCENT: 8 % (ref 0–4)
PDW BLD-RTO: 14.4 % (ref 11.7–14.9)
PLATELET # BLD: 281 K/CU MM (ref 140–440)
PMV BLD AUTO: 9.1 FL (ref 7.5–11.1)
POLYCHROMASIA: ABNORMAL
POTASSIUM SERPL-SCNC: 3.7 MMOL/L (ref 3.5–5.1)
RBC # BLD: 4.13 M/CU MM (ref 4.6–6.2)
SEGMENTED NEUTROPHILS ABSOLUTE COUNT: 4.3 K/CU MM
SEGMENTED NEUTROPHILS RELATIVE PERCENT: 73 % (ref 36–66)
SODIUM BLD-SCNC: 145 MMOL/L (ref 135–145)
TOTAL PROTEIN: 5.9 GM/DL (ref 6.4–8.2)
WBC # BLD: 6 K/CU MM (ref 4–10.5)

## 2020-01-16 PROCEDURE — 6360000002 HC RX W HCPCS: Performed by: INTERNAL MEDICINE

## 2020-01-16 PROCEDURE — 85027 COMPLETE CBC AUTOMATED: CPT

## 2020-01-16 PROCEDURE — 85007 BL SMEAR W/DIFF WBC COUNT: CPT

## 2020-01-16 PROCEDURE — 2580000003 HC RX 258: Performed by: INTERNAL MEDICINE

## 2020-01-16 PROCEDURE — 96413 CHEMO IV INFUSION 1 HR: CPT

## 2020-01-16 PROCEDURE — 99211 OFF/OP EST MAY X REQ PHY/QHP: CPT

## 2020-01-16 PROCEDURE — 80053 COMPREHEN METABOLIC PANEL: CPT

## 2020-01-16 RX ORDER — 0.9 % SODIUM CHLORIDE 0.9 %
10 VIAL (ML) INJECTION PRN
Status: DISCONTINUED | OUTPATIENT
Start: 2020-01-16 | End: 2020-01-17 | Stop reason: HOSPADM

## 2020-01-16 RX ORDER — DEXAMETHASONE 4 MG/1
4 TABLET ORAL 4 TIMES DAILY
COMMUNITY

## 2020-01-16 RX ORDER — HEPARIN SODIUM (PORCINE) LOCK FLUSH IV SOLN 100 UNIT/ML 100 UNIT/ML
500 SOLUTION INTRAVENOUS PRN
Status: DISPENSED | OUTPATIENT
Start: 2020-01-16 | End: 2020-01-16

## 2020-01-16 RX ORDER — SODIUM CHLORIDE 9 MG/ML
INJECTION, SOLUTION INTRAVENOUS ONCE
Status: DISCONTINUED | OUTPATIENT
Start: 2020-01-16 | End: 2020-01-17 | Stop reason: HOSPADM

## 2020-01-16 RX ORDER — 0.9 % SODIUM CHLORIDE 0.9 %
20 VIAL (ML) INJECTION PRN
Status: ACTIVE | OUTPATIENT
Start: 2020-01-16 | End: 2020-01-16

## 2020-01-16 RX ADMIN — BEVACIZUMAB: 400 INJECTION, SOLUTION INTRAVENOUS at 13:50

## 2020-01-16 RX ADMIN — Medication 500 UNITS: at 14:45

## 2020-01-16 RX ADMIN — Medication 20 ML: at 14:45

## 2020-01-16 RX ADMIN — Medication 10 ML: at 14:40

## 2020-01-16 NOTE — DISCHARGE SUMMARY
Tolerated Chemo well. Reviewed discharge instructions, voiced understanding, and copies of AVS given to take home. Patient discharged home with spouse. Down to private auto per wheelchair.     Orders Placed This Encounter   Medications    sodium chloride (PF) 0.9 % injection 10 mL    sodium chloride (PF) 0.9 % injection 20 mL    heparin flush 100 UNIT/ML injection 500 Units    bevacizumab (AVASTIN) 600 mg in sodium chloride 0.9 % 100 mL IVPB    0.9 % sodium chloride infusion

## 2020-01-16 NOTE — PLAN OF CARE
To unit room 5 for Chemo. Orientated to unit. Procedure and plan of care explained. Questions answered. Understanding verbalized.

## 2020-01-27 ENCOUNTER — APPOINTMENT (OUTPATIENT)
Dept: CT IMAGING | Age: 70
DRG: 392 | End: 2020-01-27
Attending: INTERNAL MEDICINE
Payer: MEDICARE

## 2020-01-27 ENCOUNTER — APPOINTMENT (OUTPATIENT)
Dept: MRI IMAGING | Age: 70
DRG: 392 | End: 2020-01-27
Attending: INTERNAL MEDICINE
Payer: MEDICARE

## 2020-01-27 ENCOUNTER — HOSPITAL ENCOUNTER (INPATIENT)
Age: 70
LOS: 4 days | Discharge: NURSING, REHAB OR LONG-TERM CARE | DRG: 392 | End: 2020-01-31
Attending: INTERNAL MEDICINE | Admitting: INTERNAL MEDICINE
Payer: MEDICARE

## 2020-01-27 PROBLEM — R53.81 DEBILITY: Status: ACTIVE | Noted: 2020-01-27

## 2020-01-27 PROBLEM — R10.9 ABDOMINAL PAIN: Status: ACTIVE | Noted: 2020-01-27

## 2020-01-27 LAB
ALBUMIN SERPL-MCNC: 3.6 GM/DL (ref 3.4–5)
ALP BLD-CCNC: 84 IU/L (ref 40–129)
ALT SERPL-CCNC: 36 U/L (ref 10–40)
ANION GAP SERPL CALCULATED.3IONS-SCNC: 13 MMOL/L (ref 4–16)
AST SERPL-CCNC: 34 IU/L (ref 15–37)
BASOPHILS ABSOLUTE: 0 K/CU MM
BASOPHILS RELATIVE PERCENT: 0.3 % (ref 0–1)
BILIRUB SERPL-MCNC: 0.3 MG/DL (ref 0–1)
BUN BLDV-MCNC: 14 MG/DL (ref 6–23)
CALCIUM SERPL-MCNC: 8.8 MG/DL (ref 8.3–10.6)
CHLORIDE BLD-SCNC: 99 MMOL/L (ref 99–110)
CO2: 26 MMOL/L (ref 21–32)
CREAT SERPL-MCNC: 0.8 MG/DL (ref 0.9–1.3)
DIFFERENTIAL TYPE: ABNORMAL
EOSINOPHILS ABSOLUTE: 0 K/CU MM
EOSINOPHILS RELATIVE PERCENT: 0.1 % (ref 0–3)
FOLATE: >20 NG/ML (ref 3.1–17.5)
GFR AFRICAN AMERICAN: >60 ML/MIN/1.73M2
GFR NON-AFRICAN AMERICAN: >60 ML/MIN/1.73M2
GLUCOSE BLD-MCNC: 104 MG/DL (ref 70–99)
HCT VFR BLD CALC: 41 % (ref 42–52)
HEMOGLOBIN: 12.8 GM/DL (ref 13.5–18)
IMMATURE NEUTROPHIL %: 0.3 % (ref 0–0.43)
INR BLD: 2.6 INDEX
LYMPHOCYTES ABSOLUTE: 0.7 K/CU MM
LYMPHOCYTES RELATIVE PERCENT: 9.3 % (ref 24–44)
MAGNESIUM: 2.1 MG/DL (ref 1.8–2.4)
MCH RBC QN AUTO: 31.4 PG (ref 27–31)
MCHC RBC AUTO-ENTMCNC: 31.2 % (ref 32–36)
MCV RBC AUTO: 100.7 FL (ref 78–100)
MONOCYTES ABSOLUTE: 0.8 K/CU MM
MONOCYTES RELATIVE PERCENT: 10.4 % (ref 0–4)
NUCLEATED RBC %: 0 %
PDW BLD-RTO: 13.8 % (ref 11.7–14.9)
PLATELET # BLD: 263 K/CU MM (ref 140–440)
PMV BLD AUTO: 9 FL (ref 7.5–11.1)
POTASSIUM SERPL-SCNC: 4.2 MMOL/L (ref 3.5–5.1)
PROTHROMBIN TIME: 31.8 SECONDS (ref 11.7–14.5)
RBC # BLD: 4.07 M/CU MM (ref 4.6–6.2)
SEGMENTED NEUTROPHILS ABSOLUTE COUNT: 6.1 K/CU MM
SEGMENTED NEUTROPHILS RELATIVE PERCENT: 79.6 % (ref 36–66)
SODIUM BLD-SCNC: 138 MMOL/L (ref 135–145)
TOTAL IMMATURE NEUTOROPHIL: 0.02 K/CU MM
TOTAL NUCLEATED RBC: 0 K/CU MM
TOTAL PROTEIN: 5.8 GM/DL (ref 6.4–8.2)
TSH HIGH SENSITIVITY: 2.11 UIU/ML (ref 0.27–4.2)
VITAMIN B-12: 501.9 PG/ML (ref 211–911)
WBC # BLD: 7.7 K/CU MM (ref 4–10.5)

## 2020-01-27 PROCEDURE — 70553 MRI BRAIN STEM W/O & W/DYE: CPT

## 2020-01-27 PROCEDURE — 83735 ASSAY OF MAGNESIUM: CPT

## 2020-01-27 PROCEDURE — 82607 VITAMIN B-12: CPT

## 2020-01-27 PROCEDURE — 74176 CT ABD & PELVIS W/O CONTRAST: CPT

## 2020-01-27 PROCEDURE — 6370000000 HC RX 637 (ALT 250 FOR IP): Performed by: INTERNAL MEDICINE

## 2020-01-27 PROCEDURE — A9579 GAD-BASE MR CONTRAST NOS,1ML: HCPCS | Performed by: INTERNAL MEDICINE

## 2020-01-27 PROCEDURE — 84443 ASSAY THYROID STIM HORMONE: CPT

## 2020-01-27 PROCEDURE — 1200000000 HC SEMI PRIVATE

## 2020-01-27 PROCEDURE — 80053 COMPREHEN METABOLIC PANEL: CPT

## 2020-01-27 PROCEDURE — G0378 HOSPITAL OBSERVATION PER HR: HCPCS

## 2020-01-27 PROCEDURE — 6360000004 HC RX CONTRAST MEDICATION: Performed by: INTERNAL MEDICINE

## 2020-01-27 PROCEDURE — 82746 ASSAY OF FOLIC ACID SERUM: CPT

## 2020-01-27 PROCEDURE — 85610 PROTHROMBIN TIME: CPT

## 2020-01-27 PROCEDURE — 85025 COMPLETE CBC W/AUTO DIFF WBC: CPT

## 2020-01-27 PROCEDURE — 2580000003 HC RX 258: Performed by: INTERNAL MEDICINE

## 2020-01-27 RX ORDER — LACTULOSE 10 G/15ML
30 SOLUTION ORAL
Status: DISPENSED | OUTPATIENT
Start: 2020-01-28 | End: 2020-01-28

## 2020-01-27 RX ORDER — MIRTAZAPINE 15 MG/1
45 TABLET, FILM COATED ORAL NIGHTLY
Status: DISCONTINUED | OUTPATIENT
Start: 2020-01-27 | End: 2020-01-31 | Stop reason: HOSPADM

## 2020-01-27 RX ORDER — GABAPENTIN 300 MG/1
300 CAPSULE ORAL 3 TIMES DAILY
Status: DISCONTINUED | OUTPATIENT
Start: 2020-01-27 | End: 2020-01-31 | Stop reason: HOSPADM

## 2020-01-27 RX ORDER — DRONABINOL 2.5 MG/1
5 CAPSULE ORAL
Status: DISCONTINUED | OUTPATIENT
Start: 2020-01-28 | End: 2020-01-31 | Stop reason: HOSPADM

## 2020-01-27 RX ORDER — DEXAMETHASONE 4 MG/1
2 TABLET ORAL
Status: DISCONTINUED | OUTPATIENT
Start: 2020-01-28 | End: 2020-01-31 | Stop reason: HOSPADM

## 2020-01-27 RX ORDER — WARFARIN SODIUM 6 MG/1
6 TABLET ORAL
Status: DISCONTINUED | OUTPATIENT
Start: 2020-01-27 | End: 2020-01-29

## 2020-01-27 RX ORDER — POLYETHYLENE GLYCOL 3350 17 G/17G
17 POWDER, FOR SOLUTION ORAL DAILY
Status: DISCONTINUED | OUTPATIENT
Start: 2020-01-27 | End: 2020-01-31 | Stop reason: HOSPADM

## 2020-01-27 RX ORDER — SENNA AND DOCUSATE SODIUM 50; 8.6 MG/1; MG/1
1 TABLET, FILM COATED ORAL 2 TIMES DAILY PRN
Status: DISCONTINUED | OUTPATIENT
Start: 2020-01-27 | End: 2020-01-31 | Stop reason: HOSPADM

## 2020-01-27 RX ORDER — SODIUM CHLORIDE 0.9 % (FLUSH) 0.9 %
10 SYRINGE (ML) INJECTION PRN
Status: DISCONTINUED | OUTPATIENT
Start: 2020-01-27 | End: 2020-01-31 | Stop reason: HOSPADM

## 2020-01-27 RX ORDER — ONDANSETRON 2 MG/ML
4 INJECTION INTRAMUSCULAR; INTRAVENOUS EVERY 6 HOURS PRN
Status: DISCONTINUED | OUTPATIENT
Start: 2020-01-27 | End: 2020-01-31 | Stop reason: HOSPADM

## 2020-01-27 RX ORDER — WARFARIN SODIUM 6 MG/1
6 TABLET ORAL
Status: DISCONTINUED | OUTPATIENT
Start: 2020-01-29 | End: 2020-01-27

## 2020-01-27 RX ORDER — CIPROFLOXACIN 2 MG/ML
400 INJECTION, SOLUTION INTRAVENOUS ONCE
Status: COMPLETED | OUTPATIENT
Start: 2020-01-27 | End: 2020-01-28

## 2020-01-27 RX ORDER — SODIUM CHLORIDE 0.9 % (FLUSH) 0.9 %
10 SYRINGE (ML) INJECTION EVERY 12 HOURS SCHEDULED
Status: DISCONTINUED | OUTPATIENT
Start: 2020-01-27 | End: 2020-01-31 | Stop reason: HOSPADM

## 2020-01-27 RX ORDER — BISACODYL 10 MG
10 SUPPOSITORY, RECTAL RECTAL DAILY PRN
Status: DISCONTINUED | OUTPATIENT
Start: 2020-01-27 | End: 2020-01-31 | Stop reason: HOSPADM

## 2020-01-27 RX ORDER — POLYETHYLENE GLYCOL 3350 17 G/17G
17 POWDER, FOR SOLUTION ORAL DAILY PRN
Status: DISCONTINUED | OUTPATIENT
Start: 2020-01-27 | End: 2020-01-31 | Stop reason: HOSPADM

## 2020-01-27 RX ORDER — WARFARIN SODIUM 5 MG/1
5 TABLET ORAL NIGHTLY
Status: DISCONTINUED | OUTPATIENT
Start: 2020-01-27 | End: 2020-01-27 | Stop reason: DRUGHIGH

## 2020-01-27 RX ORDER — LACTOBACILLUS RHAMNOSUS GG 10B CELL
1 CAPSULE ORAL
Status: DISCONTINUED | OUTPATIENT
Start: 2020-01-28 | End: 2020-01-31 | Stop reason: HOSPADM

## 2020-01-27 RX ORDER — VITAMIN B COMPLEX
1 CAPSULE ORAL DAILY
Status: DISCONTINUED | OUTPATIENT
Start: 2020-01-28 | End: 2020-01-31 | Stop reason: HOSPADM

## 2020-01-27 RX ADMIN — SODIUM CHLORIDE, PRESERVATIVE FREE 10 ML: 5 INJECTION INTRAVENOUS at 22:15

## 2020-01-27 RX ADMIN — LEVETIRACETAM 750 MG: 500 TABLET, FILM COATED ORAL at 22:20

## 2020-01-27 RX ADMIN — GABAPENTIN 300 MG: 300 CAPSULE ORAL at 22:14

## 2020-01-27 RX ADMIN — GADOTERIDOL 15 ML: 279.3 INJECTION, SOLUTION INTRAVENOUS at 19:12

## 2020-01-27 RX ADMIN — MIRTAZAPINE 45 MG: 15 TABLET, FILM COATED ORAL at 22:14

## 2020-01-27 RX ADMIN — SENNOSIDES AND DOCUSATE SODIUM 1 TABLET: 8.6; 5 TABLET ORAL at 22:14

## 2020-01-27 ASSESSMENT — PAIN SCALES - GENERAL
PAINLEVEL_OUTOF10: 0
PAINLEVEL_OUTOF10: 0

## 2020-01-27 NOTE — PLAN OF CARE
Problem: Falls - Risk of:  Goal: Will remain free from falls  Description  Will remain free from falls  Outcome: Met This Shift  Goal: Absence of physical injury  Description  Absence of physical injury  Outcome: Met This Shift     Problem: Daily Care:  Goal: Daily care needs are met  Description  Daily care needs are met  Outcome: Met This Shift

## 2020-01-28 PROCEDURE — 1200000000 HC SEMI PRIVATE

## 2020-01-28 PROCEDURE — 6360000002 HC RX W HCPCS: Performed by: INTERNAL MEDICINE

## 2020-01-28 PROCEDURE — 97530 THERAPEUTIC ACTIVITIES: CPT

## 2020-01-28 PROCEDURE — 97163 PT EVAL HIGH COMPLEX 45 MIN: CPT

## 2020-01-28 PROCEDURE — 94761 N-INVAS EAR/PLS OXIMETRY MLT: CPT

## 2020-01-28 PROCEDURE — 96366 THER/PROPH/DIAG IV INF ADDON: CPT

## 2020-01-28 PROCEDURE — 90686 IIV4 VACC NO PRSV 0.5 ML IM: CPT | Performed by: INTERNAL MEDICINE

## 2020-01-28 PROCEDURE — G0008 ADMIN INFLUENZA VIRUS VAC: HCPCS | Performed by: INTERNAL MEDICINE

## 2020-01-28 PROCEDURE — 6370000000 HC RX 637 (ALT 250 FOR IP): Performed by: INTERNAL MEDICINE

## 2020-01-28 PROCEDURE — 96365 THER/PROPH/DIAG IV INF INIT: CPT

## 2020-01-28 PROCEDURE — 97535 SELF CARE MNGMENT TRAINING: CPT

## 2020-01-28 PROCEDURE — 2500000003 HC RX 250 WO HCPCS: Performed by: INTERNAL MEDICINE

## 2020-01-28 PROCEDURE — 96368 THER/DIAG CONCURRENT INF: CPT

## 2020-01-28 PROCEDURE — 97167 OT EVAL HIGH COMPLEX 60 MIN: CPT

## 2020-01-28 PROCEDURE — 2580000003 HC RX 258: Performed by: INTERNAL MEDICINE

## 2020-01-28 PROCEDURE — 99221 1ST HOSP IP/OBS SF/LOW 40: CPT | Performed by: INTERNAL MEDICINE

## 2020-01-28 RX ORDER — CIPROFLOXACIN 2 MG/ML
400 INJECTION, SOLUTION INTRAVENOUS EVERY 12 HOURS
Status: DISCONTINUED | OUTPATIENT
Start: 2020-01-28 | End: 2020-01-31 | Stop reason: HOSPADM

## 2020-01-28 RX ADMIN — DRONABINOL 5 MG: 2.5 CAPSULE ORAL at 16:38

## 2020-01-28 RX ADMIN — CIPROFLOXACIN 400 MG: 2 INJECTION, SOLUTION INTRAVENOUS at 00:11

## 2020-01-28 RX ADMIN — SODIUM CHLORIDE, PRESERVATIVE FREE 10 ML: 5 INJECTION INTRAVENOUS at 10:09

## 2020-01-28 RX ADMIN — LEVETIRACETAM 750 MG: 500 TABLET, FILM COATED ORAL at 21:34

## 2020-01-28 RX ADMIN — GABAPENTIN 300 MG: 300 CAPSULE ORAL at 21:34

## 2020-01-28 RX ADMIN — WARFARIN SODIUM 4.5 MG: 2.5 TABLET ORAL at 21:35

## 2020-01-28 RX ADMIN — INFLUENZA A VIRUS A/BRISBANE/02/2018 IVR-190 (H1N1) ANTIGEN (PROPIOLACTONE INACTIVATED), INFLUENZA A VIRUS A/KANSAS/14/2017 X-327 (H3N2) ANTIGEN (PROPIOLACTONE INACTIVATED), INFLUENZA B VIRUS B/MARYLAND/15/2016 ANTIGEN (PROPIOLACTONE INACTIVATED), INFLUENZA B VIRUS B/PHUKET/3073/2013 BVR-1B ANTIGEN (PROPIOLACTONE INACTIVATED) 0.5 ML: 15; 15; 15; 15 INJECTION, SUSPENSION INTRAMUSCULAR at 10:07

## 2020-01-28 RX ADMIN — GABAPENTIN 300 MG: 300 CAPSULE ORAL at 06:08

## 2020-01-28 RX ADMIN — DEXAMETHASONE 2 MG: 4 TABLET ORAL at 10:06

## 2020-01-28 RX ADMIN — POLYETHYLENE GLYCOL (3350) 17 G: 17 POWDER, FOR SOLUTION ORAL at 10:07

## 2020-01-28 RX ADMIN — Medication 1 CAPSULE: at 10:07

## 2020-01-28 RX ADMIN — GABAPENTIN 300 MG: 300 CAPSULE ORAL at 14:29

## 2020-01-28 RX ADMIN — METRONIDAZOLE 500 MG: 500 INJECTION, SOLUTION INTRAVENOUS at 14:28

## 2020-01-28 RX ADMIN — CIPROFLOXACIN 400 MG: 2 INJECTION, SOLUTION INTRAVENOUS at 14:28

## 2020-01-28 RX ADMIN — POLYETHYLENE GLYCOL (3350) 17 G: 17 POWDER, FOR SOLUTION ORAL at 00:02

## 2020-01-28 RX ADMIN — SODIUM CHLORIDE, PRESERVATIVE FREE 10 ML: 5 INJECTION INTRAVENOUS at 21:34

## 2020-01-28 RX ADMIN — WARFARIN SODIUM 6 MG: 6 TABLET ORAL at 00:02

## 2020-01-28 RX ADMIN — METRONIDAZOLE 500 MG: 500 INJECTION, SOLUTION INTRAVENOUS at 00:19

## 2020-01-28 RX ADMIN — DRONABINOL 5 MG: 2.5 CAPSULE ORAL at 06:08

## 2020-01-28 RX ADMIN — LACTULOSE 30 G: 10 SOLUTION ORAL at 06:08

## 2020-01-28 RX ADMIN — Medication: at 10:09

## 2020-01-28 RX ADMIN — METRONIDAZOLE 500 MG: 500 INJECTION, SOLUTION INTRAVENOUS at 21:34

## 2020-01-28 RX ADMIN — LEVETIRACETAM 750 MG: 500 TABLET, FILM COATED ORAL at 10:07

## 2020-01-28 RX ADMIN — MIRTAZAPINE 45 MG: 15 TABLET, FILM COATED ORAL at 21:53

## 2020-01-28 ASSESSMENT — PAIN SCALES - GENERAL
PAINLEVEL_OUTOF10: 0
PAINLEVEL_OUTOF10: 0

## 2020-01-28 NOTE — H&P
History and Physical      Name:  Leoncio Branham /Age/Sex: 1950  (68 y.o. male)   MRN & CSN:  [id number] & [id number] Admission Date/Time: 2020  4:56 PM   Location:  [address] [place] [address] PCP: Shankar Ceron MD       Hospital Day: 1    Assessment and Plan:   Leoncio Branham is a 68 y.o.  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 bruit  and gamma knife surgery 2019 history of right lower extremity DVT 2019 status post IVC filter on warfarin presented with generalized weakness    -Generalized weakness and difficulty performing activities of daily living difficulty transfers over the past 10 days probably related to chemotherapy  -Abdominal pain probably musculoskeletal  -Left parietal lobe tumor status post whole brain radiation and gamma knife surgery has right lower extremity weakness; currently on chemotherapy on Avastin    Plan  Consult heme-onc  PT OT evaluation  Follow CT abdomen pelvis report          Diet No diet orders on file   DVT Prophylaxis [] Lovenox, []  Heparin, [] SCDs, [] Ambulation   GI Prophylaxis [] PPI,  [] H2 Blocker,  [] Carafate,  [] Diet/Tube Feeds   Code Status Prior   Disposition Patient requires continued admission due to    MDM [] Low, [] Moderate,[]  High  Patient's risk as above due to      History of Present Illness:     Chief Complaint: Generalized weakness  Leoncio Branham is a 68 y.o.  male  who presents with generalized weakness. Patient is currently receiving chemotherapy Avastin last dose was 12 days ago. Since then he has been having worsening generalized weakness difficulty in transfers and carrying on activities of daily living. Significant other at bedside reported she noticed that patient will have slow in processing things. No falls. No loss of consciousness.   No headache nausea vomiting chest pain shortness of breath fever chills change in urostomy Hypertension     Nocturia     Shortness of breath on exertion     Sleep apnea     No CPAP \"I Use A Mouth Piece\"    Teeth missing     Upper And Lower    Urinary frequency     Wears glasses     [place] teeth extracted     4 [place] Teeth Extracted In Past     PSHX:  has a past surgical history that includes Elbow surgery (Right, Early ); eye surgery (Bilateral, ); [place] tooth extraction; Cystoscopy (05/15/2018); Colonoscopy (Last Done In ); Endoscopy, colon, diagnostic (); Vasectomy (); other surgical history (2018); Tunneled venous port placement (Right, 2018); and brain surgery. Allergies: Allergies   Allergen Reactions    Niacin And Related Hives    Furosemide        FAM HX: family history includes Alzheimer's Disease in his mother; Other in his brother.   Soc HX:   Social History     Socioeconomic History    Marital status: Life Partner     Spouse name: Not on file    Number of children: Not on file    Years of education: Not on file    Highest education level: Not on file   Occupational History    Not on file   Social Needs    Financial resource strain: Not on file    Food insecurity:     Worry: Not on file     Inability: Not on file    Transportation needs:     Medical: Not on file     Non-medical: Not on file   Tobacco Use    Smoking status: Former Smoker     Packs/day: 2.00     Years: 11.00     Pack years: 22.00     Types: Cigarettes     Start date:      Last attempt to quit: 1975     Years since quittin.1    Smokeless tobacco: Never Used   Substance and Sexual Activity    Alcohol use: Yes     Comment: \"A Couple Times A Year\"    Drug use: No    Sexual activity: Not Currently   Lifestyle    Physical activity:     Days per week: Not on file     Minutes per session: Not on file    Stress: Not on file   Relationships    Social connections:     Talks on phone: Not on file     Gets together: Not on file     Attends Che[employer] service: Not on file

## 2020-01-28 NOTE — PROGRESS NOTES
CT abdomen pelvis findings suspicious for acute sigmoid diverticulitis however patient has abdominal pain only upon moving around or palpating the area which is inconsistent with CT findings. Moreover he is afebrile no leukocytosis.   Will give one-time dose of ciprofloxacin and Flagyl and for continuation of antibiotics and further management to daytime hospitalist.

## 2020-01-28 NOTE — PROGRESS NOTES
 levETIRAcetam  750 mg Oral BID    zinc oxide   Topical Daily    mirtazapine  45 mg Oral Nightly    sodium chloride flush  10 mL Intravenous 2 times per day    warfarin  6 mg Oral Once per day on Mon Wed Fri    And    warfarin  4.5 mg Oral Once per day on Sun Tue Thu Sat    polyethylene glycol  17 g Oral Daily      Infusions:   PRN Meds: sennosides-docusate sodium, 1 tablet, BID PRN  sodium chloride flush, 10 mL, PRN  ondansetron, 4 mg, Q6H PRN  polyethylene glycol, 17 g, Daily PRN  bisacodyl, 10 mg, Daily PRN        Data/Labs:     Recent Labs     01/27/20 2135   WBC 7.7   HGB 12.8*   HCT 41.0*         Recent Labs     01/27/20 2135      K 4.2   CL 99   CO2 26   BUN 14   CREATININE 0.8*     Recent Labs     01/27/20 2135   AST 34   ALT 36   BILITOT 0.3   ALKPHOS 84     Recent Labs     01/27/20 2135   INR 2.60     No results for input(s): CKTOTAL, CKMB, CKMBINDEX, TROPONINT in the last 72 hours. HgBA1c: No results found for: LABA1C  CALCIUM:  8.8/26 (01/27 2135)    I/O last 3 completed shifts: In: 540 [P.O.:530;  I.V.:10]  Out: 1651 [Urine:1650; Stool:1]    Intake/Output Summary (Last 24 hours) at 1/28/2020 1554  Last data filed at 1/28/2020 1419  Gross per 24 hour   Intake 540 ml   Output 1651 ml   Net -1111 ml

## 2020-01-28 NOTE — PROGRESS NOTES
(01/28/20 1530)  Mobility Inpatient CMS 0-100% Score: 68.66 (01/28/20 1530)  Mobility Inpatient CMS G-Code Modifier : CL (01/28/20 1530)          Goals  Short term goals  Time Frame for Short term goals: 1 week  Short term goal 1: Pt to complete all bed mobility mod I  Short term goal 2: Pt to complete stand pivot transfer LRAD with CGA  Short term goal 3: Pt to initiate ambulation with LRAD       Therapy Time   Individual Concurrent Group Co-treatment   Time In 1250         Time Out 1310         Minutes 4300 [address]

## 2020-01-28 NOTE — PROGRESS NOTES
c assistance for power and wt shift forward. Stand pivot: ModA bed to chair toward L    Standing balance: CGA-Fred c RW c assistance to wt shift anteriorly. Good proprioception but reduced strength to weight shift forward. ModA for wt shift for a step and moving walker. Stands up to 60 seconds 2 separate reps. Ambulation:  Unable. 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 Pt needs OT during LOS and at d/c to inc functional indep and reduce caregiver burden. Pt states he can have ~modA for t/f at home. )    Pt is a 68year old M directly admitted for testing . 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 He reports weakness and inc caregiver burden last 10 days since finishing radiation. He would benefit from OT during LOS to address mobility, modified ADL, strength, and balance. Goals:  By d/c or goals met:     Pt will perform all bed mobility with Fred in prep for EOB/OOB activity. Pt will perform all functional transfers with Fred and appropriate use of LRD to bed, toilet, chair in prep for increased functional independence. Pt will perform UB ADLs with CGA to increase functional independence. Pt will perform LB ADLs with ModA to increase functional independence.     Pt will perform all aspects of toileting with Fred to increase functional

## 2020-01-28 NOTE — CARE COORDINATION
Received referral for ARU. Will review patients clinicals and PT/OT notes. Need clarification regarding chemo/radiation tx. D/w Jose Holland CM. Per Jose Holland she'll clarify chemo/radiation tx and get back to me. Will continue to follow.   Thank you for the referral.

## 2020-01-28 NOTE — PROGRESS NOTES
Physical Therapy    Physical Therapy Treatment Note  Name: Grzegorz Sherman MRN: [id number] :   1950   Date:  2020   Admission Date: 2020 Room:  [address] [place], [place] [address]A   Restrictions/Precautions:        Fall risk  Communication with other providers:  Aide requesting help with pericare and transfer  Subjective:  Patient states:  \"I just am tired right now\"  Pain:   Location, Type, Intensity (0/10 to 10/10): Denies  Objective:    Observation:  Pt on BS with significant other present    Treatment, including education/measures:  Pt stood from [facility] mod A with cues for hand placement and 3 count utilized for initiation. Pt stood ~2 minutes during pericare and then transferred via stand pivot to bed mod A with cues for sequencing and R knee and foot blocked during all transfers. Pt and significant other educated on differences between home health care, skilled nursing facility, and acute rehab therapy options. All questions answered. Assessment / Impression:       Patient's tolerance of treatment:  well   Adverse Reaction: n/a  Significant change in status and impact:  n/a  Barriers to improvement:  Fatigue, decreased strength  Plan for Next Session:    Continue to address transfer training, initiation of gait, and balance in future sessions.     Time in:  1350  Time out:  1400  Timed treatment minutes:  10  Total treatment time:  10    Previously filed items:  Social/Functional History  Type of Home: House  Home Access: Ramped entrance  Home Equipment: Rolling walker, 4 wheeled walker, Martha Satchel bed  Short term goals  Time Frame for Short term goals: 1 week  Short term goal 1: Pt to complete all bed mobility mod I  Short term goal 2: Pt to complete stand pivot transfer LRAD with CGA  Short term goal 3: Pt to initiate ambulation with LRAD       Electronically signed by:    Khadijah Rosa PT  2020, 3:34 PM

## 2020-01-28 NOTE — CONSULTS
MONOSABS 0.8 01/27/2020    LYMPHSABS 0.7 01/27/2020    EOSABS 0.0 01/27/2020    BASOSABS 0.0 01/27/2020    DIFFTYPE AUTOMATED DIFFERENTIAL 01/27/2020     CMP:    Lab Results   Component Value Date     01/27/2020    K 4.2 01/27/2020    CL 99 01/27/2020    CO2 26 01/27/2020    BUN 14 01/27/2020    CREATININE 0.8 01/27/2020    GFRAA >60 01/27/2020    LABGLOM >60 01/27/2020    GLUCOSE 104 01/27/2020    PROT 5.8 01/27/2020    LABALBU 3.6 01/27/2020    CALCIUM 8.8 01/27/2020    BILITOT 0.3 01/27/2020    ALKPHOS 84 01/27/2020    AST 34 01/27/2020    ALT 36 01/27/2020      INR 2.6 on 1/27/2020. IMPRESSION/RECOMMENDATIONS:      1. 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 Found to have brain mets and had WBRT and SBRT at [facility]  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 May need one or two more cycles as recommended by Dr Sommer Mims from [facility] but I will hold for now. Recent studies were stable. He lives with spouse, who is overwhelmed at home to take care of him. Son will move back from [place] to help. I had discussion about potential hospice, and nursing placement. His previous wife was with hospice. He seemed to understand what hospice is. I will get palliative/hospice evaluation. Will get PT/OT evaluation also. 2. Developing diverticulosis. I thanked Dr Farrah Daniel who has been taking care of him here. He received antibiotic. 3. H/o RLE DVT s/p IVC filter. On warfarin. INR is therapeutic. Thank you for allowing me to participate in his care.

## 2020-01-29 LAB
INR BLD: 3.27 INDEX
PROTHROMBIN TIME: 40 SECONDS (ref 11.7–14.5)

## 2020-01-29 PROCEDURE — 97530 THERAPEUTIC ACTIVITIES: CPT

## 2020-01-29 PROCEDURE — 2500000003 HC RX 250 WO HCPCS: Performed by: INTERNAL MEDICINE

## 2020-01-29 PROCEDURE — 97535 SELF CARE MNGMENT TRAINING: CPT

## 2020-01-29 PROCEDURE — 1200000000 HC SEMI PRIVATE

## 2020-01-29 PROCEDURE — 6370000000 HC RX 637 (ALT 250 FOR IP): Performed by: INTERNAL MEDICINE

## 2020-01-29 PROCEDURE — 96366 THER/PROPH/DIAG IV INF ADDON: CPT

## 2020-01-29 PROCEDURE — 99232 SBSQ HOSP IP/OBS MODERATE 35: CPT | Performed by: INTERNAL MEDICINE

## 2020-01-29 PROCEDURE — 2580000003 HC RX 258: Performed by: INTERNAL MEDICINE

## 2020-01-29 PROCEDURE — 85610 PROTHROMBIN TIME: CPT

## 2020-01-29 PROCEDURE — 6360000002 HC RX W HCPCS: Performed by: INTERNAL MEDICINE

## 2020-01-29 PROCEDURE — 97116 GAIT TRAINING THERAPY: CPT

## 2020-01-29 RX ADMIN — GABAPENTIN 300 MG: 300 CAPSULE ORAL at 13:43

## 2020-01-29 RX ADMIN — SODIUM CHLORIDE, PRESERVATIVE FREE 10 ML: 5 INJECTION INTRAVENOUS at 21:59

## 2020-01-29 RX ADMIN — SODIUM CHLORIDE, PRESERVATIVE FREE 10 ML: 5 INJECTION INTRAVENOUS at 06:03

## 2020-01-29 RX ADMIN — Medication: at 07:16

## 2020-01-29 RX ADMIN — MIRTAZAPINE 45 MG: 15 TABLET, FILM COATED ORAL at 21:58

## 2020-01-29 RX ADMIN — DRONABINOL 5 MG: 2.5 CAPSULE ORAL at 07:04

## 2020-01-29 RX ADMIN — POLYETHYLENE GLYCOL (3350) 17 G: 17 POWDER, FOR SOLUTION ORAL at 09:22

## 2020-01-29 RX ADMIN — DEXAMETHASONE 2 MG: 4 TABLET ORAL at 09:21

## 2020-01-29 RX ADMIN — Medication 1 CAPSULE: at 09:21

## 2020-01-29 RX ADMIN — METRONIDAZOLE 500 MG: 500 INJECTION, SOLUTION INTRAVENOUS at 21:58

## 2020-01-29 RX ADMIN — SODIUM CHLORIDE, PRESERVATIVE FREE 10 ML: 5 INJECTION INTRAVENOUS at 09:24

## 2020-01-29 RX ADMIN — GABAPENTIN 300 MG: 300 CAPSULE ORAL at 06:02

## 2020-01-29 RX ADMIN — CIPROFLOXACIN 400 MG: 2 INJECTION, SOLUTION INTRAVENOUS at 03:39

## 2020-01-29 RX ADMIN — DRONABINOL 5 MG: 2.5 CAPSULE ORAL at 15:57

## 2020-01-29 RX ADMIN — GABAPENTIN 300 MG: 300 CAPSULE ORAL at 21:58

## 2020-01-29 RX ADMIN — METRONIDAZOLE 500 MG: 500 INJECTION, SOLUTION INTRAVENOUS at 06:03

## 2020-01-29 RX ADMIN — CIPROFLOXACIN 400 MG: 2 INJECTION, SOLUTION INTRAVENOUS at 17:18

## 2020-01-29 RX ADMIN — LEVETIRACETAM 750 MG: 500 TABLET, FILM COATED ORAL at 21:58

## 2020-01-29 RX ADMIN — LEVETIRACETAM 750 MG: 500 TABLET, FILM COATED ORAL at 09:22

## 2020-01-29 ASSESSMENT — PAIN SCALES - GENERAL
PAINLEVEL_OUTOF10: 0

## 2020-01-29 NOTE — PROGRESS NOTES
Amalia Lay is a 68 y.o. male on warfarin therapy for DVT. Pharmacy consulted by Dr. Narda Sylvester for monitoring and adjustment of treatment. Indication for anticoagulation: hx of DVT (7/2019)  INR goal: 2-3  Warfarin dose prior to admission: 6 mg Mon/Wed/Fri, 4.5 mg all other days     Pertinent Laboratory Values   Recent Labs     01/27/20  2135 01/29/20  0329   INR 2.60 3.27   HGB 12.8*  --    HCT 41.0*  --      --        Assessment/Plan:   Drug Interactions: ciprofloxacin and metronidazole may increase anticoagulant effects of warfarin   INR 3.27, supra-therapeutic likely due to antibiotic interactions   Hold warfarin today   [address] [place] [address] will continue to monitor and adjust warfarin therapy as indicated    Thank you for the consult.   Ari Kwan RPh  1/29/2020 4:00 PM

## 2020-01-29 NOTE — PROGRESS NOTES
Physical Therapy    Physical Therapy Treatment Note  Name: Bailey Chance MRN: [id number] :   1950   Date:  2020   Admission Date: 2020 Room:  [address] [place] [address]   Restrictions/Precautions:        contact precautions, fall risk, urostomy. Communication with other providers:  Sukh ESTRADA   Subjective:  Patient states:  \"I was good until this last round of chemo\"  Pain:   Location, Type, Intensity (0/10 to 10/10):  Did not quantify. Reported discomfort between big toe and second toe on right foot due to ingrown toe nail. Objective:  Observation:    Sitting in recliner upon arrival. Agreeable to work with therapy. Significant other present towards end of session. Numbness to right LE. Adequate time for rest between activity with generalized fatigue and weakness. Treatment, including education/measures:  Sit to stand: Performed 5x with modA from recliner and couch. VCs for UE placement to push from seat surface vs pull from RW. Provided with demonstration on set up of LE. Poor awareness to right LE due to lack of sensory input. Blocked right foot from sliding during transition and provided over pressure for joint approximation at knee for greater stability and effective use of right LE. Stand to sit: min/modA for eccentric control. VCs for sequencing UEs and placement of right LE. Step pivot: (2x) modA initially progressing to Dony with improved carry over of sequencing. Performed with RW. Initially was impulsive to sit prior to completing full pivot turn. Poor placement of right LE during step transfer keeping LE extended with very minimal support to steady self. Ambulation: 12ft x 2 with RW Dony for steadying.  Demonstrated dec pace, significantly fwd flexed trunk, dec foot clearance right LE, inconsistent step length and width due to lack of sensation to right LE.  4ft x 2 with RW Dony fwd and bkwd with focus of foot placement, posture, and overall sequencing with RW.   Educated pt on POC, role of PT, DME, discharge recommendations. VC/demonstrations for sequencing, posture, weight shift, balance, UE/LE placement to inc safety and indep with mobility. Assessment / Impression:    Patient's tolerance of treatment:  Good   Adverse Reaction: no  Significant change in status and impact:  no  Barriers to improvement:  Activity tolerance ,strength, balance, pain, sensation/proprioception.    Plan for Next Session:    Activity tolerance, strength, balance, gait, transfers, bed mobility   Time in:  1404  Time out:  1431  Timed treatment minutes:  27  Total treatment time:  27    Previously filed items:  Social/Functional History  Type of Home: House  Home Access: Ramped entrance  Home Equipment: Rolling walker, 4 wheeled walker, [employer] 450 bed  Short term goals  Time Frame for Short term goals: 1 week  Short term goal 1: Pt to complete all bed mobility mod I  Short term goal 2: Pt to complete stand pivot transfer LRAD with CGA  Short term goal 3: Pt to initiate ambulation with LRAD       Electronically signed by:    Papi Falcon, [id number]  1/30/2020, 8:38 AM

## 2020-01-29 NOTE — PROGRESS NOTES
[facility] PROGRESS NOTE  Date: 1/29/2020   Name: Elin Trotter   MRN: [id number]   YOB: 1950  No chief complaint on file. Subjective/Interval Hx:     -Patient feels that his abdominal pain is improving. He also feels like he is still weak but feels like he has a little more strength  -Peripheral IV was lost and patient's port was accessed. ROS: negative unless mentioned above  Objective:   Physical Exam:   BP (!) 152/79   Pulse 62   Temp 98.1 °F (36.7 °C) (Oral)   Resp 16   Ht 5' 11\" (1.803 m)   Wt 188 lb 12.8 oz (85.6 kg)   SpO2 97%   BMI 26.33 kg/m²   CONSTITUTIONAL: No acute distress  HENT: Normocephalic atraumatic  LUNGS: Clear to auscultation bilaterally breath sounds positive  CARDIOVASCULAR: S1-S2 regular rate and rhythm  ABDOMEN: Soft, urostomy on right side, nontender, nondistended  GENITAL/URINARY: Has urostomy bag  MUSCULOSKELETAL/Extremities: Right leg has 1+ pitting edema (chronic greater than 6 months)  NEUROLOGIC: AAO, no gross deficits  SKIN: No gross lesions    Assessment/Plan:     1. Acute diverticulitis  seen on CT abdomen pelvis. On Cipro and Flagyl  -abdominal pain improved  2. Generalized weakness  likely due to combination of diverticulitis and cancer treatment. PT OT  3. 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 on Avastin chemotherapy. Heme-onc following. MRI brain does not appear to show any acute changes.  -Oncology holding Avastin until follows up as outpatient. 4. Right lower extremity DVT status post IVC filter  on warfarin  -pharmacy to dose, since INR is elevated. DVT Prophylaxis: Warfarin  Diet: DIET GENERAL;  Code Status: Full Code      Dispo:  -Await PT OT Case management working on placement. Possible acute rehab, since INR is elevated. versus SNF.     Lazaro Zheng MD  1/29/2020    Meds:   Meds:    warfarin (COUMADIN) daily dosing (placeholder)   Other RX Placeholder    ciprofloxacin  400 mg Intravenous Q12H    metroNIDAZOLE  500 mg Intravenous Q8H    b complex vitamins  1 capsule Oral Daily    dexamethasone  2 mg Oral Daily with breakfast    dronabinol  5 mg Oral BID AC    gabapentin  300 mg Oral TID    lactobacillus  1 capsule Oral Daily with breakfast    levETIRAcetam  750 mg Oral BID    zinc oxide   Topical Daily    mirtazapine  45 mg Oral Nightly    sodium chloride flush  10 mL Intravenous 2 times per day    polyethylene glycol  17 g Oral Daily      Infusions:   PRN Meds: sennosides-docusate sodium, 1 tablet, BID PRN  sodium chloride flush, 10 mL, PRN  ondansetron, 4 mg, Q6H PRN  polyethylene glycol, 17 g, Daily PRN  bisacodyl, 10 mg, Daily PRN        Data/Labs:     Recent Labs     01/27/20 2135   WBC 7.7   HGB 12.8*   HCT 41.0*         Recent Labs     01/27/20 2135      K 4.2   CL 99   CO2 26   BUN 14   CREATININE 0.8*     Recent Labs     01/27/20 2135   AST 34   ALT 36   BILITOT 0.3   ALKPHOS 84     Recent Labs     01/27/20 2135 01/29/20  0329   INR 2.60 3.27     No results for input(s): CKTOTAL, CKMB, CKMBINDEX, TROPONINT in the last 72 hours. HgBA1c: No results found for: LABA1C  CALCIUM:  8.8/26 (01/27 2135)    I/O last 3 completed shifts:   In: 520 [P.O.:510; I.V.:10]  Out: 500 [Urine:500]    Intake/Output Summary (Last 24 hours) at 1/29/2020 7829  Last data filed at 1/29/2020 1757  Gross per 24 hour   Intake 1060 ml   Output 0 ml   Net 1060 ml

## 2020-01-29 NOTE — CARE COORDINATION
Per Oncology note patients Avastin will be held. Patients primary insurance is [employer]. Pre-cert needed. Pre-cert initiated and pending at this time. Reference #[id number]. Will continue to follow for determination.

## 2020-01-30 LAB
INR BLD: 2.85 INDEX
PROTHROMBIN TIME: 34.8 SECONDS (ref 11.7–14.5)

## 2020-01-30 PROCEDURE — 96366 THER/PROPH/DIAG IV INF ADDON: CPT

## 2020-01-30 PROCEDURE — 6370000000 HC RX 637 (ALT 250 FOR IP): Performed by: HOSPITALIST

## 2020-01-30 PROCEDURE — 97530 THERAPEUTIC ACTIVITIES: CPT

## 2020-01-30 PROCEDURE — 97116 GAIT TRAINING THERAPY: CPT

## 2020-01-30 PROCEDURE — 97112 NEUROMUSCULAR REEDUCATION: CPT

## 2020-01-30 PROCEDURE — 6360000002 HC RX W HCPCS: Performed by: INTERNAL MEDICINE

## 2020-01-30 PROCEDURE — 85610 PROTHROMBIN TIME: CPT

## 2020-01-30 PROCEDURE — 2580000003 HC RX 258: Performed by: INTERNAL MEDICINE

## 2020-01-30 PROCEDURE — 99232 SBSQ HOSP IP/OBS MODERATE 35: CPT | Performed by: NURSE PRACTITIONER

## 2020-01-30 PROCEDURE — 6370000000 HC RX 637 (ALT 250 FOR IP): Performed by: INTERNAL MEDICINE

## 2020-01-30 PROCEDURE — 2500000003 HC RX 250 WO HCPCS: Performed by: INTERNAL MEDICINE

## 2020-01-30 PROCEDURE — 36415 COLL VENOUS BLD VENIPUNCTURE: CPT

## 2020-01-30 PROCEDURE — 97535 SELF CARE MNGMENT TRAINING: CPT

## 2020-01-30 PROCEDURE — 1200000000 HC SEMI PRIVATE

## 2020-01-30 RX ORDER — WARFARIN SODIUM 2.5 MG/1
2.5 TABLET ORAL DAILY
Status: DISCONTINUED | OUTPATIENT
Start: 2020-01-30 | End: 2020-01-31

## 2020-01-30 RX ADMIN — MIRTAZAPINE 45 MG: 15 TABLET, FILM COATED ORAL at 21:59

## 2020-01-30 RX ADMIN — GABAPENTIN 300 MG: 300 CAPSULE ORAL at 06:35

## 2020-01-30 RX ADMIN — METRONIDAZOLE 500 MG: 500 INJECTION, SOLUTION INTRAVENOUS at 21:59

## 2020-01-30 RX ADMIN — LEVETIRACETAM 750 MG: 500 TABLET, FILM COATED ORAL at 21:59

## 2020-01-30 RX ADMIN — SODIUM CHLORIDE, PRESERVATIVE FREE 10 ML: 5 INJECTION INTRAVENOUS at 08:45

## 2020-01-30 RX ADMIN — CIPROFLOXACIN 400 MG: 2 INJECTION, SOLUTION INTRAVENOUS at 02:13

## 2020-01-30 RX ADMIN — DEXAMETHASONE 2 MG: 4 TABLET ORAL at 08:44

## 2020-01-30 RX ADMIN — Medication 1 CAPSULE: at 08:44

## 2020-01-30 RX ADMIN — METRONIDAZOLE 500 MG: 500 INJECTION, SOLUTION INTRAVENOUS at 17:46

## 2020-01-30 RX ADMIN — DRONABINOL 5 MG: 2.5 CAPSULE ORAL at 06:34

## 2020-01-30 RX ADMIN — WARFARIN SODIUM 2.5 MG: 2.5 TABLET ORAL at 17:46

## 2020-01-30 RX ADMIN — CIPROFLOXACIN 400 MG: 2 INJECTION, SOLUTION INTRAVENOUS at 14:36

## 2020-01-30 RX ADMIN — GABAPENTIN 300 MG: 300 CAPSULE ORAL at 14:37

## 2020-01-30 RX ADMIN — LEVETIRACETAM 750 MG: 500 TABLET, FILM COATED ORAL at 08:44

## 2020-01-30 RX ADMIN — SODIUM CHLORIDE, PRESERVATIVE FREE 10 ML: 5 INJECTION INTRAVENOUS at 22:00

## 2020-01-30 RX ADMIN — DRONABINOL 5 MG: 2.5 CAPSULE ORAL at 17:46

## 2020-01-30 RX ADMIN — METRONIDAZOLE 500 MG: 500 INJECTION, SOLUTION INTRAVENOUS at 06:35

## 2020-01-30 RX ADMIN — POLYETHYLENE GLYCOL (3350) 17 G: 17 POWDER, FOR SOLUTION ORAL at 08:44

## 2020-01-30 RX ADMIN — Medication: at 08:49

## 2020-01-30 RX ADMIN — GABAPENTIN 300 MG: 300 CAPSULE ORAL at 21:59

## 2020-01-30 NOTE — PROGRESS NOTES
performed today included toileting. Toilet Transfer: Min A x2 c RW c cues as above, using elevated BSC frame over toilet. Toileting: Max A for assist for hygiene and clothing mgmt up, pt performed clothing mgmt down c steadying assist.     All therapeutic intervention performed c emphasis on dynamic balance / standing tolerance to inc strength, endurance and act tolerance for inc Indep c ADL tasks, func transfers / mobility. Safety  Patient safely in bedside chair at end of session, with call light/phone in reach, and nurse present for patient care. Gait belt was used for func transfers / mobility. Assessment / Impression:        Patient's tolerance of treatment:  Well   Adverse Reaction: None  Significant change in status and impact:  None  Barriers to improvement:  Neuropathy, RLE, decreased endurance    Plan for Next Session:    Continue per OT POC. Time in:  1407  Time out:  1448  Timed treatment minutes:  41  Total treatment time:  41    Electronically signed by:    TITUS Allen  1/30/2020, 11:09 AM    Previously filed values:    Goals:  By d/c or goals met:  Pt will perform all bed mobility with Fred in prep for EOB/OOB activity. Pt will perform all functional transfers with Fred and appropriate use of LRD to bed, toilet, chair in prep for increased functional independence. Pt will perform UB ADLs with CGA to increase functional independence. Pt will perform LB ADLs with ModA to increase functional independence. Pt will perform all aspects of toileting with Fred to increase functional independence. Pt will participate in therex/therax c emphasis on strength, activity tolerance,  safety, DAYA tasks.

## 2020-01-30 NOTE — PROGRESS NOTES
Shonna Pastor is a 68 y.o. male on warfarin therapy for DVT. Pharmacy consulted by Dr. Magdaleno Pressley for monitoring and adjustment of treatment. Indication for anticoagulation: hx of DVT (7/2019)  INR goal: 2-3  Warfarin dose prior to admission: 6 mg Mon/Wed/Fri, 4.5 mg all other days     Pertinent Laboratory Values   Recent Labs     01/27/20  2135  01/30/20  0644   INR 2.60   < > 2.85   HGB 12.8*  --   --    HCT 41.0*  --   --      --   --     < > = values in this interval not displayed. Assessment/Plan:   Drug Interactions: ciprofloxacin and metronidazole may increase anticoagulant effects of warfarin   INR 2.85, start warfarin 2.5mg daily   [address] [place] [address] will continue to monitor and adjust warfarin therapy as indicated    Thank you for the consult.   Cal Caro [facility]  1/30/2020 3:35 PM

## 2020-01-30 NOTE — PROGRESS NOTES
HEMATOLOGY & ONCOLOGY progress note  [facility]      Patient was seen and examined today. Not in any acute distress and no overnight evnets. He feels he is less weak than previously. Abdominal pain resolved. No new complaints. REVIEW OF SYSTEMS    Constitutional:  Denies fever, chills, loss of appetite, weight loss or weakness   Respiratory:  Denies cough, shortness of breath or hemoptysis  Cardiovascular:  Denies chest pain, palpitations or swelling   GI:  Denies abdominal pain, nausea, vomiting,, constipation or diarrhea   Musculoskeletal:  Denies back pain   Skin:  Denies rash   Neurologic:  Denies headache, focal weakness or sensory changes   All systems negative except as marked. PHYSICAL EXAM    Vitals: BP (!) 163/77 Comment: rolando rn was noifited  Pulse 57   Temp 97.1 °F (36.2 °C) (Oral)   Resp 16   Ht 5' 11\" (1.803 m)   Wt 193 lb 12.8 oz (87.9 kg)   SpO2 96%   BMI 27.03 kg/m²   General appearance: alert, appears stated age and cooperative. Skin: Skin color, texture, turgor normal.bruising noted, large scab to right forearm  Lungs: clear to auscultation bilaterally  Heart: regular rate and rhythm, S1, S2 normal, no murmur,Abdomen: soft, non-tender; bowel sounds normal; no masses,  no organomegaly  Extremities: extremities normal, atraumatic, no cyanosis or edema  Neurologic: Mental status: Alert, oriented    LABORATORY RESULTS  CBC:   Recent Labs     01/27/20 2135   WBC 7.7   HGB 12.8*        BMP:    Recent Labs     01/27/20 2135      K 4.2   CL 99   CO2 26   BUN 14   CREATININE 0.8*   GLUCOSE 104*     Hepatic:   Recent Labs     01/27/20 2135   AST 34   ALT 36   BILITOT 0.3   ALKPHOS 84     INR:   Recent Labs     01/27/20  2135 01/29/20  0329 01/30/20  0644   INR 2.60 3.27 2.85           ASSESSMENT/RECOMMENDATION  1. H/O small cell bladder with brain mets   -overall stable disease   -Radiation necrosis. Avastin on hold.  Will see as outpatient    2. Diverticulitis   -cipro/flagyl    3. Deconditioning   -PT/OT   -Rehab    4. RLE DVT   -warfarin; pharmacy dosing   -     We will continue to follow the patient. Thank you.

## 2020-01-31 VITALS
OXYGEN SATURATION: 96 % | SYSTOLIC BLOOD PRESSURE: 150 MMHG | HEIGHT: 71 IN | RESPIRATION RATE: 16 BRPM | WEIGHT: 193.9 LBS | TEMPERATURE: 97.8 F | HEART RATE: 78 BPM | BODY MASS INDEX: 27.15 KG/M2 | DIASTOLIC BLOOD PRESSURE: 76 MMHG

## 2020-01-31 LAB
INR BLD: 2.46 INDEX
PROTHROMBIN TIME: 30 SECONDS (ref 11.7–14.5)

## 2020-01-31 PROCEDURE — 6360000002 HC RX W HCPCS: Performed by: INTERNAL MEDICINE

## 2020-01-31 PROCEDURE — 2500000003 HC RX 250 WO HCPCS: Performed by: INTERNAL MEDICINE

## 2020-01-31 PROCEDURE — 97530 THERAPEUTIC ACTIVITIES: CPT

## 2020-01-31 PROCEDURE — 96366 THER/PROPH/DIAG IV INF ADDON: CPT

## 2020-01-31 PROCEDURE — 36415 COLL VENOUS BLD VENIPUNCTURE: CPT

## 2020-01-31 PROCEDURE — 97116 GAIT TRAINING THERAPY: CPT

## 2020-01-31 PROCEDURE — 97110 THERAPEUTIC EXERCISES: CPT

## 2020-01-31 PROCEDURE — 6370000000 HC RX 637 (ALT 250 FOR IP): Performed by: INTERNAL MEDICINE

## 2020-01-31 PROCEDURE — 2580000003 HC RX 258: Performed by: INTERNAL MEDICINE

## 2020-01-31 PROCEDURE — 85610 PROTHROMBIN TIME: CPT

## 2020-01-31 PROCEDURE — 99232 SBSQ HOSP IP/OBS MODERATE 35: CPT | Performed by: NURSE PRACTITIONER

## 2020-01-31 PROCEDURE — 6370000000 HC RX 637 (ALT 250 FOR IP): Performed by: HOSPITALIST

## 2020-01-31 RX ORDER — METRONIDAZOLE 500 MG/1
500 TABLET ORAL 3 TIMES DAILY
Qty: 18 TABLET | Refills: 0 | DISCHARGE
Start: 2020-01-31 | End: 2020-02-06

## 2020-01-31 RX ORDER — CIPROFLOXACIN 500 MG/1
500 TABLET, FILM COATED ORAL 2 TIMES DAILY
Qty: 12 TABLET | Refills: 0 | DISCHARGE
Start: 2020-01-31 | End: 2020-02-06

## 2020-01-31 RX ORDER — WARFARIN SODIUM 3 MG/1
3 TABLET ORAL DAILY
Qty: 30 TABLET | Refills: 3 | DISCHARGE
Start: 2020-01-31 | End: 2020-03-02 | Stop reason: DRUGHIGH

## 2020-01-31 RX ORDER — POLYETHYLENE GLYCOL 3350 17 G/17G
17 POWDER, FOR SOLUTION ORAL DAILY PRN
Qty: 527 G | Refills: 1 | DISCHARGE
Start: 2020-01-31 | End: 2020-03-01

## 2020-01-31 RX ORDER — WARFARIN SODIUM 3 MG/1
3 TABLET ORAL DAILY
Status: DISCONTINUED | OUTPATIENT
Start: 2020-01-31 | End: 2020-01-31 | Stop reason: HOSPADM

## 2020-01-31 RX ADMIN — CIPROFLOXACIN 400 MG: 2 INJECTION, SOLUTION INTRAVENOUS at 14:55

## 2020-01-31 RX ADMIN — CIPROFLOXACIN 400 MG: 2 INJECTION, SOLUTION INTRAVENOUS at 01:53

## 2020-01-31 RX ADMIN — METRONIDAZOLE 500 MG: 500 INJECTION, SOLUTION INTRAVENOUS at 06:13

## 2020-01-31 RX ADMIN — Medication 1 CAPSULE: at 10:50

## 2020-01-31 RX ADMIN — DRONABINOL 5 MG: 2.5 CAPSULE ORAL at 16:27

## 2020-01-31 RX ADMIN — GABAPENTIN 300 MG: 300 CAPSULE ORAL at 14:55

## 2020-01-31 RX ADMIN — SODIUM CHLORIDE, PRESERVATIVE FREE 10 ML: 5 INJECTION INTRAVENOUS at 10:54

## 2020-01-31 RX ADMIN — GABAPENTIN 300 MG: 300 CAPSULE ORAL at 06:13

## 2020-01-31 RX ADMIN — DRONABINOL 5 MG: 2.5 CAPSULE ORAL at 06:13

## 2020-01-31 RX ADMIN — LEVETIRACETAM 750 MG: 500 TABLET, FILM COATED ORAL at 10:50

## 2020-01-31 RX ADMIN — METRONIDAZOLE 500 MG: 500 INJECTION, SOLUTION INTRAVENOUS at 14:55

## 2020-01-31 RX ADMIN — Medication 1 CAPSULE: at 10:51

## 2020-01-31 RX ADMIN — POLYETHYLENE GLYCOL (3350) 17 G: 17 POWDER, FOR SOLUTION ORAL at 10:50

## 2020-01-31 RX ADMIN — DEXAMETHASONE 2 MG: 4 TABLET ORAL at 10:50

## 2020-01-31 RX ADMIN — WARFARIN SODIUM 3 MG: 3 TABLET ORAL at 18:19

## 2020-01-31 ASSESSMENT — PAIN SCALES - GENERAL: PAINLEVEL_OUTOF10: 0

## 2020-01-31 NOTE — PROGRESS NOTES
improved    3. Deconditioning   -PT/OT   -  Rehab cert pending    4. RLE DVT   -warfarin; pharmacy dosing   -     We will continue to follow the patient. Thank you.

## 2020-01-31 NOTE — PROGRESS NOTES
Physical Therapy  . Physical Therapy Treatment Note  Name: Timbo Barron MRN: [id number] :   1950   Date:  2020   Admission Date: 2020 Room:  [address] [place] [address]       Restrictions/Precautions:        General Precautions, Fall Risk, Contact Isolation     Communication with other providers:  SEAN Arteaga cleared patient for physical therapy. Julio Arteaga notified that pill was found in patient's bed after transfer. CoTx with Karen Campbell. Subjective:  Patient states:  Agreeable to therapy. Pain:   Location, Type, Intensity (0/10 to 10/10): Denies pain. Objective:    Observation:  Patient semi fowlers in bed upon therapist arrival. Urostomy attached to boyd bag. Port to R chest.  A&O x4      Treatment, including education/measures:  Transfers  Supine to sit :moderate assistance x2; x1 LOB posteriorly requiring maximal assistance   Sit to supine :NT  Scooting :moderate assistance x2  Rolling :NT  Sit to stand :moderate assistance x2; Patient requires x2 attempts secondary to R LE positioning. Patient completed successful transfer with bilateral foot block. Stand to sit :moderate assistance x2  Patient requires verbal instructions for positioning of bilateral lowe extremities prior to transfers. SPT:moderate assistance x2 with RW  Patient requires maximal verbal instructions for R LE to increase step length. Patient demonstrates ability to take 5-6 steps from bed > chair with RW moderate assistance x2. Gait:  Patient ambulated with RW for 5 ft with moderate assistance x2 and chair follow . Patient presents with increased weakness R LE requiring increased verbal and tactile instructions for sequencing and devices management. Sitting balance:  Patient tolerated 3 minutes of unsupported sitting balance to increase trunk strength. Patient required SBA with no LOB. Patient was limited by fatigue and generalized weakness.      Safety  Patient left safely in the chair, with call light/phone in

## 2020-01-31 NOTE — PROGRESS NOTES
Occupational Therapy  . Occupational Therapy Treatment Note  Name: Mica Ndiaye MRN: [id number] :   1950   Date:  2020   Admission Date: 2020 Room:  [address] [place] [address]     Restrictions/Precautions:    General precautions; Fall Risk; contact precautions    Communication with other providers:  SEAN Paris cleared pt for Tx session. Co-Tx c SHAMAR Clifton for this pt who benefits from skilled therapeutic intervention for transfer training safely. Subjective:  Patient states:  Pt agreeable to Tx session, declined need for toileting. Pain:   Location, Type, Intensity (0/10 to 10/10):  0/10, denies pain    Objective:    Observation:  Pt received in semi-fowlers on bed. Pt required max increased time and effort c multiple rest breaks >5 minutes to manage activity tolerance. Objective Measures:  Urostomy c Shabazz bag; Port upper R chest    Treatment, including education:  Therapeutic Activity Training:   Therapeutic activity training was instructed today. Cues were given for safety, sequence, UE/LE placement, awareness, and balance. Activities performed today included bed mobility training, sup-sit, sit-stand, SPT. Supine to sit: Moderate Assistance x2 c LOB x1 posteriorly, requiring increased assistance and increased time . Sit to supine: N/A  Scooting: Mod A x2  Sitting balance / tolerance: SBA x3 minutes x2 trials c supported seated rest break between. Sit<>stands: Mod A x2 c RW + max cues for safe body positioning, full trunk extension, blocking of R foot during sit to stands. Stand step transfer: Mod A x2 c RW + max vc's for safe body positioning, including 5-6 steps from EOB to bedside chair. Functional Mobility: Mod A x2 c RW ~5 ft c chair follow, requires sequential cues for RW mgmt and postural cues for trunk extension. See PT note for additional details. Therapeutic Exercise:  Cues were given for technique, safety, recruitment, and rationale. Cues were verbal and/or tactile.   Pt Sup +

## 2020-01-31 NOTE — PLAN OF CARE
Problem: Risk for Impaired Skin Integrity  Goal: Tissue integrity - skin and mucous membranes  Description  Structural intactness and normal physiological function of skin and  mucous membranes. Outcome: Ongoing     Problem: Falls - Risk of:  Goal: Will remain free from falls  Description  Will remain free from falls  Outcome: Ongoing  Goal: Absence of physical injury  Description  Absence of physical injury  Outcome: Ongoing     Problem: Safety:  Goal: Free from accidental physical injury  Description  Free from accidental physical injury  Outcome: Ongoing  Goal: Free from intentional harm  Description  Free from intentional harm  Outcome: Ongoing     Problem: Daily Care:  Goal: Daily care needs are met  Description  Daily care needs are met  Outcome: Ongoing     Problem: Pain:  Goal: Patient's pain/discomfort is manageable  Description  Patient's pain/discomfort is manageable  Outcome: Ongoing     Problem: Skin Integrity:  Goal: Skin integrity will stabilize  Description  Skin integrity will stabilize  Outcome: Ongoing     Problem: Discharge Planning:  Goal: Patients continuum of care needs are met  Description  Patients continuum of care needs are met  Outcome: Ongoing     Problem:  Bowel/Gastric:  Goal: Bowel function will improve  Description  Bowel function will improve  Outcome: Ongoing  Goal: Diagnostic test results will improve  Description  Diagnostic test results will improve  Outcome: Ongoing  Goal: Occurrences of nausea will decrease  Description  Occurrences of nausea will decrease  Outcome: Ongoing  Goal: Occurrences of vomiting will decrease  Description  Occurrences of vomiting will decrease  Outcome: Ongoing

## 2020-01-31 NOTE — DISCHARGE SUMMARY
Hospital Medicine  DISCHARGE SUMMARY  Date: 1/31/2020  Name: Major Patel  MRN: [id number]  YOB: 1950     Patient's PCP: Jerry Cadena MD  Admit Date: 1/27/2020   Discharge Date: 1/31/2020  Admitting Physician: Savana Rizo MD  Discharge Physician: Mehrdad Marinelli MD      Discharge Diagnoses:  1. Acute diverticulitis  2. Generalized weakness  3. 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 Right lower extremity DVT status post IVC filter        HPI:    Chief Complaint: Generalized weakness  Major Patel is a 68 y.o.  male  who presents with generalized weakness. Patient is currently receiving chemotherapy Avastin last dose was 12 days ago. Since then he has been having worsening generalized weakness difficulty in transfers and carrying on activities of daily living. Significant other at bedside reported she noticed that patient will have slow in processing things. No falls. No loss of consciousness. No headache nausea vomiting chest pain shortness of breath fever chills change in urostomy output. He has constipation. He has abdominal pain worse upon movement and palpation. When pain is present aching and pressure nature 8 out of 10 in severity intermittent nonradiating left side. Hospital Course  Patient came in with weakness and abdominal pain. He was found to have acute diverticulitis on CT scan of the abdomen. He was placed on IV antibiotics. Abdominal pain improved. He was discharged on Cipro and Flagyl. Patient had generalized weakness. This was likely due to the combination of diverticulitis and cancer treatment. Patient was seen by PT and OT. 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 He has had radiation therapy to the brain in the past.  He was on Avastin. MRI of the brain did not appear to show any acute changes. Oncology was holding Avastin until patient follows up as an outpatient.     Patient had a right lower extremity DVT and also has an IVC filter. He was on warfarin. His warfarin was being dosed by the pharmacy. Patient was stable. He was discharged to the nursing home. Physical Exam:  BP (!) 150/76   Pulse 78   Temp 97.8 °F (36.6 °C) (Oral)   Resp 16   Ht 5' 11\" (1.803 m)   Wt 193 lb 14.4 oz (88 kg)   SpO2 96%   BMI 27.04 kg/m²   CONSTITUTIONAL: No acute distress  HENT: Normocephalic atraumatic  LUNGS: Clear to auscultation bilaterally breath sounds positive  CARDIOVASCULAR: S1-S2 regular rate and rhythm  ABDOMEN: Soft, urostomy on right side, nontender, nondistended  GENITAL/URINARY: Has urostomy bag  MUSCULOSKELETAL/Extremities: Right leg has 1+ pitting edema (chronic greater than 6 months)  NEUROLOGIC: AAO, no gross deficits  SKIN: No gross lesions    Consultations  IP CONSULT TO CASE MANAGEMENT  IP CONSULT TO ONCOLOGY  IP CONSULT TO HOSPICE  IP CONSULT TO PHARMACY    Invasive procedures:   none    Significant Diagnostic Studies:    Imaging  Ct Abdomen Pelvis Wo Contrast Additional Contrast? Radiologist Recommendation  Result Date: 1/29/2020  1. Findings suspicious for acute sigmoid diverticulitis. No evidence of perforation. 2. Otherwise no acute findings in the abdomen or pelvis. No noncontrast evidence of metastatic disease. 3. Coronary artery disease and aortic and mitral valvular calcification. Mri Brain W Wo Contrast  Result Date: 1/27/2020  1. No acute intracranial abnormality. 2. Redemonstration of the patient's known high left parietal intraparenchymal mass with persistent marginal enhancement and diffusion restricting most consistent with residual hypercellular neoplastic tissue. The degree of surrounding vasogenic edema is not appreciably changed and there is no significant mass effect. Increased marginal intrinsic T1 hyperintensity and punctate foci of susceptibility artifact may relate to sequela of prior treatment or internal microhemorrhage.  3. Extensive white matter signal [facility] [id number]  [address] [facility]      [address] [place] [address]  [phone]   Alaina Nicolas MD  Schedule an appointment as soon as possible for a visit in 1 week  to follow up on your cancer  [address]iz[address] [id number] [phone]         Medications: see computerized discharge medication list  Activity: activity as tolerated  Diet: regular diet   Disposition: SNF  Discharged Condition: Stable      The patient was seen and examined on day of discharge and this discharge summary is in conjunction with any daily progress note from day of discharge. Time spent on discharge is 31 minutes in the examination, evaluation, counseling and review of medications and discharge plan.     Marjorie Corona MD

## 2020-01-31 NOTE — CARE COORDINATION
Call from Roxy Munroe at [facility], pt denied but they stated would approve SNU.  is Roxy Munreo at [phone] [facility]#[id number]. Referral/Message sent to [facility] TAMI at [facility] with contact info.

## 2020-01-31 NOTE — PROGRESS NOTES
Eyal Sterling is a 68 y.o. male on warfarin therapy for DVT. Pharmacy consulted by Dr. Juan Corrales for monitoring and adjustment of treatment. Indication for anticoagulation: hx of DVT (7/2019)  INR goal: 2-3  Warfarin dose prior to admission: 6 mg Mon/Wed/Fri, 4.5 mg all other days     Pertinent Laboratory Values   INR MONITORING  Lab Results   Component Value Date    INR 2.46 01/31/2020    INR 2.85 01/30/2020    INR 3.27 01/29/2020    INR 2.60 01/27/2020    INR 1.14 10/25/2019     Assessment/Plan:   Drug Interactions: ciprofloxacin and metronidazole may increase anticoagulant effects of warfarin   INR previously supra-therapeutic on home regimen, likely due to antibiotic reactions    INR 2.46, therapeutic, on reduced dose warfarin    Warfarin 3 mg tonight      Pharmacy will continue to monitor and adjust warfarin therapy as indicated    Thank you for the consult.   Sandra Guerra, [address]  1/31/2020 11:37 AM

## 2020-02-05 ENCOUNTER — HOSPITAL ENCOUNTER (OUTPATIENT)
Age: 70
Setting detail: SPECIMEN
Discharge: HOME | End: 2020-02-05
Payer: MEDICARE

## 2020-02-05 LAB
INR BLD: 2.53 INDEX
PROTHROMBIN TIME: 30.9 SECONDS (ref 11.7–14.5)

## 2020-02-05 PROCEDURE — 36415 COLL VENOUS BLD VENIPUNCTURE: CPT

## 2020-02-05 PROCEDURE — 85610 PROTHROMBIN TIME: CPT

## 2020-02-07 ENCOUNTER — HOSPITAL ENCOUNTER (OUTPATIENT)
Age: 70
Setting detail: SPECIMEN
Discharge: HOME | End: 2020-02-07

## 2020-02-07 LAB
INR BLD: 2.29 INDEX
PROTHROMBIN TIME: 28 SECONDS (ref 11.7–14.5)

## 2020-02-07 PROCEDURE — 36415 COLL VENOUS BLD VENIPUNCTURE: CPT

## 2020-02-07 PROCEDURE — 85610 PROTHROMBIN TIME: CPT

## 2020-02-11 ENCOUNTER — HOSPITAL ENCOUNTER (OUTPATIENT)
Age: 70
Setting detail: SPECIMEN
Discharge: HOME | End: 2020-02-11

## 2020-02-11 LAB
ANION GAP SERPL CALCULATED.3IONS-SCNC: 13 MMOL/L (ref 4–16)
BUN BLDV-MCNC: 14 MG/DL (ref 6–23)
CALCIUM SERPL-MCNC: 8.6 MG/DL (ref 8.3–10.6)
CHLORIDE BLD-SCNC: 103 MMOL/L (ref 99–110)
CO2: 26 MMOL/L (ref 21–32)
CREAT SERPL-MCNC: 0.8 MG/DL (ref 0.9–1.3)
GFR AFRICAN AMERICAN: >60 ML/MIN/1.73M2
GFR NON-AFRICAN AMERICAN: >60 ML/MIN/1.73M2
GLUCOSE BLD-MCNC: 68 MG/DL (ref 70–99)
HCT VFR BLD CALC: 42.7 % (ref 42–52)
HEMOGLOBIN: 13.1 GM/DL (ref 13.5–18)
INR BLD: 1.61 INDEX
MCH RBC QN AUTO: 31 PG (ref 27–31)
MCHC RBC AUTO-ENTMCNC: 30.7 % (ref 32–36)
MCV RBC AUTO: 101.2 FL (ref 78–100)
PDW BLD-RTO: 13.8 % (ref 11.7–14.9)
PLATELET # BLD: 306 K/CU MM (ref 140–440)
PMV BLD AUTO: 9.3 FL (ref 7.5–11.1)
POTASSIUM SERPL-SCNC: 4.1 MMOL/L (ref 3.5–5.1)
PROTHROMBIN TIME: 19.6 SECONDS (ref 11.7–14.5)
RBC # BLD: 4.22 M/CU MM (ref 4.6–6.2)
SODIUM BLD-SCNC: 142 MMOL/L (ref 135–145)
WBC # BLD: 8.1 K/CU MM (ref 4–10.5)

## 2020-02-11 PROCEDURE — 36415 COLL VENOUS BLD VENIPUNCTURE: CPT

## 2020-02-11 PROCEDURE — 85027 COMPLETE CBC AUTOMATED: CPT

## 2020-02-11 PROCEDURE — 80048 BASIC METABOLIC PNL TOTAL CA: CPT

## 2020-02-11 PROCEDURE — 85610 PROTHROMBIN TIME: CPT

## 2020-02-14 ENCOUNTER — HOSPITAL ENCOUNTER (OUTPATIENT)
Age: 70
Setting detail: SPECIMEN
Discharge: HOME | End: 2020-02-14

## 2020-02-14 LAB
INR BLD: 1.75 INDEX
PROTHROMBIN TIME: 21.3 SECONDS (ref 11.7–14.5)

## 2020-02-14 PROCEDURE — 85610 PROTHROMBIN TIME: CPT

## 2020-02-14 PROCEDURE — 36415 COLL VENOUS BLD VENIPUNCTURE: CPT

## 2020-02-18 ENCOUNTER — HOSPITAL ENCOUNTER (OUTPATIENT)
Age: 70
Setting detail: SPECIMEN
Discharge: HOME | End: 2020-02-18

## 2020-02-18 LAB
ANION GAP SERPL CALCULATED.3IONS-SCNC: 12 MMOL/L (ref 4–16)
BUN BLDV-MCNC: 16 MG/DL (ref 6–23)
CALCIUM SERPL-MCNC: 8.8 MG/DL (ref 8.3–10.6)
CHLORIDE BLD-SCNC: 102 MMOL/L (ref 99–110)
CO2: 27 MMOL/L (ref 21–32)
CREAT SERPL-MCNC: 0.9 MG/DL (ref 0.9–1.3)
GFR AFRICAN AMERICAN: >60 ML/MIN/1.73M2
GFR NON-AFRICAN AMERICAN: >60 ML/MIN/1.73M2
GLUCOSE BLD-MCNC: 65 MG/DL (ref 70–99)
HCT VFR BLD CALC: 41.6 % (ref 42–52)
HEMOGLOBIN: 12.7 GM/DL (ref 13.5–18)
INR BLD: 1.65 INDEX
MCH RBC QN AUTO: 31.1 PG (ref 27–31)
MCHC RBC AUTO-ENTMCNC: 30.5 % (ref 32–36)
MCV RBC AUTO: 101.7 FL (ref 78–100)
PDW BLD-RTO: 13.9 % (ref 11.7–14.9)
PLATELET # BLD: 278 K/CU MM (ref 140–440)
PMV BLD AUTO: 9.2 FL (ref 7.5–11.1)
POTASSIUM SERPL-SCNC: 4.2 MMOL/L (ref 3.5–5.1)
PROTHROMBIN TIME: 20.1 SECONDS (ref 11.7–14.5)
RBC # BLD: 4.09 M/CU MM (ref 4.6–6.2)
SODIUM BLD-SCNC: 141 MMOL/L (ref 135–145)
WBC # BLD: 7.9 K/CU MM (ref 4–10.5)

## 2020-02-18 PROCEDURE — 80048 BASIC METABOLIC PNL TOTAL CA: CPT

## 2020-02-18 PROCEDURE — 85610 PROTHROMBIN TIME: CPT

## 2020-02-18 PROCEDURE — 85027 COMPLETE CBC AUTOMATED: CPT

## 2020-02-18 PROCEDURE — 36415 COLL VENOUS BLD VENIPUNCTURE: CPT

## 2020-02-20 ENCOUNTER — HOSPITAL ENCOUNTER (OUTPATIENT)
Age: 70
Setting detail: SPECIMEN
Discharge: HOME | End: 2020-02-20
Payer: MEDICARE

## 2020-02-20 PROCEDURE — 87077 CULTURE AEROBIC IDENTIFY: CPT

## 2020-02-20 PROCEDURE — 87086 URINE CULTURE/COLONY COUNT: CPT

## 2020-02-20 PROCEDURE — 81001 URINALYSIS AUTO W/SCOPE: CPT

## 2020-02-20 PROCEDURE — 87186 SC STD MICRODIL/AGAR DIL: CPT

## 2020-02-21 ENCOUNTER — HOSPITAL ENCOUNTER (OUTPATIENT)
Dept: CT IMAGING | Age: 70
Discharge: HOME | End: 2020-02-21
Payer: MEDICARE

## 2020-02-21 ENCOUNTER — HOSPITAL ENCOUNTER (OUTPATIENT)
Age: 70
Setting detail: SPECIMEN
Discharge: HOME | End: 2020-02-21

## 2020-02-21 LAB
INR BLD: 1.56 INDEX
PROTHROMBIN TIME: 18.9 SECONDS (ref 11.7–14.5)

## 2020-02-21 PROCEDURE — 70450 CT HEAD/BRAIN W/O DYE: CPT

## 2020-02-21 PROCEDURE — 36415 COLL VENOUS BLD VENIPUNCTURE: CPT

## 2020-02-21 PROCEDURE — 85610 PROTHROMBIN TIME: CPT

## 2020-02-24 LAB
BACTERIA: ABNORMAL /HPF
BILIRUBIN URINE: NEGATIVE MG/DL
BLOOD, URINE: ABNORMAL
CLARITY: ABNORMAL
COLOR: YELLOW
CULTURE: ABNORMAL
CULTURE: ABNORMAL
GLUCOSE, URINE: NEGATIVE MG/DL
HYALINE CASTS: 0 /LPF
KETONES, URINE: NEGATIVE MG/DL
LEUKOCYTE ESTERASE, URINE: ABNORMAL
Lab: ABNORMAL
MUCUS: ABNORMAL HPF
NITRITE URINE, QUANTITATIVE: NEGATIVE
PH, URINE: 5 (ref 5–8)
PROTEIN UA: NEGATIVE MG/DL
RBC URINE: 8 /HPF (ref 0–3)
SPECIFIC GRAVITY UA: 1.02 (ref 1–1.03)
SPECIMEN: ABNORMAL
TOTAL COLONY COUNT: ABNORMAL
TRICHOMONAS: ABNORMAL /HPF
UROBILINOGEN, URINE: NORMAL MG/DL (ref 0.2–1)
WBC CLUMP: ABNORMAL /HPF
WBC UA: 58 /HPF (ref 0–2)

## 2020-02-25 ENCOUNTER — HOSPITAL ENCOUNTER (OUTPATIENT)
Age: 70
Setting detail: SPECIMEN
Discharge: HOME | End: 2020-02-25

## 2020-02-25 LAB
INR BLD: 1.77 INDEX
PROTHROMBIN TIME: 21.6 SECONDS (ref 11.7–14.5)

## 2020-02-25 PROCEDURE — 85610 PROTHROMBIN TIME: CPT

## 2020-02-25 PROCEDURE — 36415 COLL VENOUS BLD VENIPUNCTURE: CPT

## 2020-03-02 ENCOUNTER — HOSPITAL ENCOUNTER (INPATIENT)
Age: 70
LOS: 7 days | Discharge: HOME | DRG: 054 | End: 2020-03-09
Attending: EMERGENCY MEDICINE | Admitting: HOSPITALIST
Payer: MEDICARE

## 2020-03-02 ENCOUNTER — APPOINTMENT (OUTPATIENT)
Dept: GENERAL RADIOLOGY | Age: 70
DRG: 054 | End: 2020-03-02
Payer: MEDICARE

## 2020-03-02 PROBLEM — R77.8 ELEVATED TROPONIN LEVEL: Status: ACTIVE | Noted: 2020-03-02

## 2020-03-02 PROBLEM — R53.1 GENERALIZED WEAKNESS: Status: ACTIVE | Noted: 2020-03-02

## 2020-03-02 PROBLEM — R79.89 ELEVATED TROPONIN LEVEL: Status: ACTIVE | Noted: 2020-03-02

## 2020-03-02 LAB
ALBUMIN SERPL-MCNC: 3.3 GM/DL (ref 3.4–5)
ALP BLD-CCNC: 101 IU/L (ref 40–129)
ALT SERPL-CCNC: 78 U/L (ref 10–40)
ANION GAP SERPL CALCULATED.3IONS-SCNC: 13 MMOL/L (ref 4–16)
APTT: 54.7 SECONDS (ref 25.1–37.1)
AST SERPL-CCNC: 83 IU/L (ref 15–37)
BACTERIA: ABNORMAL /HPF
BASOPHILS ABSOLUTE: 0 K/CU MM
BASOPHILS RELATIVE PERCENT: 0.3 % (ref 0–1)
BILIRUB SERPL-MCNC: 0.5 MG/DL (ref 0–1)
BILIRUBIN URINE: NEGATIVE MG/DL
BLOOD, URINE: ABNORMAL
BUN BLDV-MCNC: 18 MG/DL (ref 6–23)
CALCIUM SERPL-MCNC: 9 MG/DL (ref 8.3–10.6)
CHLORIDE BLD-SCNC: 99 MMOL/L (ref 99–110)
CLARITY: ABNORMAL
CO2: 27 MMOL/L (ref 21–32)
COLOR: YELLOW
CREAT SERPL-MCNC: 0.8 MG/DL (ref 0.9–1.3)
DIFFERENTIAL TYPE: ABNORMAL
EOSINOPHILS ABSOLUTE: 0.1 K/CU MM
EOSINOPHILS RELATIVE PERCENT: 0.7 % (ref 0–3)
GFR AFRICAN AMERICAN: >60 ML/MIN/1.73M2
GFR NON-AFRICAN AMERICAN: >60 ML/MIN/1.73M2
GLUCOSE BLD-MCNC: 85 MG/DL (ref 70–99)
GLUCOSE, URINE: NEGATIVE MG/DL
HCT VFR BLD CALC: 41.9 % (ref 42–52)
HEMOGLOBIN: 13.5 GM/DL (ref 13.5–18)
IMMATURE NEUTROPHIL %: 0.3 % (ref 0–0.43)
INR BLD: 2.22 INDEX
INR BLD: 2.45 INDEX
KETONES, URINE: ABNORMAL MG/DL
LEUKOCYTE ESTERASE, URINE: NEGATIVE
LYMPHOCYTES ABSOLUTE: 1.2 K/CU MM
LYMPHOCYTES RELATIVE PERCENT: 12.2 % (ref 24–44)
MCH RBC QN AUTO: 31.7 PG (ref 27–31)
MCHC RBC AUTO-ENTMCNC: 32.2 % (ref 32–36)
MCV RBC AUTO: 98.4 FL (ref 78–100)
MONOCYTES ABSOLUTE: 1 K/CU MM
MONOCYTES RELATIVE PERCENT: 10.8 % (ref 0–4)
MUCUS: ABNORMAL HPF
NITRITE URINE, QUANTITATIVE: NEGATIVE
NUCLEATED RBC %: 0 %
PDW BLD-RTO: 14.3 % (ref 11.7–14.9)
PH, URINE: 5 (ref 5–8)
PLATELET # BLD: 265 K/CU MM (ref 140–440)
PMV BLD AUTO: 9.3 FL (ref 7.5–11.1)
POTASSIUM SERPL-SCNC: 3.8 MMOL/L (ref 3.5–5.1)
PRO-BNP: 55.79 PG/ML
PROTEIN UA: 30 MG/DL
PROTHROMBIN TIME: 27.1 SECONDS (ref 11.7–14.5)
PROTHROMBIN TIME: 29.9 SECONDS (ref 11.7–14.5)
RBC # BLD: 4.26 M/CU MM (ref 4.6–6.2)
RBC URINE: 16 /HPF (ref 0–3)
SEGMENTED NEUTROPHILS ABSOLUTE COUNT: 7.1 K/CU MM
SEGMENTED NEUTROPHILS RELATIVE PERCENT: 75.7 % (ref 36–66)
SODIUM BLD-SCNC: 139 MMOL/L (ref 135–145)
SPECIFIC GRAVITY UA: 1.02 (ref 1–1.03)
TOTAL IMMATURE NEUTOROPHIL: 0.03 K/CU MM
TOTAL NUCLEATED RBC: 0 K/CU MM
TOTAL PROTEIN: 6.7 GM/DL (ref 6.4–8.2)
TRICHOMONAS: ABNORMAL /HPF
TROPONIN T: 0.04 NG/ML
TROPONIN T: 0.06 NG/ML
UROBILINOGEN, URINE: NORMAL MG/DL (ref 0.2–1)
WBC # BLD: 9.4 K/CU MM (ref 4–10.5)
WBC UA: <1 /HPF (ref 0–2)

## 2020-03-02 PROCEDURE — 71045 X-RAY EXAM CHEST 1 VIEW: CPT

## 2020-03-02 PROCEDURE — 85610 PROTHROMBIN TIME: CPT

## 2020-03-02 PROCEDURE — 93005 ELECTROCARDIOGRAM TRACING: CPT | Performed by: PHYSICIAN ASSISTANT

## 2020-03-02 PROCEDURE — 80053 COMPREHEN METABOLIC PANEL: CPT

## 2020-03-02 PROCEDURE — 81001 URINALYSIS AUTO W/SCOPE: CPT

## 2020-03-02 PROCEDURE — 93010 ELECTROCARDIOGRAM REPORT: CPT | Performed by: INTERNAL MEDICINE

## 2020-03-02 PROCEDURE — 6370000000 HC RX 637 (ALT 250 FOR IP): Performed by: NURSE PRACTITIONER

## 2020-03-02 PROCEDURE — 83880 ASSAY OF NATRIURETIC PEPTIDE: CPT

## 2020-03-02 PROCEDURE — 84484 ASSAY OF TROPONIN QUANT: CPT

## 2020-03-02 PROCEDURE — 1200000000 HC SEMI PRIVATE

## 2020-03-02 PROCEDURE — 85025 COMPLETE CBC W/AUTO DIFF WBC: CPT

## 2020-03-02 PROCEDURE — 99285 EMERGENCY DEPT VISIT HI MDM: CPT

## 2020-03-02 PROCEDURE — 85730 THROMBOPLASTIN TIME PARTIAL: CPT

## 2020-03-02 RX ORDER — PROMETHAZINE HYDROCHLORIDE 25 MG/1
12.5 TABLET ORAL EVERY 6 HOURS PRN
Status: DISCONTINUED | OUTPATIENT
Start: 2020-03-02 | End: 2020-03-09 | Stop reason: HOSPADM

## 2020-03-02 RX ORDER — SODIUM CHLORIDE 0.9 % (FLUSH) 0.9 %
10 SYRINGE (ML) INJECTION PRN
Status: DISCONTINUED | OUTPATIENT
Start: 2020-03-02 | End: 2020-03-09 | Stop reason: HOSPADM

## 2020-03-02 RX ORDER — ONDANSETRON 2 MG/ML
4 INJECTION INTRAMUSCULAR; INTRAVENOUS EVERY 6 HOURS PRN
Status: DISCONTINUED | OUTPATIENT
Start: 2020-03-02 | End: 2020-03-09 | Stop reason: HOSPADM

## 2020-03-02 RX ORDER — GABAPENTIN 300 MG/1
300 CAPSULE ORAL 3 TIMES DAILY
Status: DISCONTINUED | OUTPATIENT
Start: 2020-03-02 | End: 2020-03-09 | Stop reason: HOSPADM

## 2020-03-02 RX ORDER — DEXAMETHASONE 2 MG/1
2 TABLET ORAL
Status: DISCONTINUED | OUTPATIENT
Start: 2020-03-03 | End: 2020-03-04

## 2020-03-02 RX ORDER — POLYETHYLENE GLYCOL 3350 17 G/17G
17 POWDER, FOR SOLUTION ORAL DAILY PRN
Status: DISCONTINUED | OUTPATIENT
Start: 2020-03-02 | End: 2020-03-02

## 2020-03-02 RX ORDER — WARFARIN SODIUM 5 MG/1
5 TABLET ORAL
COMMUNITY
End: 2020-03-18 | Stop reason: CLARIF

## 2020-03-02 RX ORDER — DOXYCYCLINE HYCLATE 100 MG
100 TABLET ORAL 2 TIMES DAILY
Status: DISCONTINUED | OUTPATIENT
Start: 2020-03-02 | End: 2020-03-09 | Stop reason: HOSPADM

## 2020-03-02 RX ORDER — ACETAMINOPHEN 650 MG/1
650 SUPPOSITORY RECTAL EVERY 6 HOURS PRN
Status: DISCONTINUED | OUTPATIENT
Start: 2020-03-02 | End: 2020-03-09 | Stop reason: HOSPADM

## 2020-03-02 RX ORDER — WARFARIN SODIUM 5 MG/1
5 TABLET ORAL
Status: DISCONTINUED | OUTPATIENT
Start: 2020-03-02 | End: 2020-03-04 | Stop reason: DRUGHIGH

## 2020-03-02 RX ORDER — MIRTAZAPINE 15 MG/1
45 TABLET, FILM COATED ORAL NIGHTLY
Status: DISCONTINUED | OUTPATIENT
Start: 2020-03-02 | End: 2020-03-09 | Stop reason: HOSPADM

## 2020-03-02 RX ORDER — SODIUM CHLORIDE 0.9 % (FLUSH) 0.9 %
10 SYRINGE (ML) INJECTION EVERY 12 HOURS SCHEDULED
Status: DISCONTINUED | OUTPATIENT
Start: 2020-03-02 | End: 2020-03-09 | Stop reason: HOSPADM

## 2020-03-02 RX ORDER — VITAMIN B COMPLEX
1 CAPSULE ORAL DAILY
Status: DISCONTINUED | OUTPATIENT
Start: 2020-03-02 | End: 2020-03-09 | Stop reason: HOSPADM

## 2020-03-02 RX ORDER — POLYETHYLENE GLYCOL 3350 17 G/17G
17 POWDER, FOR SOLUTION ORAL DAILY PRN
Status: DISCONTINUED | OUTPATIENT
Start: 2020-03-02 | End: 2020-03-02 | Stop reason: SDUPTHER

## 2020-03-02 RX ORDER — POLYETHYLENE GLYCOL 3350 17 G/17G
17 POWDER, FOR SOLUTION ORAL DAILY PRN
Status: DISCONTINUED | OUTPATIENT
Start: 2020-03-02 | End: 2020-03-09 | Stop reason: HOSPADM

## 2020-03-02 RX ORDER — POLYETHYLENE GLYCOL 3350 17 G/17G
17 POWDER, FOR SOLUTION ORAL DAILY PRN
COMMUNITY

## 2020-03-02 RX ORDER — DOXYCYCLINE HYCLATE 100 MG/1
100 CAPSULE ORAL 2 TIMES DAILY
COMMUNITY

## 2020-03-02 RX ORDER — LACTOBACILLUS RHAMNOSUS GG 10B CELL
1 CAPSULE ORAL EVERY MORNING
Status: DISCONTINUED | OUTPATIENT
Start: 2020-03-03 | End: 2020-03-09 | Stop reason: HOSPADM

## 2020-03-02 RX ORDER — ACETAMINOPHEN 500 MG
500 TABLET ORAL EVERY 4 HOURS PRN
Status: DISCONTINUED | OUTPATIENT
Start: 2020-03-02 | End: 2020-03-09 | Stop reason: HOSPADM

## 2020-03-02 RX ORDER — SENNA AND DOCUSATE SODIUM 50; 8.6 MG/1; MG/1
1 TABLET, FILM COATED ORAL PRN
Status: DISCONTINUED | OUTPATIENT
Start: 2020-03-02 | End: 2020-03-09 | Stop reason: HOSPADM

## 2020-03-02 RX ORDER — DRONABINOL 2.5 MG/1
5 CAPSULE ORAL
Status: DISCONTINUED | OUTPATIENT
Start: 2020-03-03 | End: 2020-03-09 | Stop reason: HOSPADM

## 2020-03-02 RX ORDER — ACETAMINOPHEN 325 MG/1
650 TABLET ORAL EVERY 6 HOURS PRN
Status: DISCONTINUED | OUTPATIENT
Start: 2020-03-02 | End: 2020-03-02

## 2020-03-02 RX ORDER — PETROLATUM,WHITE/LANOLIN
OINTMENT (GRAM) TOPICAL 2 TIMES DAILY
Status: DISCONTINUED | OUTPATIENT
Start: 2020-03-02 | End: 2020-03-09 | Stop reason: HOSPADM

## 2020-03-02 RX ADMIN — GABAPENTIN 300 MG: 300 CAPSULE ORAL at 21:04

## 2020-03-02 RX ADMIN — LEVETIRACETAM 750 MG: 500 TABLET, FILM COATED ORAL at 21:05

## 2020-03-02 RX ADMIN — WARFARIN SODIUM 5 MG: 5 TABLET ORAL at 21:05

## 2020-03-02 RX ADMIN — LANOLIN, PETROLATUM: 15.5; 53.4 OINTMENT TOPICAL at 21:05

## 2020-03-02 RX ADMIN — DOXYCYCLINE HYCLATE 100 MG: 100 TABLET, COATED ORAL at 21:04

## 2020-03-02 RX ADMIN — Medication 1 CAPSULE: at 21:05

## 2020-03-02 RX ADMIN — Medication: at 21:06

## 2020-03-02 RX ADMIN — MIRTAZAPINE 45 MG: 15 TABLET, FILM COATED ORAL at 21:04

## 2020-03-02 NOTE — PROGRESS NOTES
Medication History  [facility]    Patient Name: Grzegorz Sherman 1950     Medication history has been completed by: Raul Storm CPhT    Source(s) of information: Medication list sent from [facility]     Primary Care Physician: Michel Talbert MD     Pharmacy:  Medication list sent from [address] [place] [address] as of 03/02/2020 - Review Complete 03/02/2020   Allergen Reaction Noted    Niacin and related Hives     Furosemide  06/13/2019        Prior to Admission medications    Medication Sig Start Date End Date Taking? Authorizing Provider   doxycycline hyclate (VIBRAMYCIN) 100 MG capsule Take 100 mg by mouth 2 times daily   Yes Historical Provider, MD   polyethylene glycol (GLYCOLAX) powder Take 17 g by mouth daily as needed   Yes Historical Provider, MD   warfarin (COUMADIN) 5 MG tablet Take 5 mg by mouth Daily with supper   Yes Historical Provider, MD   dexamethasone (DECADRON) 1 MG tablet Take 2 mg by mouth daily (with breakfast)    Yes Historical Provider, MD   dronabinol (MARINOL) 5 MG capsule Take 5 mg by mouth 2 times daily (before meals). Yes Historical Provider, MD   acetaminophen (TYLENOL) 500 MG tablet Take 500 mg by mouth every 4 hours as needed for Pain   Yes Historical Provider, MD   levETIRAcetam (KEPPRA) 750 MG tablet Take 1 tablet by mouth 2 times daily 7/29/19  Yes Haleigh Quach MD   gabapentin (NEURONTIN) 300 MG capsule Take 300 mg by mouth 3 times daily.    Yes Historical Provider, MD   mirtazapine (REMERON) 15 MG tablet Take 45 mg by mouth nightly    Yes Historical Provider, MD   b complex vitamins capsule Take 1 capsule by mouth daily   Yes Historical Provider, MD   senna-docusate (PERICOLACE) 8.6-50 MG per tablet Take 1 tablet by mouth as needed    Yes Historical Provider, MD   Lactobacillus (PROBIOTIC ACIDOPHILUS PO) Take 1 capsule by mouth every morning Over The Counter    Yes Historical Provider, MD   polyethylene glycol (GLYCOLAX) packet Take 17 g

## 2020-03-02 NOTE — H&P
History and Physical  DAVID Rivas-BC   Internal Medicine Hospitalist      Name:  Wilver Lam /Age/Sex: 1950  (68 y.o. male)   MRN & CSN:  [id number] & [id number] Admission Date/Time: 3/2/2020 12:41 PM   Location:  03/03TR-03 PCP: Debo Richey MD       Hospital Day: 1      Supervising Physician: Dr. Emanuel Silva      Chief Complaint: Fatigue (started Friday )     Assessment and Plan:   Wilver Lam is a 68 y.o.  male who presents with Generalized weakness      Generalized weakness/fatigue - likely 2/2 bladder cancer that metastasized to the brain, s/p cystectomy.  Brain left parietal tumor - s/p full brain radiation bru2019, gamma knife surgery 2019         - admit for observation         - f/u with oncology, Dr. Mallory Dunham         - case management consulted for placement         - PT/OT eval and treat          - supportive care management         - palliative care consulted     Elevated troponin level - denied chest pain, initial trop 0.041 but has been elevated from previous data. - trend trop       - c/w tele monitoring          HTN, uncontrolled       - not on any hypertensive mesd       - prn hydralazine with BP parameters       - monitor BP trends     Right LE DVT with 2019 s/p IVC filter - on Warfarin, daily PT/INR.  H/o MRSA, UTI - c/w oral Vibramycin, contact isolation.  Seizures - c/w Keppra, seizure precautions. Current diagnosis and plan of management discussed with the patient and wife at the time of admission in lay language who agree to the above plan and disposition of admission for further care. All concerns and questions addressed. Patient assessment and plan in conjunction with supervising physician - Dr. Amarilis Lee    DVT Prophylaxis [] Lovenox, []  Heparin, [] SCDs, [] Ambulation  [] Long term [facility] [place], operated by [facility]  Patient is on Coumadin.    GI Prophylaxis [] PPI,  [] H2 Blocker,  [] Carafate,  [x] Diet,  [x] No GI pleural effusion or pneumothorax. The cardiomediastinal silhouette is stable. No overt pulmonary edema. No acute osseous abnormality. Subtle bibasilar opacities likely atelectasis.      EKG this visit:   EKG: Normal sinus rhythm, rate 76   Incomplete right bundle branch block   Borderline ECG   When compared with ECG of 03-OCT-2019 19:55,   Incomplete right bundle branch block is now present     Current Treatment Team:  Treatment Team: Attending Provider: Lyndsey Michele MD; Registered Nurse: Bessie Payan RN; Registered Nurse: Brittaney Alonso RN; Physician Assistant: PAULINE Phillips; Consulting Physician: MD Imtiaz Pugh APRN-BC   [facility]  3/2/2020 5:16 PM      Electronically signed by DAVID Tenorio CNP on 3/2/2020 at 5:16 PM

## 2020-03-02 NOTE — ED PROVIDER NOTES
EMERGENCY DEPARTMENT ENCOUNTER      PCP: Verna Zarate MD    [address] [facility]    Chief Complaint   Patient presents with    Fatigue     started Friday        Of note, this patient was also evaluated by the attending physician, Yuli Chang. HPI    Laura Kingsley is a 68 y.o. male who presents with neurolyse fatigue and weakness. Significant other is at bedside. Patient is of Dr. Kerry Miller and Lara Aburto here today with increased weakness fatigue. Significant other said that this started on Friday and is been continuous worse. Patient has a history of bladder cancer with metastasis to the brain he is not currently using chemo or radiation. He sees Dr. Lara Aburto is his oncologist.  He is currently being treated for urinary tract infection and MRSA.   He is here for further evaluation treatment options        REVIEW OF SYSTEMS    Constitutional:  Denies fever, chills, weight loss or weakness   HENT:  Denies sore throat or ear pain   Cardiovascular:  Denies chest pain, palpitations   Respiratory:  Denies cough or shortness of breath    GI:  Denies abdominal pain, nausea, vomiting, or diarrhea  :  Denies any urinary symptoms   Musculoskeletal:  Denies back pain  Skin:  Denies rash  Neurologic:  Denies headache, focal weakness or sensory changes   Endocrine:  Denies polyuria or polydypsia   Lymphatic:  Denies swollen glands     All other review of systems are negative  See HPI and nursing notes for additional information     PAST MEDICAL AND SURGICAL HISTORY    Past Medical History:   Diagnosis Date    Arthritis     \"Neck\"    Cancer ([facility] [address]     \"found I have rare form of bladder cancer and seeing Dr Jesus العلي for chemo    Diverticulitis Dx In 2000    \"One Episode\"    History of urostomy     Hypertension     Nocturia     Shortness of breath on exertion     Sleep apnea     No CPAP \"I Use A Mouth Piece\"    Teeth missing     Upper And Lower    Urinary frequency     Wears glasses     [place] teeth extracted     4 [place] Teeth Extracted In Past     Past Surgical History:   Procedure Laterality Date    BRAIN SURGERY      gamma knife surgery left parietal    COLONOSCOPY  Last Done In 2017    Polyps Removed In Past    CYSTOSCOPY  05/15/2018    Done At Dr. Shari Greer Right Early 2050'A    ENDOSCOPY, COLON, DIAGNOSTIC  2000    EYE SURGERY Bilateral 1983    \"Radial Kerotomy\"    OTHER SURGICAL HISTORY  05/21/2018    turbt    TUNNELED VENOUS PORT PLACEMENT Right 06/26/2018    DR Anuj Hernadez, [facility]#[id number]    VASECTOMY  1982    WISDOM TOOTH EXTRACTION      4 [place] Teeth Extracted In Past       CURRENT MEDICATIONS    Current Outpatient Rx   Medication Sig Dispense Refill    doxycycline hyclate (VIBRAMYCIN) 100 MG capsule Take 100 mg by mouth 2 times daily      polyethylene glycol (GLYCOLAX) powder Take 17 g by mouth daily as needed      warfarin (COUMADIN) 5 MG tablet Take 5 mg by mouth Daily with supper      dexamethasone (DECADRON) 1 MG tablet Take 2 mg by mouth daily (with breakfast)       dronabinol (MARINOL) 5 MG capsule Take 5 mg by mouth 2 times daily (before meals).  acetaminophen (TYLENOL) 500 MG tablet Take 500 mg by mouth every 4 hours as needed for Pain      levETIRAcetam (KEPPRA) 750 MG tablet Take 1 tablet by mouth 2 times daily 60 tablet 3    gabapentin (NEURONTIN) 300 MG capsule Take 300 mg by mouth 3 times daily.       mirtazapine (REMERON) 15 MG tablet Take 45 mg by mouth nightly       b complex vitamins capsule Take 1 capsule by mouth daily      senna-docusate (PERICOLACE) 8.6-50 MG per tablet Take 1 tablet by mouth as needed       Lactobacillus (PROBIOTIC ACIDOPHILUS PO) Take 1 capsule by mouth every morning Over The Counter       Vitamins A & D (VITAMIN A & D) ointment Apply topically 2 times daily 10/03/19 Apply topically to gluteal area every shift      menthol-zinc oxide (CALMOSEPTINE) 0.44-20.625 % OINT ointment Apply topically daily 1/27/2020 Apply to right heel daily      UNABLE TO FIND Take by mouth every morning Over The Counter One Daily Men's  Multivitamin         ALLERGIES    Allergies   Allergen Reactions    Niacin And Related Hives    Furosemide        SOCIAL AND FAMILY HISTORY    Social History     Socioeconomic History    Marital status: Life Partner     Spouse name: None    Number of children: None    Years of education: None    Highest education level: None   Occupational History    None   Social Needs    Financial resource strain: None    Food insecurity:     Worry: None     Inability: None    Transportation needs:     Medical: None     Non-medical: None   Tobacco Use    Smoking status: Former Smoker     Packs/day: 2.00     Years: 11.00     Pack years: 22.00     Types: Cigarettes     Start date:      Last attempt to quit:      Years since quittin.1    Smokeless tobacco: Never Used   Substance and Sexual Activity    Alcohol use: Yes     Comment: \"A Couple Times A Year\"    Drug use: No    Sexual activity: Not Currently   Lifestyle    Physical activity:     Days per week: None     Minutes per session: None    Stress: None   Relationships    Social connections:     Talks on phone: None     Gets together: None     Attends Religion service: None     Active member of club or organization: None     Attends meetings of clubs or organizations: None     Relationship status: None    Intimate partner violence:     Fear of current or ex partner: None     Emotionally abused: None     Physically abused: None     Forced sexual activity: None   Other Topics Concern    None   Social History Narrative    None     Family History   Problem Relation Age of Onset    Alzheimer's Disease Mother     Other Brother         Acid Reflux         PHYSICAL EXAM    VITAL SIGNS: BP (!) 166/90   Pulse 78   Temp 98.7 °F (37.1 °C) (Oral)   Resp 14   Ht 5' 11\" (1.803 m)   Wt 180 lb (81.6 kg)   SpO2 99%   BMI 25.10 kg/m²    Constitutional:  Well developed, Well

## 2020-03-03 ENCOUNTER — APPOINTMENT (OUTPATIENT)
Dept: CT IMAGING | Age: 70
DRG: 054 | End: 2020-03-03
Payer: MEDICARE

## 2020-03-03 ENCOUNTER — APPOINTMENT (OUTPATIENT)
Dept: MRI IMAGING | Age: 70
DRG: 054 | End: 2020-03-03
Payer: MEDICARE

## 2020-03-03 LAB
ALBUMIN SERPL-MCNC: 3.4 GM/DL (ref 3.4–5)
ALP BLD-CCNC: 113 IU/L (ref 40–129)
ALT SERPL-CCNC: 69 U/L (ref 10–40)
AST SERPL-CCNC: 58 IU/L (ref 15–37)
BILIRUB SERPL-MCNC: 0.5 MG/DL (ref 0–1)
BILIRUBIN DIRECT: 0.2 MG/DL (ref 0–0.3)
BILIRUBIN, INDIRECT: 0.3 MG/DL (ref 0–0.7)
INR BLD: 2.39 INDEX
PROTHROMBIN TIME: 29.2 SECONDS (ref 11.7–14.5)
TOTAL PROTEIN: 5.9 GM/DL (ref 6.4–8.2)
TROPONIN T: 0.06 NG/ML

## 2020-03-03 PROCEDURE — 97110 THERAPEUTIC EXERCISES: CPT

## 2020-03-03 PROCEDURE — 97530 THERAPEUTIC ACTIVITIES: CPT

## 2020-03-03 PROCEDURE — 94761 N-INVAS EAR/PLS OXIMETRY MLT: CPT

## 2020-03-03 PROCEDURE — 97166 OT EVAL MOD COMPLEX 45 MIN: CPT

## 2020-03-03 PROCEDURE — 70553 MRI BRAIN STEM W/O & W/DYE: CPT

## 2020-03-03 PROCEDURE — 36415 COLL VENOUS BLD VENIPUNCTURE: CPT

## 2020-03-03 PROCEDURE — 97535 SELF CARE MNGMENT TRAINING: CPT

## 2020-03-03 PROCEDURE — 99223 1ST HOSP IP/OBS HIGH 75: CPT | Performed by: INTERNAL MEDICINE

## 2020-03-03 PROCEDURE — 6370000000 HC RX 637 (ALT 250 FOR IP): Performed by: NURSE PRACTITIONER

## 2020-03-03 PROCEDURE — 2580000003 HC RX 258: Performed by: HOSPITALIST

## 2020-03-03 PROCEDURE — 2580000003 HC RX 258: Performed by: NURSE PRACTITIONER

## 2020-03-03 PROCEDURE — 1200000000 HC SEMI PRIVATE

## 2020-03-03 PROCEDURE — 99222 1ST HOSP IP/OBS MODERATE 55: CPT | Performed by: OBSTETRICS & GYNECOLOGY

## 2020-03-03 PROCEDURE — 84484 ASSAY OF TROPONIN QUANT: CPT

## 2020-03-03 PROCEDURE — 80076 HEPATIC FUNCTION PANEL: CPT

## 2020-03-03 PROCEDURE — 6360000004 HC RX CONTRAST MEDICATION: Performed by: HOSPITALIST

## 2020-03-03 PROCEDURE — A9579 GAD-BASE MR CONTRAST NOS,1ML: HCPCS | Performed by: HOSPITALIST

## 2020-03-03 PROCEDURE — 97163 PT EVAL HIGH COMPLEX 45 MIN: CPT

## 2020-03-03 PROCEDURE — 85610 PROTHROMBIN TIME: CPT

## 2020-03-03 PROCEDURE — 6360000002 HC RX W HCPCS: Performed by: NURSE PRACTITIONER

## 2020-03-03 PROCEDURE — 74176 CT ABD & PELVIS W/O CONTRAST: CPT

## 2020-03-03 RX ORDER — 0.9 % SODIUM CHLORIDE 0.9 %
10 VIAL (ML) INJECTION
Status: COMPLETED | OUTPATIENT
Start: 2020-03-03 | End: 2020-03-03

## 2020-03-03 RX ADMIN — Medication 1 CAPSULE: at 09:34

## 2020-03-03 RX ADMIN — SODIUM CHLORIDE, PRESERVATIVE FREE 10 ML: 5 INJECTION INTRAVENOUS at 17:03

## 2020-03-03 RX ADMIN — DRONABINOL 5 MG: 2.5 CAPSULE ORAL at 09:33

## 2020-03-03 RX ADMIN — GABAPENTIN 300 MG: 300 CAPSULE ORAL at 09:33

## 2020-03-03 RX ADMIN — GABAPENTIN 300 MG: 300 CAPSULE ORAL at 13:40

## 2020-03-03 RX ADMIN — MIRTAZAPINE 45 MG: 15 TABLET, FILM COATED ORAL at 20:53

## 2020-03-03 RX ADMIN — HYDRALAZINE HYDROCHLORIDE 10 MG: 20 INJECTION INTRAMUSCULAR; INTRAVENOUS at 03:17

## 2020-03-03 RX ADMIN — DRONABINOL 5 MG: 2.5 CAPSULE ORAL at 17:31

## 2020-03-03 RX ADMIN — Medication: at 09:34

## 2020-03-03 RX ADMIN — DOXYCYCLINE HYCLATE 100 MG: 100 TABLET, COATED ORAL at 09:33

## 2020-03-03 RX ADMIN — LEVETIRACETAM 750 MG: 500 TABLET, FILM COATED ORAL at 09:33

## 2020-03-03 RX ADMIN — SODIUM CHLORIDE, PRESERVATIVE FREE 10 ML: 5 INJECTION INTRAVENOUS at 09:35

## 2020-03-03 RX ADMIN — DOXYCYCLINE HYCLATE 100 MG: 100 TABLET, COATED ORAL at 20:53

## 2020-03-03 RX ADMIN — DEXAMETHASONE 2 MG: 2 TABLET ORAL at 09:33

## 2020-03-03 RX ADMIN — WARFARIN SODIUM 5 MG: 5 TABLET ORAL at 17:31

## 2020-03-03 RX ADMIN — GABAPENTIN 300 MG: 300 CAPSULE ORAL at 20:53

## 2020-03-03 RX ADMIN — Medication 1 CAPSULE: at 09:33

## 2020-03-03 RX ADMIN — GADOTERIDOL 18 ML: 279.3 INJECTION, SOLUTION INTRAVENOUS at 17:03

## 2020-03-03 RX ADMIN — LEVETIRACETAM 750 MG: 500 TABLET, FILM COATED ORAL at 20:53

## 2020-03-03 RX ADMIN — SODIUM CHLORIDE, PRESERVATIVE FREE 10 ML: 5 INJECTION INTRAVENOUS at 20:53

## 2020-03-03 RX ADMIN — LANOLIN, PETROLATUM: 15.5; 53.4 OINTMENT TOPICAL at 09:34

## 2020-03-03 ASSESSMENT — PAIN SCALES - GENERAL
PAINLEVEL_OUTOF10: 0
PAINLEVEL_OUTOF10: 3

## 2020-03-03 ASSESSMENT — PAIN DESCRIPTION - LOCATION: LOCATION: HEAD

## 2020-03-03 NOTE — PROGRESS NOTES
Patient had CT of abdomen and pelvis today which showed severe colonic diverticulosis with no definite findings of metastatic dz in ab pelvis. Urostomy has had good output. Patient denies any pain. PT/OT working with patient today. Palliative care and oncology consulted. Patient able to make needs known, call light within reach, plan of care continues.

## 2020-03-03 NOTE — PROGRESS NOTES
Physical Therapy    Facility/Department: [facility]N  Initial Assessment    NAME: Donnie Posadas  : 1950  MRN: [id number]    Date of Service: 3/3/2020    Discharge Recommendations:  Continue to assess pending progress, Subacute/Skilled Nursing Facility   PT Equipment Recommendations  Equipment Needed: No    Assessment   Body structures, Functions, Activity limitations: Decreased functional mobility ; Decreased balance;Decreased ADL status; Decreased ROM; Decreased endurance;Decreased strength;Decreased sensation;Decreased fine motor control;Decreased coordination  Assessment: P is 67 yo male who presents with hx oc bladder cancer and mets to brain with significant deficits in strength, sensation, balance and overall reduced safety with functional mobility. Recommend skilled PT to address deficits and maximize functional potential.  Treatment Diagnosis: impaired strength, sensation, balance and mobility  Prognosis: Good  Decision Making: High Complexity  History: see below  Exam: assessed strength, ROM, balance, mobility  Clinical Presentation: unstable, high fall risk  PT Education: PT Role;Plan of Care;General Safety  Barriers to Learning: none, difficulty with word finding  REQUIRES PT FOLLOW UP: Yes  Activity Tolerance  Activity Tolerance: Patient limited by fatigue;Patient Tolerated treatment well       Patient Diagnosis(es): The primary encounter diagnosis was General weakness. Diagnoses of Fatigue, unspecified type, Generalized weakness, and Elevated troponin were also pertinent to this visit. has a past medical history of Arthritis, Cancer ([address] Diverticulitis, History of urostomy, Hypertension, Nocturia, Shortness of breath on exertion, Sleep apnea, Teeth missing, Urinary frequency, Wears glasses, and [place] teeth extracted. has a past surgical history that includes Elbow surgery (Right, Early ); eye surgery (Bilateral, ); [place] tooth extraction; Cystoscopy (05/15/2018);  Colonoscopy (Last Done In 2017); Endoscopy, colon, diagnostic (2000); Vasectomy (1982); other surgical history (05/21/2018); Tunneled venous port placement (Right, 06/26/2018); and brain surgery.     Restrictions  Restrictions/Precautions  Restrictions/Precautions: Contact Precautions  Required Braces or Orthoses?: No  Vision/Hearing  Vision: Impaired  Vision Exceptions: Wears glasses at all times  Hearing: Exceptions to [facility]  Hearing Exceptions: Hard of hearing/hearing concerns     Subjective  General  Chart Reviewed: Yes  Patient assessed for rehabilitation services?: Yes  Family / Caregiver Present: No  General Comment  Comments: P with history of brain mets and tumors resulting in RLE hemiparesis and numbness  Subjective  Subjective: \"I have been feeling better\"  Pain Screening  Patient Currently in Pain: Yes  Pain Assessment  Pain Assessment: 0-10  Pain Level: 3(not constant)  Pain Location: Head  Vital Signs  Patient Currently in Pain: Yes       Orientation  Orientation  Overall Orientation Status: Within Functional Limits  Social/Functional History  Social/Functional History  Lives With: Significant other  Type of Home: House  Home Layout: One level, Laundry in basement  Home Access: Stairs to enter with rails, Ramped entrance  Entrance Stairs - Number of Steps: 5  Entrance Stairs - Rails: Both  Bathroom Shower/Tub: Tub/Shower unit, Walk-in shower(may have shower chair)  Bathroom Toilet: Handicap height  Bathroom Equipment: Grab bars in shower  Home Equipment: Rolling walker, Ofe Plan bed(STEDY)  Receives Help From: Friend(s), Family  ADL Assistance: Needs assistance  Homemaking Assistance: Needs assistance  Homemaking Responsibilities: No(attempted to set up from  level)  Ambulation Assistance: Needs assistance  Transfer Assistance: Needs assistance  Active : No  Occupation: Retired  Type of occupation:   Leisure & Hobbies: reading, watching sports  Additional Comments: P was

## 2020-03-03 NOTE — PROGRESS NOTES
Occupational Therapy    LTAC, located within [facility] ACUTE CARE OCCUPATIONAL THERAPY EVALUATION  Roger Hernandez, 1950, 4104/4104-A, 3/3/2020    History  [place]:  The primary encounter diagnosis was General weakness. Diagnoses of Fatigue, unspecified type, Generalized weakness, and Elevated troponin were also pertinent to this visit. Patient  has a past medical history of Arthritis, Cancer ([address] Diverticulitis, History of urostomy, Hypertension, Nocturia, Shortness of breath on exertion, Sleep apnea, Teeth missing, Urinary frequency, Wears glasses, and [place] teeth extracted. Patient  has a past surgical history that includes Elbow surgery (Right, Early 1980's); eye surgery (Bilateral, 1983); [place] tooth extraction; Cystoscopy (05/15/2018); Colonoscopy (Last Done In 2017); Endoscopy, colon, diagnostic (2000); Vasectomy (1982); other surgical history (05/21/2018); Tunneled venous port placement (Right, 06/26/2018); and brain surgery. Subjective:  Patient states:  \"I was only able to stand weakly with the physical therapist this morning\".     Pain:  No.    Communication with other providers:  Handoff to RN  Restrictions: MRSA Contact Precautions, General Precautions, Fall Risk    Home Setup/Prior level of function  Social/Functional History  Lives With: Significant other  Type of Home: House  Home Layout: One level, Laundry in basement  Home Access: Stairs to enter with rails, Ramped entrance  [address] - Number of Steps: 5  Entrance Stairs - Rails: Both  Bathroom Shower/Tub: Tub/Shower unit, Walk-in shower(may have shower chair)  Bathroom Toilet: Handicap height  Bathroom Equipment: Grab bars in [address] [place]: Rolling walker, [address] bed(STEDY)  Receives Help From: Friend(s), Family  ADL Assistance: Needs assistance  Homemaking Assistance: Needs assistance  Homemaking Responsibilities: No(attempted to set up from  level)  Ambulation Assistance: Needs assistance  Transfer Assistance: Needs assistance  Active : No  Occupation: Retired  Type of occupation:   Leisure & Hobbies: reading, watching sports  Additional Comments: P was recently a patient in [facility] for rehab    Examination of body systems (includes body structures/functions, activity/participation limitations):  · Observation:  Supine in bed upon arrival, wife in room, agreeable to therapy, RLE foot w/ increased edema  · Vision:  Glasses  · [facility] [place]  · Cardiopulmonary:  No 02 needs      Body Systems and functions:  · ROM R/L:  WFL w/ increased effort/time   · Strength R/L:  4-/5,   · Sensation: Nueropathy in fingers and toes  · Tone: Normal  · Coordination: WFL  · Perception: WNL    Activities of Daily Living (ADLs):  · Feeding: Todd  · Grooming: SBA (oral care sitting EOB, washing face/hands sitting EOB)  · UB bathing: SBA (washing abdomen, trunk, BUE arms sitting EOB)  · LB bathing: maxA (able to wash upper legs sitting EOB, assistance to wash lower legs sitting EOB, RLE weaker associated, unable to cross legs, washed buttocks/lyudmila area in stand w/ assistance)  · UB dressing: SBA (doffing/donning gown sitting EOB)  · LB dressing: dependent/total (threading pants off of buttocks in stand, assistance to thread over toes in sit, assistance to don over toes up to knees in sit, assistance to thread over buttocks in stand)  · Toileting: dependent/total (see LB bathing/dressing for details, pt needed to suddenly use restroom, requested BSC. Stand pivot from EOB on L side of pt maxA. STS to wash lyudmila area/buttocks. Pt fatigued and then stand pivot from [facility] to EOB maxA)    Cognitive and Psychosocial Functioning:  · Overall cognitive status: WNL  · Affect: Normal        Mobility:  · Supine to sit:  maxA  · Transfers: modA EOB up to stand, maxA stand pivot due to weakness in RLE  · Sitting balance:  SBA ~10 minutes. · Standing balance:  Dony.   · Toilet Transfers: maxA EOB to BSC x 2    · Sit to supine: maxA   · Scooting to HOB: Dony pt using BUE arms to pull up to [address] Daily Activity Inpatient   How much help for putting on and taking off regular lower body clothing?: Total  How much help for Bathing?: A Lot  How much help for Toileting?: Total  How much help for putting on and taking off regular upper body clothing?: A Little  How much help for taking care of personal grooming?: A Little  How much help for eating meals?: None  AM-PAC Inpatient Daily Activity Raw Score: 14  AM-PAC Inpatient ADL T-Scale Score : 33.39  ADL Inpatient CMS 0-100% Score: 59.67  ADL Inpatient CMS G-Code Modifier : CK    Treatment:  Self Care Training:   Cues were given for safety, sequence, UE/LE placement, visual cues, and balance. Activities performed today included UB/LB bathing/dressing, toileting, grooming    Therapeutic Activity Training:   Therapeutic activity training was instructed today. Cues were given for safety, sequence, UE/LE placement, awareness, and balance. Activities performed today included bed mobility training, sup-sit, sit-stand x 4, stand pivot x 2, sit to supine, scooting to [facility]      Safety: patient left in bed with bed alarm and wife in room, call light within reach, RN notified, gait belt used. Assessment:  Pt is a 69 yo male admitted from SNF for generalized weakness. Pt currently presents w/ deficits in ADL and high level IADL independence, functional activity tolerance, dynamic sitting and standing balance and tolerance and functional transfers, BUE strength and weakness in RLE. Pt would benefit from continued acute care OT services w/ discharge to SNF  Complexity: Moderate  Prognosis: Good, no significant barriers to participation at this time.    Plan  Times per week: 2x+  Times per day: Daily  Current Treatment Recommendations: Strengthening, Functional Mobility Training, Patient/Caregiver Education & Training, Home Management Training, Endurance Training, Pain Management,

## 2020-03-03 NOTE — CONSULTS
REVIEW OF SYSTEMS:    As above. No acute pain. The remainder of ROS is unremarkable.     PHYSICAL EXAM:      Vitals:    BP (!) 158/74   Pulse 80   Temp 98.2 °F (36.8 °C) (Oral)   Resp 14   Ht 5' 11\" (1.803 m)   Wt 199 lb 6.4 oz (90.4 kg)   SpO2 96%   BMI 27.81 kg/m²     CONSTITUTIONAL:  fatigued, alert, cooperative and no apparent distress  EYES:  extra-ocular muscles intact and sclera clear  NECK:  supple, symmetrical, trachea midline and no lymphadenopathy  LUNGS:  clear to auscultation, no crackles or wheezing  CARDIOVASCULAR:  normal S1 and S2 and no murmur noted  ABDOMEN:  normal bowel sounds, soft, non-distended and non-tender  MUSCULOSKELETAL:  Decreased power to all extremities, there is no redness, warmth, or swelling of the joints  NEUROLOGIC:  Cranial Nerves:  cranial nerves II-XII are grossly intact  SKIN:  Bruises noted, dry skin and no jaundice  DATA:    Labs:  General Labs:    CBC with Differential:    Lab Results   Component Value Date    WBC 9.4 03/02/2020    RBC 4.26 03/02/2020    HGB 13.5 03/02/2020    HCT 41.9 03/02/2020     03/02/2020    MCV 98.4 03/02/2020    MCH 31.7 03/02/2020    MCHC 32.2 03/02/2020    RDW 14.3 03/02/2020    SEGSPCT 75.7 03/02/2020    BANDSPCT 3 01/16/2020    LYMPHOPCT 12.2 03/02/2020    MONOPCT 10.8 03/02/2020    BASOPCT 0.3 03/02/2020    MONOSABS 1.0 03/02/2020    LYMPHSABS 1.2 03/02/2020    EOSABS 0.1 03/02/2020    BASOSABS 0.0 03/02/2020    DIFFTYPE AUTOMATED DIFFERENTIAL 03/02/2020     CMP:    Lab Results   Component Value Date     03/02/2020    K 3.8 03/02/2020    CL 99 03/02/2020    CO2 27 03/02/2020    BUN 18 03/02/2020    CREATININE 0.8 03/02/2020    GFRAA >60 03/02/2020    LABGLOM >60 03/02/2020    GLUCOSE 85 03/02/2020    PROT 5.9 03/03/2020    LABALBU 3.4 03/03/2020    CALCIUM 9.0 03/02/2020    BILITOT 0.5 03/03/2020    ALKPHOS 113 03/03/2020    AST 58 03/03/2020    ALT 69 03/03/2020     CXR :  Subtle bibasilar opacities likely atelectasis. IMPRESSION/RECOMMENDATIONS:      1. 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 Overall his disease is stable. Last imaging study did not show progression of disease. He is on avastin for RT induced brain necrosis. It is held now. He may have 2 more treatment. I don't plan to treat unless he gets stronger. 2. Deconditioning. PT/OT evaluation. 3. On antibiotic coverage. Agree to have palliative care. Thanks. Will follow up.

## 2020-03-03 NOTE — DISCHARGE INSTR - COC
Continuity of Care Form    Patient Name: Melany Keith   :  1950  MRN:  [id number]    Admit date:  3/2/2020  Discharge date:  3/9/2020    Code Status Order: DNR-CCA   Advance Directives:   [facility] Documentation     Date/Time Healthcare Directive Type of Healthcare Directive Copy in [address] Agent's Name Healthcare Agent's Phone Number    [phone]  Yes, patient has an advance directive for healthcare treatment  Durable power of  for health care;Living will  Yes, copy in chart  --  --  --          Admitting Physician:  Daniel Barreto MD  PCP: Carlos Healy MD    Discharging Nurse: [facility] Unit/Room#: 4104/4104-A  Discharging Unit Phone Number: [phone]    Emergency Contact:   Extended Emergency Contact Information  Primary Emergency Contact: [facility] Phone: [phone]  Mobile Phone: [phone]  Relation: Domestic Partner  Secondary Emergency Contact: [address] [place] [address] Phone: [phone]  Mobile Phone: [phone]  Relation: Child    Past Surgical History:  Past Surgical History:   Procedure Laterality Date    BRAIN SURGERY      gamma knife surgery left parietal    COLONOSCOPY  Last Done In 2017    Polyps Removed In Past    CYSTOSCOPY  05/15/2018    Done At Dr. Barbi Tam Right Early [address]    ENDOSCOPY, COLON, DIAGNOSTIC  2000    EYE SURGERY Bilateral     \"Radial Kerotomy\"    OTHER SURGICAL HISTORY  2018    turbt    TUNNELED VENOUS PORT PLACEMENT Right 2018    DR Jeff Clemons, BDN#[id number]    VASECTOMY  1982    WISDOM TOOTH EXTRACTION      4 [place] Teeth Extracted In Past       Immunization History:   Immunization History   Administered Date(s) Administered    Influenza, Quadv, IM, PF (6 mo and older Fluzone, Flulaval, Fluarix, and 3 yrs and older Afluria) 2020       Active Problems:  Patient Active Problem List   Diagnosis Code    Bladder mass N32.89    Acute metabolic encephalopathy K69.85    Hypotension I95.9    Severe malnutrition (HCC) E43    Bladder cancer (HCC) C67.9    Abdominal pain R10.9    Debility R53.81    Generalized weakness R53.1    Elevated troponin level R79.89       Isolation/Infection:   Isolation          Contact        Patient Infection Status     Infection Onset Added Last Indicated Last Indicated By Review Planned Expiration Resolved Resolved By    MRSA 02/20/20 02/24/20 02/20/20 Culture, Urine        MDRO (multi-drug resistant organism)  08/26/19 08/26/19 Uyen Carrasco RN        8/20/19 Enterobacter cloacae urine          Nurse Assessment:  Last Vital Signs: BP (!) 140/81   Pulse 71   Temp 97.8 °F (36.6 °C) (Oral)   Resp 16   Ht 5' 11\" (1.803 m)   Wt 199 lb 6.4 oz (90.4 kg)   SpO2 94%   BMI 27.81 kg/m²     Last documented pain score (0-10 scale): Pain Level: 3(not constant)  Last Weight:   Wt Readings from Last 1 Encounters:   03/03/20 199 lb 6.4 oz (90.4 kg)     Mental Status:  oriented and alert    IV Access:  - None    Nursing Mobility/ADLs:  Walking   Assisted  Transfer  Assisted  Bathing  Assisted  Dressing  Assisted  Toileting  Assisted  Feeding  Independent  Med Admin  Assisted  Med Delivery   whole    Wound Care Documentation and Therapy:        Elimination:  Continence:   · Bowel: Yes  · Bladder: Urostomy  Urinary Catheter: Urostomy changed 3/8/2020   Colostomy/Ileostomy/Ileal Conduit: No  Urostomy RLQ-Stomal Appliance: 1 piece  Urostomy RLQ-Stoma  Assessment: Pink  Urostomy RLQ-Peristomal Assessment: Clean, Intact    Date of Last BM: 3/8/2020    Intake/Output Summary (Last 24 hours) at 3/3/2020 1120  Last data filed at 3/3/2020 1030  Gross per 24 hour   Intake --   Output 350 ml   Net -350 ml     No intake/output data recorded.       Patient's personal belongings (please select all that are sent with patient):  Chiqui    RN SIGNATURE:  Electronically signed by Haley Coleman RN on 3/9/20 at 3:16 PM

## 2020-03-03 NOTE — CONSULTS
Palliative Medicine Consultation    Reason for Consult:      _X____ Advance Care Planning  _X____Transition of Care Planning  _X____ Psychosocial Support  _____ Symptom Management:     Recommendations:    1. Continue with the excellent care of this patient with metastatic bladder cancer to the brain. 2.  Patient has advance directives and a POA. 3.  Not interested in hospice at this time. He has spoken to them in the past and his late wife had hospice when she  7 years ago. Requesting Physician:  Dr. Tim Hutchison:  Fatigue and weakness    History Obtained From:  patient, electronic medical record    HISTORY OF PRESENT ILLNESS:    68year old male who presented to the ED with generalized weakness and fatigue. Patient with past medical history significant for small cell bladder cancer diagnosed in 2018 status post chemotherapy and cystectomy with urostomy. Patient developed brain metastasis to left parietal area and underwent WBRT and SBRT to brain lesion in 2019. Patient developed a right DVT and an IVC filter was placed in 2019. The patient has had multiple recent admissions for the same symptoms. Palliative Care was asked to see the patient for goals of care and family support.               Past Medical History:        Diagnosis Date    Arthritis     \"Neck\"    Cancer [address] [address]     \"found I have rare form of bladder cancer and seeing Dr Morgan Jefferson for chemo    Diverticulitis Dx In     \"One Episode\"    History of urostomy     Hypertension     Nocturia     Shortness of breath on exertion     Sleep apnea     No CPAP \"I Use A Mouth Piece\"    Teeth missing     Upper And Lower    Urinary frequency     Wears glasses     [place] teeth extracted     4 [place] Teeth Extracted In Past       Past Surgical History:        Procedure Laterality Date    BRAIN SURGERY      gamma knife surgery left parietal    COLONOSCOPY  Last Done In 2017    Polyps Removed In Past    CYSTOSCOPY 03/03/2020    AST 58 03/03/2020    ALT 69 03/03/2020     Albumin:    Lab Results   Component Value Date    LABALBU 3.4 03/03/2020     CT ABD/Pelvis: Pending    IMPRESSION:    68year old male with metastatic small cell bladder cancer to the brain for Palliative Care encounter. I spoke with the patient regarding his goals and he just \"wants to be able to see everyone that he wants to see. \"  It made him sad to think that he would not see friends and family. The patient does have advance directives and his POA is his significant other, Daniela Jewell. We discussed end of life options and he is not interested in hospice at this time stating \"it's not the right time. \"  I asked him if he knew what the \"right time\" might look like and he was unsure. For now he wants to keep things as they are. He denies any significant pain but does have some constipation for which he is ordered medication. We will continue to follow for goals of care and family support.       Electronically signed by Alfa Harris MD on 3/3/2020 at 9:46 AM

## 2020-03-03 NOTE — PROGRESS NOTES
03/03/20 0930   BP: (!) 140/81   Pulse: 71   Resp: 16   Temp: 97.8 °F (36.6 °C)   SpO2: 94%     Physical Exam:   Gen:  awake, alert, cooperative, no apparent distress  Head/Eyes:  Normocephalic atraumatic, EOMI   NECK:   symmetrical, trachea midline  LUNGS: Normal Effort   CARDIOVASCULAR:  Normal rate  ABDOMEN: Non tender, non distended, no HSM noted. MUSCULOSKELETAL:  ROM limited  NEUROLOGIC: Alert and Oriented,  Cranial nerves II-XII are grossly intact.    SKIN:  no bruising or bleeding, normal skin color,  no redness      Data:       CBC   Recent Labs     03/02/20  1253   WBC 9.4   HGB 13.5   HCT 41.9*         BMP   Recent Labs     03/02/20  1253      K 3.8   CL 99   CO2 27   BUN 18   CREATININE 0.8*         Electronically signed by Solis Chamberlain MD on 3/3/2020 at 10:12 AM

## 2020-03-04 LAB
INR BLD: 3.01 INDEX
PROTHROMBIN TIME: 36.8 SECONDS (ref 11.7–14.5)

## 2020-03-04 PROCEDURE — 94761 N-INVAS EAR/PLS OXIMETRY MLT: CPT

## 2020-03-04 PROCEDURE — 85610 PROTHROMBIN TIME: CPT

## 2020-03-04 PROCEDURE — 6370000000 HC RX 637 (ALT 250 FOR IP): Performed by: HOSPITALIST

## 2020-03-04 PROCEDURE — 99231 SBSQ HOSP IP/OBS SF/LOW 25: CPT | Performed by: NURSE PRACTITIONER

## 2020-03-04 PROCEDURE — 2580000003 HC RX 258: Performed by: NURSE PRACTITIONER

## 2020-03-04 PROCEDURE — 1200000000 HC SEMI PRIVATE

## 2020-03-04 PROCEDURE — 6360000002 HC RX W HCPCS: Performed by: FAMILY MEDICINE

## 2020-03-04 PROCEDURE — 36415 COLL VENOUS BLD VENIPUNCTURE: CPT

## 2020-03-04 PROCEDURE — 6370000000 HC RX 637 (ALT 250 FOR IP): Performed by: NURSE PRACTITIONER

## 2020-03-04 RX ORDER — DEXAMETHASONE SODIUM PHOSPHATE 4 MG/ML
4 INJECTION, SOLUTION INTRA-ARTICULAR; INTRALESIONAL; INTRAMUSCULAR; INTRAVENOUS; SOFT TISSUE EVERY 8 HOURS
Status: DISCONTINUED | OUTPATIENT
Start: 2020-03-04 | End: 2020-03-04

## 2020-03-04 RX ORDER — DEXAMETHASONE SODIUM PHOSPHATE 4 MG/ML
4 INJECTION, SOLUTION INTRA-ARTICULAR; INTRALESIONAL; INTRAMUSCULAR; INTRAVENOUS; SOFT TISSUE EVERY 6 HOURS
Status: DISCONTINUED | OUTPATIENT
Start: 2020-03-04 | End: 2020-03-04

## 2020-03-04 RX ORDER — DEXAMETHASONE SODIUM PHOSPHATE 4 MG/ML
4 INJECTION, SOLUTION INTRA-ARTICULAR; INTRALESIONAL; INTRAMUSCULAR; INTRAVENOUS; SOFT TISSUE EVERY 8 HOURS
Status: DISCONTINUED | OUTPATIENT
Start: 2020-03-04 | End: 2020-03-07

## 2020-03-04 RX ADMIN — DEXAMETHASONE 2 MG: 2 TABLET ORAL at 08:32

## 2020-03-04 RX ADMIN — SODIUM CHLORIDE, PRESERVATIVE FREE 10 ML: 5 INJECTION INTRAVENOUS at 20:09

## 2020-03-04 RX ADMIN — Medication 1 CAPSULE: at 08:32

## 2020-03-04 RX ADMIN — GABAPENTIN 300 MG: 300 CAPSULE ORAL at 13:59

## 2020-03-04 RX ADMIN — LANOLIN, PETROLATUM: 15.5; 53.4 OINTMENT TOPICAL at 08:30

## 2020-03-04 RX ADMIN — Medication: at 08:31

## 2020-03-04 RX ADMIN — LEVETIRACETAM 750 MG: 500 TABLET, FILM COATED ORAL at 08:31

## 2020-03-04 RX ADMIN — DRONABINOL 5 MG: 2.5 CAPSULE ORAL at 16:42

## 2020-03-04 RX ADMIN — DOXYCYCLINE HYCLATE 100 MG: 100 TABLET, COATED ORAL at 20:09

## 2020-03-04 RX ADMIN — SODIUM CHLORIDE, PRESERVATIVE FREE 10 ML: 5 INJECTION INTRAVENOUS at 08:32

## 2020-03-04 RX ADMIN — GABAPENTIN 300 MG: 300 CAPSULE ORAL at 20:09

## 2020-03-04 RX ADMIN — GABAPENTIN 300 MG: 300 CAPSULE ORAL at 08:32

## 2020-03-04 RX ADMIN — WARFARIN SODIUM 3 MG: 2 TABLET ORAL at 18:03

## 2020-03-04 RX ADMIN — LEVETIRACETAM 750 MG: 500 TABLET, FILM COATED ORAL at 20:09

## 2020-03-04 RX ADMIN — LANOLIN, PETROLATUM: 15.5; 53.4 OINTMENT TOPICAL at 16:42

## 2020-03-04 RX ADMIN — DOXYCYCLINE HYCLATE 100 MG: 100 TABLET, COATED ORAL at 08:32

## 2020-03-04 RX ADMIN — DEXAMETHASONE SODIUM PHOSPHATE 4 MG: 4 INJECTION, SOLUTION INTRAMUSCULAR; INTRAVENOUS at 16:42

## 2020-03-04 RX ADMIN — MIRTAZAPINE 45 MG: 15 TABLET, FILM COATED ORAL at 20:09

## 2020-03-04 RX ADMIN — DRONABINOL 5 MG: 2.5 CAPSULE ORAL at 08:32

## 2020-03-04 ASSESSMENT — PAIN SCALES - GENERAL
PAINLEVEL_OUTOF10: 0

## 2020-03-04 NOTE — PROGRESS NOTES
Hospitalist Progress Note      Name:  Grzegorz Sherman /Age/Sex: 1950  (68 y.o. male)   MRN & CSN:  [id number] & [id number] Admission Date/Time: 3/2/2020 12:41 PM   Location:  [address] [place] [address] PCP: Michel Talbert MD       Grzegorz Sherman is a 68 y.o.  male  who presents with Fatigue (started Friday )      Assessment and Plan:   Generalized weakness/fatigue - likely 2/2 bladder cancer with mets to brain,  - pt/ot for rehab needs  - MRI brain: increased size of paramedian high left parietal mass, surrounding vasogenic edema noted  - will start IV steroids and hold is oral for now  - oncology following  - palliative care consulted         Elevated troponin level - denied chest pain, initial trop 0.041 but has been elevated from previous data. - trend trop  - c/w tele monitoring      HTN, uncontrolled  -stable now  - stable now  - monitor and if needs will add meds         Right LE DVT with 2019 s/p IVC filter - on Warfarin, daily PT/INR.  H/o MRSA, UTI - c/w oral Vibramycin, contact isolation.    Seizures - c/w Keppra, seizure precautions.               Diet DIET GENERAL;   Code Status DNR-CCA     Medications:   Medications:    b complex vitamins  1 capsule Oral Daily    dexamethasone  2 mg Oral Daily with breakfast    doxycycline hyclate  100 mg Oral BID    dronabinol  5 mg Oral BID AC    gabapentin  300 mg Oral TID    lactobacillus  1 capsule Oral QAM    levETIRAcetam  750 mg Oral BID    warfarin  5 mg Oral Dinner    vitamin A & D   Topical BID    mirtazapine  45 mg Oral Nightly    sodium chloride flush  10 mL Intravenous 2 times per day    zinc oxide   Topical Daily      Infusions:   PRN Meds: acetaminophen, 500 mg, Q4H PRN  sennosides-docusate sodium, 1 tablet, PRN  sodium chloride flush, 10 mL, PRN  acetaminophen, 650 mg, Q6H PRN  promethazine, 12.5 mg, Q6H PRN    Or  ondansetron, 4 mg, Q6H PRN  polyethylene glycol, 17 g, Daily PRN      Subjective:     Doing ok, states hand is a bit

## 2020-03-04 NOTE — PROGRESS NOTES
Patient was up to chair today and ambulated to the bathroom with two assist and walker several times today. Results from MRI showed increased of left parietal mass. IV steroids started instead of oral. VSS. Patient was confused this am and oriented only to person and date, but as day progressed patient became more oriented and is currently A&O x 4. Urostomy has good output. Patient is able to make needs known, call light within reach, plan of care continues.

## 2020-03-05 LAB
INR BLD: 3.4 INDEX
PROTHROMBIN TIME: 41.7 SECONDS (ref 11.7–14.5)

## 2020-03-05 PROCEDURE — 36415 COLL VENOUS BLD VENIPUNCTURE: CPT

## 2020-03-05 PROCEDURE — 1200000000 HC SEMI PRIVATE

## 2020-03-05 PROCEDURE — 85610 PROTHROMBIN TIME: CPT

## 2020-03-05 PROCEDURE — 6370000000 HC RX 637 (ALT 250 FOR IP): Performed by: FAMILY MEDICINE

## 2020-03-05 PROCEDURE — 99232 SBSQ HOSP IP/OBS MODERATE 35: CPT | Performed by: INTERNAL MEDICINE

## 2020-03-05 PROCEDURE — 6370000000 HC RX 637 (ALT 250 FOR IP): Performed by: NURSE PRACTITIONER

## 2020-03-05 PROCEDURE — 6360000002 HC RX W HCPCS: Performed by: FAMILY MEDICINE

## 2020-03-05 PROCEDURE — 2580000003 HC RX 258: Performed by: NURSE PRACTITIONER

## 2020-03-05 RX ORDER — HYDROCHLOROTHIAZIDE 25 MG/1
25 TABLET ORAL DAILY
Status: DISCONTINUED | OUTPATIENT
Start: 2020-03-05 | End: 2020-03-08

## 2020-03-05 RX ADMIN — LEVETIRACETAM 750 MG: 500 TABLET, FILM COATED ORAL at 21:13

## 2020-03-05 RX ADMIN — SODIUM CHLORIDE, PRESERVATIVE FREE 10 ML: 5 INJECTION INTRAVENOUS at 09:09

## 2020-03-05 RX ADMIN — LANOLIN, PETROLATUM: 15.5; 53.4 OINTMENT TOPICAL at 16:45

## 2020-03-05 RX ADMIN — DEXAMETHASONE SODIUM PHOSPHATE 4 MG: 4 INJECTION, SOLUTION INTRAMUSCULAR; INTRAVENOUS at 15:21

## 2020-03-05 RX ADMIN — DRONABINOL 5 MG: 2.5 CAPSULE ORAL at 16:44

## 2020-03-05 RX ADMIN — Medication 1 CAPSULE: at 09:09

## 2020-03-05 RX ADMIN — DEXAMETHASONE SODIUM PHOSPHATE 4 MG: 4 INJECTION, SOLUTION INTRAMUSCULAR; INTRAVENOUS at 06:13

## 2020-03-05 RX ADMIN — GABAPENTIN 300 MG: 300 CAPSULE ORAL at 20:20

## 2020-03-05 RX ADMIN — MIRTAZAPINE 45 MG: 15 TABLET, FILM COATED ORAL at 20:20

## 2020-03-05 RX ADMIN — DOXYCYCLINE HYCLATE 100 MG: 100 TABLET, COATED ORAL at 20:20

## 2020-03-05 RX ADMIN — LANOLIN, PETROLATUM: 15.5; 53.4 OINTMENT TOPICAL at 09:10

## 2020-03-05 RX ADMIN — DRONABINOL 5 MG: 2.5 CAPSULE ORAL at 06:13

## 2020-03-05 RX ADMIN — LEVETIRACETAM 750 MG: 500 TABLET, FILM COATED ORAL at 09:09

## 2020-03-05 RX ADMIN — GABAPENTIN 300 MG: 300 CAPSULE ORAL at 09:09

## 2020-03-05 RX ADMIN — HYDROCHLOROTHIAZIDE 25 MG: 25 TABLET ORAL at 12:14

## 2020-03-05 RX ADMIN — GABAPENTIN 300 MG: 300 CAPSULE ORAL at 15:21

## 2020-03-05 RX ADMIN — DEXAMETHASONE SODIUM PHOSPHATE 4 MG: 4 INJECTION, SOLUTION INTRAMUSCULAR; INTRAVENOUS at 00:07

## 2020-03-05 RX ADMIN — DEXAMETHASONE SODIUM PHOSPHATE 4 MG: 4 INJECTION, SOLUTION INTRAMUSCULAR; INTRAVENOUS at 22:26

## 2020-03-05 RX ADMIN — Medication: at 09:10

## 2020-03-05 RX ADMIN — DOXYCYCLINE HYCLATE 100 MG: 100 TABLET, COATED ORAL at 09:09

## 2020-03-05 ASSESSMENT — PAIN SCALES - GENERAL
PAINLEVEL_OUTOF10: 0

## 2020-03-05 NOTE — PROGRESS NOTES
Palliative Care RN visit. Patient is awake and alert. Listening to an audio book till his wife arrives. His speech much better today. Able to retrieve wording when having issues. We had a gage conversation about their many travels. Waiting on pre-cert for return to .

## 2020-03-05 NOTE — PROGRESS NOTES
ONCOLOGY HEMATOLOGY CARE ([facility])  PROGRESS NOTE      Patient was seen and examined today. Not in any acute distress and no overnight events. He looks and feels better today. No new complaints. Will call wife to discuss MRI report. I recommend to follow up with Dr Amrit Reyes at [facility] as outpatient. To continue with sterodi. PHYSICAL EXAM    Vitals: BP (!) 168/77 Comment: macie rn was noitfted  Pulse 61   Temp 97.3 °F (36.3 °C) (Oral)   Resp 16   Ht 5' 11\" (1.803 m)   Wt 185 lb 1.6 oz (84 kg)   SpO2 97%   BMI 25.82 kg/m²   CONSTITUTIONAL: awake, alert, cooperative, no apparent distress   EYES: pupils equal, round and reactive to light, sclera clear and conjunctiva normal  ENT: Normocephalic, without obvious abnormality, atraumatic  NECK: supple, symmetrical, no jugular venous distension and no carotid bruits   HEMATOLOGIC/LYMPHATIC: no cervical, supraclavicular or axillary lymphadenopathy   LUNGS: no increased work of breathing and clear to auscultation   CARDIOVASCULAR: regular rate and rhythm, normal S1 and S2, no murmur noted  ABDOMEN: normal bowel sounds x 4, soft, non-distended, non-tender, no masses palpated, no hepatosplenomgaly   MUSCULOSKELETAL: full range of motion noted, decreased power. NEUROLOGIC: awake, alert, oriented to name, place and time. SKIN: No jaundice. Dry skin. Abrasion noted.    EXTREMITIES: no LE edema     LABORATORY RESULTS  CBC:   Recent Labs     03/02/20  1253   WBC 9.4   HGB 13.5        BMP:    Recent Labs     03/02/20  1253      K 3.8   CL 99   CO2 27   BUN 18   CREATININE 0.8*   GLUCOSE 85     Hepatic:   Recent Labs     03/02/20  1253 03/03/20  0518   AST 83* 58*   ALT 78* 69*   BILITOT 0.5 0.5   ALKPHOS 101 113     INR:   Recent Labs     03/02/20  2113 03/03/20  0518 03/04/20  0551   INR 2.22 2.39 3.01       RADIOLOGY REPORTS  CT scan of the chest  CT scan of the abdomen & pelvis  CT scan of the head  Bone scan    ASSESSMENT/RECOMMENDATION    1.

## 2020-03-05 NOTE — PROGRESS NOTES
PRN  promethazine, 12.5 mg, Q6H PRN    Or  ondansetron, 4 mg, Q6H PRN  polyethylene glycol, 17 g, Daily PRN      Subjective:     Doing well  Objective: Intake/Output Summary (Last 24 hours) at 3/5/2020 1042  Last data filed at 3/5/2020 0446  Gross per 24 hour   Intake 200 ml   Output 1025 ml   Net -825 ml      Vitals:   Vitals:    03/05/20 0852   BP: (!) 188/83   Pulse: 68   Resp: 16   Temp: 97.8 °F (36.6 °C)   SpO2:      Physical Exam:   Gen:  awake, alert, cooperative, no apparent distress  Head/Eyes:  Normocephalic atraumatic, EOMI   NECK:   symmetrical, trachea midline  LUNGS: Normal Effort   CARDIOVASCULAR:  Normal rate  ABDOMEN: Non tender, non distended, no HSM noted. MUSCULOSKELETAL:  ROM limited  NEUROLOGIC: Alert and Oriented,  Cranial nerves II-XII are grossly intact.    SKIN:  no bruising or bleeding, normal skin color,  no redness      Data:       CBC   Recent Labs     03/02/20  1253   WBC 9.4   HGB 13.5   HCT 41.9*         BMP   Recent Labs     03/02/20  1253      K 3.8   CL 99   CO2 27   BUN 18   CREATININE 0.8*         Electronically signed by Bobby Downs MD on 3/5/2020 at 10:42 AM

## 2020-03-05 NOTE — PROGRESS NOTES
Dashawn Cummins is a 68 y.o. male on warfarin therapy for anticoagualtion/metastases . Pharmacy consulted by Dr. Robe Alaniz, DAVID-CNP for monitoring and adjustment of treatment. Indication for anticoagulation: Anticoagulation due to cancer  INR goal: 2-3  Warfarin dose prior to admission: 5mg qd    INR MONITORING  Lab Results   Component Value Date    INR 3.01 03/04/2020    INR 2.39 03/03/2020    INR 2.22 03/02/2020    INR 2.45 03/02/2020    INR 1.77 02/25/2020     Assessment/Plan:  Drug Interactions: doxyxycline  Goal INR:  2-3  Current INR = 3.4  supra-therapeutic. Will hold Warfarin for now. Restart when INR <3.   Pharmacy will continue to monitor and adjust warfarin therapy as indicated    Thank you for the consult.   Baylee Lugo, [address]  3/5/2020  10:08 AM

## 2020-03-05 NOTE — CARE COORDINATION
Per Yany Valentin at [address] [employer] [facility] still pending. Per [facility] requesting updated PT/OT- placed whiteboard note.

## 2020-03-06 LAB
INR BLD: 3.55 INDEX
PROTHROMBIN TIME: 43.5 SECONDS (ref 11.7–14.5)

## 2020-03-06 PROCEDURE — 6370000000 HC RX 637 (ALT 250 FOR IP): Performed by: FAMILY MEDICINE

## 2020-03-06 PROCEDURE — 2580000003 HC RX 258: Performed by: NURSE PRACTITIONER

## 2020-03-06 PROCEDURE — 97530 THERAPEUTIC ACTIVITIES: CPT

## 2020-03-06 PROCEDURE — 6360000002 HC RX W HCPCS: Performed by: FAMILY MEDICINE

## 2020-03-06 PROCEDURE — 99231 SBSQ HOSP IP/OBS SF/LOW 25: CPT | Performed by: NURSE PRACTITIONER

## 2020-03-06 PROCEDURE — 94761 N-INVAS EAR/PLS OXIMETRY MLT: CPT

## 2020-03-06 PROCEDURE — 6370000000 HC RX 637 (ALT 250 FOR IP): Performed by: NURSE PRACTITIONER

## 2020-03-06 PROCEDURE — 85610 PROTHROMBIN TIME: CPT

## 2020-03-06 PROCEDURE — 97535 SELF CARE MNGMENT TRAINING: CPT

## 2020-03-06 PROCEDURE — 1200000000 HC SEMI PRIVATE

## 2020-03-06 PROCEDURE — 97116 GAIT TRAINING THERAPY: CPT

## 2020-03-06 RX ORDER — AMLODIPINE BESYLATE 5 MG/1
5 TABLET ORAL DAILY
Status: DISCONTINUED | OUTPATIENT
Start: 2020-03-06 | End: 2020-03-07

## 2020-03-06 RX ADMIN — DRONABINOL 5 MG: 2.5 CAPSULE ORAL at 15:18

## 2020-03-06 RX ADMIN — LEVETIRACETAM 750 MG: 500 TABLET, FILM COATED ORAL at 09:17

## 2020-03-06 RX ADMIN — Medication 1 CAPSULE: at 09:17

## 2020-03-06 RX ADMIN — DOXYCYCLINE HYCLATE 100 MG: 100 TABLET, COATED ORAL at 20:05

## 2020-03-06 RX ADMIN — LANOLIN, PETROLATUM: 15.5; 53.4 OINTMENT TOPICAL at 15:22

## 2020-03-06 RX ADMIN — LANOLIN, PETROLATUM: 15.5; 53.4 OINTMENT TOPICAL at 10:18

## 2020-03-06 RX ADMIN — GABAPENTIN 300 MG: 300 CAPSULE ORAL at 09:17

## 2020-03-06 RX ADMIN — GABAPENTIN 300 MG: 300 CAPSULE ORAL at 20:06

## 2020-03-06 RX ADMIN — PROMETHAZINE HYDROCHLORIDE 12.5 MG: 25 TABLET ORAL at 20:05

## 2020-03-06 RX ADMIN — DEXAMETHASONE SODIUM PHOSPHATE 4 MG: 4 INJECTION, SOLUTION INTRAMUSCULAR; INTRAVENOUS at 20:05

## 2020-03-06 RX ADMIN — GABAPENTIN 300 MG: 300 CAPSULE ORAL at 14:04

## 2020-03-06 RX ADMIN — SODIUM CHLORIDE, PRESERVATIVE FREE 10 ML: 5 INJECTION INTRAVENOUS at 15:19

## 2020-03-06 RX ADMIN — Medication: at 10:19

## 2020-03-06 RX ADMIN — AMLODIPINE BESYLATE 5 MG: 5 TABLET ORAL at 12:29

## 2020-03-06 RX ADMIN — DOXYCYCLINE HYCLATE 100 MG: 100 TABLET, COATED ORAL at 09:17

## 2020-03-06 RX ADMIN — DRONABINOL 5 MG: 2.5 CAPSULE ORAL at 06:12

## 2020-03-06 RX ADMIN — HYDROCHLOROTHIAZIDE 25 MG: 25 TABLET ORAL at 09:17

## 2020-03-06 RX ADMIN — MIRTAZAPINE 45 MG: 15 TABLET, FILM COATED ORAL at 20:05

## 2020-03-06 RX ADMIN — SODIUM CHLORIDE, PRESERVATIVE FREE 10 ML: 5 INJECTION INTRAVENOUS at 20:06

## 2020-03-06 RX ADMIN — DEXAMETHASONE SODIUM PHOSPHATE 4 MG: 4 INJECTION, SOLUTION INTRAMUSCULAR; INTRAVENOUS at 15:18

## 2020-03-06 RX ADMIN — LEVETIRACETAM 750 MG: 500 TABLET, FILM COATED ORAL at 20:11

## 2020-03-06 RX ADMIN — DEXAMETHASONE SODIUM PHOSPHATE 4 MG: 4 INJECTION, SOLUTION INTRAMUSCULAR; INTRAVENOUS at 06:12

## 2020-03-06 RX ADMIN — SODIUM CHLORIDE, PRESERVATIVE FREE 10 ML: 5 INJECTION INTRAVENOUS at 10:32

## 2020-03-06 NOTE — PROGRESS NOTES
Physical Therapy Treatment Note  Name: Grzegorz Sherman MRN: [id number] :   1950   Date:  3/6/2020   Admission Date: 3/2/2020 Room:  [address] [place], [place] [address]   Restrictions/Precautions:  Restrictions/Precautions  Restrictions/Precautions: Contact Precautions  Required Braces or Orthoses?: No       Communication with other providers:    Subjective:  Patient states:  Agreeable to PT tx. Feels okay. Pain:   Location, Type, Intensity (0/10 to 10/10):  denies  Objective:    Observation:  Some difficulty with word finding but is conversant, oriented, cooperative  Treatment, including education/measures:  Therapeutic Activity Training:   Therapeutic activity training was instructed today. Cues were given for safety, sequence, UE/LE placement, awareness, and balance. Activities performed today included bed mobility training, sup-sit, sit-stand, SPT. Mod A bed mobility-- difficulty with R LE control. Needs assist to clear EOB and for trunk boost, seated scooting. Mod A transfers--inconsistent hand placement, cues to correct with some carryover. Needs feedback for R LE positioning    Gait Training:  Cues were given for safety, sequence, device management, balance, posture, awareness, path. 3x 10 ft with RW min/mod A-- R LE ataxia, recurvatum through stance. Has difficulty initiating swing and needs intermittent physical assist to advance R LE. Weight shifting compensations to clear R LE perturbs balance, min/mod A for steadying  Assessment / Impression:       Pt ambulating short distances and transferring at mod A. R LE gross motor control is significantly impaired.  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

## 2020-03-06 NOTE — PROGRESS NOTES
Hospitalist Progress Note      Name:  Pritesh Maher /Age/Sex: 1950  (68 y.o. male)   MRN & CSN:  [id number] & [id number] Admission Date/Time: 3/2/2020 12:41 PM   Location:  [address] [place] [address]-A PCP: Rafia Sky MD       Pritesh Maher is a 68 y.o.  male  who presents with Fatigue (started Friday )      Assessment and Plan:   Generalized weakness/fatigue - likely 2/2 bladder cancer with mets to brain,  - pt/ot for rehab needs  - MRI brain: increased size of paramedian high left parietal mass, surrounding vasogenic edema noted  - will start IV steroids TID for now d/w oncology, transition to oral on discharge  - oncology following, resume avastin as outpatient, Recommend to follow up with Dr Joyce Ernst as outpatient. - palliative care consulted       Elevated troponin level - denied chest pain, initial trop 0.041 but has been elevated from previous data. 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    Seizures - c/w Keppra, seizure precautions.               Diet DIET GENERAL;   Code Status DNR-CCA     Medications:   Medications:    amLODIPine  5 mg Oral Daily    warfarin (COUMADIN) daily dosing (placeholder)   Other RX Placeholder    hydroCHLOROthiazide  25 mg Oral Daily    dexamethasone  4 mg Intravenous Q8H    b complex vitamins  1 capsule Oral Daily    doxycycline hyclate  100 mg Oral BID    dronabinol  5 mg Oral BID AC    gabapentin  300 mg Oral TID    lactobacillus  1 capsule Oral QAM    levETIRAcetam  750 mg Oral BID    vitamin A & D   Topical BID    mirtazapine  45 mg Oral Nightly    sodium chloride flush  10 mL Intravenous 2 times per day    zinc oxide   Topical Daily      Infusions:   PRN Meds: acetaminophen, 500 mg, Q4H

## 2020-03-06 NOTE — PROGRESS NOTES
Henreitta Fleischer is a 68 y.o. male on warfarin therapy for anticoagualtion/metastases . Pharmacy consulted by DAVID Cantrell-CNP for monitoring and adjustment of treatment. Indication for anticoagulation: Anticoagulation due to cancer  INR goal: 2-3  Warfarin dose prior to admission: 5mg qd    INR MONITORING  Recent Labs     03/04/20  0551 03/05/20  0825 03/06/20  0719   INR 3.01 3.40 3.55     HEMATOLOGY  No results for input(s): SEGSABS, WBC, PLT, HGB, HCT in the last 72 hours. Assessment/Plan:   Drug Interactions: doxyxycline   Goal INR:  2-3   INR remains supra-therapeutic.  Will hold Warfarin for now. Restart when INR <3.   [address] [place] [address] will continue to monitor and adjust warfarin therapy as indicated    Thank you for the consult. Aleksnadra Parham  3/6/2020 @ 3:06 PM

## 2020-03-06 NOTE — PROGRESS NOTES
continue steroids. Recommend to follow up with Dr Kamille Urias at [facility] as outpatient. 2. To continue with PT.    3. Pharmacist has been monitoring INR. We will continue to follow the patient. Thank you.

## 2020-03-07 LAB
INR BLD: 2.9 INDEX
PROTHROMBIN TIME: 35.5 SECONDS (ref 11.7–14.5)

## 2020-03-07 PROCEDURE — 85610 PROTHROMBIN TIME: CPT

## 2020-03-07 PROCEDURE — 2580000003 HC RX 258: Performed by: NURSE PRACTITIONER

## 2020-03-07 PROCEDURE — 6360000002 HC RX W HCPCS: Performed by: FAMILY MEDICINE

## 2020-03-07 PROCEDURE — 6370000000 HC RX 637 (ALT 250 FOR IP): Performed by: NURSE PRACTITIONER

## 2020-03-07 PROCEDURE — 94761 N-INVAS EAR/PLS OXIMETRY MLT: CPT

## 2020-03-07 PROCEDURE — 6370000000 HC RX 637 (ALT 250 FOR IP): Performed by: HOSPITALIST

## 2020-03-07 PROCEDURE — 1200000000 HC SEMI PRIVATE

## 2020-03-07 PROCEDURE — 6370000000 HC RX 637 (ALT 250 FOR IP): Performed by: FAMILY MEDICINE

## 2020-03-07 RX ORDER — AMLODIPINE BESYLATE 10 MG/1
10 TABLET ORAL DAILY
Status: DISCONTINUED | OUTPATIENT
Start: 2020-03-08 | End: 2020-03-09 | Stop reason: HOSPADM

## 2020-03-07 RX ORDER — DEXAMETHASONE SODIUM PHOSPHATE 4 MG/ML
4 INJECTION, SOLUTION INTRA-ARTICULAR; INTRALESIONAL; INTRAMUSCULAR; INTRAVENOUS; SOFT TISSUE EVERY 12 HOURS
Status: DISCONTINUED | OUTPATIENT
Start: 2020-03-07 | End: 2020-03-09

## 2020-03-07 RX ORDER — AMLODIPINE BESYLATE 5 MG/1
5 TABLET ORAL ONCE
Status: COMPLETED | OUTPATIENT
Start: 2020-03-07 | End: 2020-03-07

## 2020-03-07 RX ADMIN — DOXYCYCLINE HYCLATE 100 MG: 100 TABLET, COATED ORAL at 20:07

## 2020-03-07 RX ADMIN — MIRTAZAPINE 45 MG: 15 TABLET, FILM COATED ORAL at 20:08

## 2020-03-07 RX ADMIN — WARFARIN SODIUM 3 MG: 2 TABLET ORAL at 18:05

## 2020-03-07 RX ADMIN — DRONABINOL 5 MG: 2.5 CAPSULE ORAL at 06:17

## 2020-03-07 RX ADMIN — SODIUM CHLORIDE, PRESERVATIVE FREE 10 ML: 5 INJECTION INTRAVENOUS at 10:24

## 2020-03-07 RX ADMIN — GABAPENTIN 300 MG: 300 CAPSULE ORAL at 09:08

## 2020-03-07 RX ADMIN — DEXAMETHASONE SODIUM PHOSPHATE 4 MG: 4 INJECTION, SOLUTION INTRAMUSCULAR; INTRAVENOUS at 06:17

## 2020-03-07 RX ADMIN — LEVETIRACETAM 750 MG: 500 TABLET, FILM COATED ORAL at 09:06

## 2020-03-07 RX ADMIN — AMLODIPINE BESYLATE 5 MG: 5 TABLET ORAL at 09:06

## 2020-03-07 RX ADMIN — AMLODIPINE BESYLATE 5 MG: 5 TABLET ORAL at 10:23

## 2020-03-07 RX ADMIN — GABAPENTIN 300 MG: 300 CAPSULE ORAL at 20:08

## 2020-03-07 RX ADMIN — Medication 1 CAPSULE: at 09:08

## 2020-03-07 RX ADMIN — DOXYCYCLINE HYCLATE 100 MG: 100 TABLET, COATED ORAL at 09:08

## 2020-03-07 RX ADMIN — Medication 1 CAPSULE: at 09:07

## 2020-03-07 RX ADMIN — LEVETIRACETAM 750 MG: 500 TABLET, FILM COATED ORAL at 20:07

## 2020-03-07 RX ADMIN — DEXAMETHASONE SODIUM PHOSPHATE 4 MG: 4 INJECTION, SOLUTION INTRAMUSCULAR; INTRAVENOUS at 18:05

## 2020-03-07 RX ADMIN — HYDROCHLOROTHIAZIDE 25 MG: 25 TABLET ORAL at 09:05

## 2020-03-07 RX ADMIN — GABAPENTIN 300 MG: 300 CAPSULE ORAL at 15:01

## 2020-03-07 RX ADMIN — LANOLIN, PETROLATUM: 15.5; 53.4 OINTMENT TOPICAL at 09:09

## 2020-03-07 RX ADMIN — DRONABINOL 5 MG: 2.5 CAPSULE ORAL at 15:07

## 2020-03-07 RX ADMIN — LANOLIN, PETROLATUM: 15.5; 53.4 OINTMENT TOPICAL at 15:02

## 2020-03-07 RX ADMIN — Medication: at 09:09

## 2020-03-07 ASSESSMENT — PAIN SCALES - GENERAL
PAINLEVEL_OUTOF10: 0
PAINLEVEL_OUTOF10: 0

## 2020-03-07 NOTE — PLAN OF CARE
Problem: Pain:  Goal: Pain level will decrease  Description: Pain level will decrease  3/7/2020 1421 by Conchis Galdamez  Outcome: Ongoing  3/7/2020 1420 by Mikhail Bueno RN  Outcome: Ongoing  3/7/2020 1420 by Conchis Galdamez  Outcome: Ongoing     Problem: Pain:  Goal: Control of acute pain  Description: Control of acute pain  3/7/2020 1421 by Conchis Galdamez  Outcome: Ongoing  3/7/2020 1420 by Mikhail Bueno RN  Outcome: Ongoing  3/7/2020 1420 by Conchis Galdamez  Outcome: Ongoing     Problem: Pain:  Goal: Control of chronic pain  Description: Control of chronic pain  3/7/2020 1421 by Conchis Galdamez  Outcome: Ongoing  3/7/2020 1420 by Mikhail Bueno RN  Outcome: Ongoing  3/7/2020 1420 by Conchis Galdamez  Outcome: Ongoing     Problem: Risk for Impaired Skin Integrity  Goal: Tissue integrity - skin and mucous membranes  Description: Structural intactness and normal physiological function of skin and  mucous membranes.   3/7/2020 1421 by Conchis Galdamez  Outcome: Ongoing  3/7/2020 1420 by Mikhail Bueno RN  Outcome: Ongoing  3/7/2020 1420 by Conchis Galdamez  Outcome: Ongoing

## 2020-03-08 LAB
INR BLD: 1.9 INDEX
PROTHROMBIN TIME: 23.1 SECONDS (ref 11.7–14.5)

## 2020-03-08 PROCEDURE — 85610 PROTHROMBIN TIME: CPT

## 2020-03-08 PROCEDURE — 2580000003 HC RX 258: Performed by: NURSE PRACTITIONER

## 2020-03-08 PROCEDURE — 6370000000 HC RX 637 (ALT 250 FOR IP): Performed by: HOSPITALIST

## 2020-03-08 PROCEDURE — 6360000002 HC RX W HCPCS: Performed by: FAMILY MEDICINE

## 2020-03-08 PROCEDURE — 6370000000 HC RX 637 (ALT 250 FOR IP): Performed by: FAMILY MEDICINE

## 2020-03-08 PROCEDURE — 6370000000 HC RX 637 (ALT 250 FOR IP): Performed by: NURSE PRACTITIONER

## 2020-03-08 PROCEDURE — 1200000000 HC SEMI PRIVATE

## 2020-03-08 RX ORDER — HYDROCHLOROTHIAZIDE 25 MG/1
50 TABLET ORAL DAILY
Status: DISCONTINUED | OUTPATIENT
Start: 2020-03-09 | End: 2020-03-09 | Stop reason: HOSPADM

## 2020-03-08 RX ORDER — HYDROCHLOROTHIAZIDE 25 MG/1
25 TABLET ORAL ONCE
Status: DISCONTINUED | OUTPATIENT
Start: 2020-03-08 | End: 2020-03-09 | Stop reason: HOSPADM

## 2020-03-08 RX ADMIN — GABAPENTIN 300 MG: 300 CAPSULE ORAL at 15:18

## 2020-03-08 RX ADMIN — MIRTAZAPINE 45 MG: 15 TABLET, FILM COATED ORAL at 20:40

## 2020-03-08 RX ADMIN — LEVETIRACETAM 750 MG: 500 TABLET, FILM COATED ORAL at 20:40

## 2020-03-08 RX ADMIN — WARFARIN SODIUM 3 MG: 2 TABLET ORAL at 18:16

## 2020-03-08 RX ADMIN — Medication 1 CAPSULE: at 09:59

## 2020-03-08 RX ADMIN — HYDROCHLOROTHIAZIDE 25 MG: 25 TABLET ORAL at 09:59

## 2020-03-08 RX ADMIN — GABAPENTIN 300 MG: 300 CAPSULE ORAL at 09:59

## 2020-03-08 RX ADMIN — DEXAMETHASONE SODIUM PHOSPHATE 4 MG: 4 INJECTION, SOLUTION INTRAMUSCULAR; INTRAVENOUS at 05:35

## 2020-03-08 RX ADMIN — DRONABINOL 5 MG: 2.5 CAPSULE ORAL at 15:18

## 2020-03-08 RX ADMIN — Medication: at 10:05

## 2020-03-08 RX ADMIN — DOXYCYCLINE HYCLATE 100 MG: 100 TABLET, COATED ORAL at 20:40

## 2020-03-08 RX ADMIN — AMLODIPINE BESYLATE 10 MG: 10 TABLET ORAL at 09:58

## 2020-03-08 RX ADMIN — LANOLIN, PETROLATUM: 15.5; 53.4 OINTMENT TOPICAL at 10:05

## 2020-03-08 RX ADMIN — GABAPENTIN 300 MG: 300 CAPSULE ORAL at 20:40

## 2020-03-08 RX ADMIN — LEVETIRACETAM 750 MG: 500 TABLET, FILM COATED ORAL at 09:59

## 2020-03-08 RX ADMIN — DRONABINOL 5 MG: 2.5 CAPSULE ORAL at 05:34

## 2020-03-08 RX ADMIN — SODIUM CHLORIDE, PRESERVATIVE FREE 10 ML: 5 INJECTION INTRAVENOUS at 09:59

## 2020-03-08 RX ADMIN — DEXAMETHASONE SODIUM PHOSPHATE 4 MG: 4 INJECTION, SOLUTION INTRAMUSCULAR; INTRAVENOUS at 18:16

## 2020-03-08 RX ADMIN — DOXYCYCLINE HYCLATE 100 MG: 100 TABLET, COATED ORAL at 09:59

## 2020-03-08 RX ADMIN — LANOLIN, PETROLATUM: 15.5; 53.4 OINTMENT TOPICAL at 15:19

## 2020-03-08 ASSESSMENT — PAIN SCALES - GENERAL
PAINLEVEL_OUTOF10: 0

## 2020-03-08 NOTE — PROGRESS NOTES
Hospitalist Progress Note      Name:  Ramírez Banks /Age/Sex: 1950  (68 y.o. male)   MRN & CSN:  [id number] & [id number] Admission Date/Time: 3/2/2020 12:41 PM   Location:  [address] [place] [address]-A PCP: Otf Rosenberg MD       Ramírez Banks is a 68 y.o.  male  who presents with Fatigue (started Friday )      Assessment and Plan:   Generalized weakness/fatigue - likely 2/2 bladder cancer with mets to brain,  - pt/ot for rehab needs  - MRI brain: increased size of paramedian high left parietal mass, surrounding vasogenic edema noted  - will start IV steroids TID taper to BID , transition to oral on discharge  - oncology following, resume avastin as outpatient, Recommend to follow up with Dr Sanchez Garcia as outpatient. - palliative care consulted       Elevated troponin level - denied chest pain, initial trop 0.041 but has been elevated from previous data. - trend trop  - c/w tele monitoring      HTN, uncontrolled  - incrase HCTZ to 50  - increase norvasc to 10  - monitor and titrate prn     SNF pending         Right LE DVT with 2019 s/p IVC filter - on Warfarin, daily PT/INR, pharmacy dosing   H/o MRSA, UTI - c/w oral Vibramycin, contact isolation.    Seizures - c/w Keppra, seizure precautions.               Diet DIET GENERAL;   Code Status DNR-CCA     Medications:   Medications:    amLODIPine  10 mg Oral Daily    dexamethasone  4 mg Intravenous Q12H    warfarin  3 mg Oral Daily    hydroCHLOROthiazide  25 mg Oral Daily    b complex vitamins  1 capsule Oral Daily    doxycycline hyclate  100 mg Oral BID    dronabinol  5 mg Oral BID AC    gabapentin  300 mg Oral TID    lactobacillus  1 capsule Oral QAM    levETIRAcetam  750 mg Oral BID    vitamin A & D   Topical BID    mirtazapine  45 mg Oral Nightly    sodium chloride flush  10 mL Intravenous 2 times per day    zinc oxide   Topical Daily      Infusions:   PRN Meds: acetaminophen, 500 mg, Q4H PRN  sennosides-docusate sodium, 1 tablet, PRN  sodium chloride flush, 10 mL, PRN  acetaminophen, 650 mg, Q6H PRN  promethazine, 12.5 mg, Q6H PRN    Or  ondansetron, 4 mg, Q6H PRN  polyethylene glycol, 17 g, Daily PRN      Subjective:   Doing well    Objective: Intake/Output Summary (Last 24 hours) at 3/8/2020 1059  Last data filed at 3/8/2020 1006  Gross per 24 hour   Intake 300 ml   Output 1900 ml   Net -1600 ml      Vitals:   Vitals:    03/08/20 0945   BP: (!) 181/85   Pulse: 54   Resp:    Temp:    SpO2:      Physical Exam:   Gen:  awake, alert, cooperative, no apparent distress  Head/Eyes:  Normocephalic atraumatic, EOMI   NECK:   symmetrical, trachea midline  LUNGS: Normal Effort   CARDIOVASCULAR:  Normal rate  ABDOMEN: Non tender, non distended, no HSM noted. MUSCULOSKELETAL:  ROM WNL  NEUROLOGIC: Alert and Oriented,  Cranial nerves II-XII are grossly intact. SKIN:  no bruising or bleeding, normal skin color,  no redness      Data:       CBC No results for input(s): WBC, HGB, HCT, PLT in the last 72 hours. BMP No results for input(s): NA, K, CL, CO2, PHOS, BUN, CREATININE in the last 72 hours.     Invalid input(s): CA      Electronically signed by Kiara Fung MD on 3/8/2020 at 10:59 AM

## 2020-03-08 NOTE — PROGRESS NOTES
Jenna Bolton is a 68 y.o. male on warfarin therapy for anticoagualtion/metastases . Pharmacy consulted by Dr. Stanford Villela, DAVID-CNP for monitoring and adjustment of treatment. Indication for anticoagulation: Anticoagulation due to cancer, Hx of DVT  INR goal: 2-3  Warfarin dose prior to admission: 5mg qd    INR MONITORING  Recent Labs     03/06/20  0719 03/07/20  0612 03/08/20  1515   INR 3.55 2.90 1.90     HEMATOLOGY  No results for input(s): SEGSABS, WBC, PLT, HGB, HCT in the last 72 hours. Assessment/Plan:   Drug Interactions: doxycycline   Goal INR:  2-3   INR was supra-therapeutic @3.55 on 3/6, now subtherapeutic today after dose being held for 2 days. Warfarin restarted last night @3mg daily.  Will continue warfarin 3mg daily tonight and see where INR trends. [address] [place] [address] will continue to monitor and adjust warfarin therapy as indicated    Thank you for the consult. Jamar Agrawal  3/8/2020 @ 4:07 PM

## 2020-03-09 VITALS
RESPIRATION RATE: 16 BRPM | SYSTOLIC BLOOD PRESSURE: 145 MMHG | TEMPERATURE: 97.3 F | BODY MASS INDEX: 25.76 KG/M2 | HEIGHT: 71 IN | WEIGHT: 184 LBS | OXYGEN SATURATION: 95 % | HEART RATE: 69 BPM | DIASTOLIC BLOOD PRESSURE: 77 MMHG

## 2020-03-09 LAB
INR BLD: 1.83 INDEX
PROTHROMBIN TIME: 22.3 SECONDS (ref 11.7–14.5)

## 2020-03-09 PROCEDURE — 97530 THERAPEUTIC ACTIVITIES: CPT

## 2020-03-09 PROCEDURE — 97110 THERAPEUTIC EXERCISES: CPT

## 2020-03-09 PROCEDURE — 2580000003 HC RX 258: Performed by: NURSE PRACTITIONER

## 2020-03-09 PROCEDURE — 6370000000 HC RX 637 (ALT 250 FOR IP): Performed by: NURSE PRACTITIONER

## 2020-03-09 PROCEDURE — 6370000000 HC RX 637 (ALT 250 FOR IP): Performed by: FAMILY MEDICINE

## 2020-03-09 PROCEDURE — 85610 PROTHROMBIN TIME: CPT

## 2020-03-09 PROCEDURE — 99231 SBSQ HOSP IP/OBS SF/LOW 25: CPT | Performed by: NURSE PRACTITIONER

## 2020-03-09 PROCEDURE — 97535 SELF CARE MNGMENT TRAINING: CPT

## 2020-03-09 PROCEDURE — 94761 N-INVAS EAR/PLS OXIMETRY MLT: CPT

## 2020-03-09 PROCEDURE — 6360000002 HC RX W HCPCS: Performed by: FAMILY MEDICINE

## 2020-03-09 RX ORDER — HYDROCHLOROTHIAZIDE 50 MG/1
50 TABLET ORAL DAILY
Qty: 30 TABLET | Refills: 3 | Status: ON HOLD | DISCHARGE
Start: 2020-03-10 | End: 2020-03-21 | Stop reason: HOSPADM

## 2020-03-09 RX ORDER — HEPARIN SODIUM (PORCINE) LOCK FLUSH IV SOLN 100 UNIT/ML 100 UNIT/ML
500 SOLUTION INTRAVENOUS PRN
Status: DISCONTINUED | OUTPATIENT
Start: 2020-03-09 | End: 2020-03-09 | Stop reason: HOSPADM

## 2020-03-09 RX ORDER — AMLODIPINE BESYLATE 10 MG/1
10 TABLET ORAL DAILY
Qty: 30 TABLET | Refills: 3 | Status: ON HOLD | DISCHARGE
Start: 2020-03-10 | End: 2020-03-21 | Stop reason: HOSPADM

## 2020-03-09 RX ORDER — DEXAMETHASONE 2 MG/1
2 TABLET ORAL
Status: DISCONTINUED | OUTPATIENT
Start: 2020-03-10 | End: 2020-03-09 | Stop reason: HOSPADM

## 2020-03-09 RX ORDER — LISINOPRIL 5 MG/1
5 TABLET ORAL DAILY
Status: DISCONTINUED | OUTPATIENT
Start: 2020-03-09 | End: 2020-03-09

## 2020-03-09 RX ADMIN — GABAPENTIN 300 MG: 300 CAPSULE ORAL at 10:26

## 2020-03-09 RX ADMIN — GABAPENTIN 300 MG: 300 CAPSULE ORAL at 15:46

## 2020-03-09 RX ADMIN — DEXAMETHASONE SODIUM PHOSPHATE 4 MG: 4 INJECTION, SOLUTION INTRAMUSCULAR; INTRAVENOUS at 05:41

## 2020-03-09 RX ADMIN — LEVETIRACETAM 750 MG: 500 TABLET, FILM COATED ORAL at 10:26

## 2020-03-09 RX ADMIN — DOXYCYCLINE HYCLATE 100 MG: 100 TABLET, COATED ORAL at 10:26

## 2020-03-09 RX ADMIN — DRONABINOL 5 MG: 2.5 CAPSULE ORAL at 05:42

## 2020-03-09 RX ADMIN — SODIUM CHLORIDE, PRESERVATIVE FREE 10 ML: 5 INJECTION INTRAVENOUS at 10:27

## 2020-03-09 RX ADMIN — DRONABINOL 5 MG: 2.5 CAPSULE ORAL at 15:46

## 2020-03-09 RX ADMIN — Medication 1 CAPSULE: at 10:26

## 2020-03-09 RX ADMIN — Medication: at 10:27

## 2020-03-09 RX ADMIN — HYDROCHLOROTHIAZIDE 50 MG: 25 TABLET ORAL at 10:26

## 2020-03-09 RX ADMIN — AMLODIPINE BESYLATE 10 MG: 10 TABLET ORAL at 10:26

## 2020-03-09 RX ADMIN — LANOLIN, PETROLATUM: 15.5; 53.4 OINTMENT TOPICAL at 10:27

## 2020-03-09 RX ADMIN — Medication 500 UNITS: at 15:59

## 2020-03-09 ASSESSMENT — PAIN SCALES - GENERAL
PAINLEVEL_OUTOF10: 0

## 2020-03-09 NOTE — PROGRESS NOTES
Grzegorz Sherman is a 68 y.o. male patient. Allergies   Allergen Reactions    Niacin And Related Hives    Furosemide      Principal Problem:    Generalized weakness  Active Problems:    Elevated troponin level  Resolved Problems:    * No resolved hospital problems. *    Blood pressure (!) 171/73, pulse 54, temperature 96.4 °F (35.8 °C), temperature source Axillary, resp. rate 16, height 5' 11\" (1.803 m), weight 185 lb 11.2 oz (84.2 kg), SpO2 96 %. Subjective:  Symptoms:  Stable. He reports weakness. (Patient has slept through the night with no issues to note. Patient remains a times two assist to pivot to chair. Patient discharging to [facility] for further rehab and strength training). Diet:  Adequate intake. Activity level: Impaired due to weakness. Pain:  He reports pain is improving. Pain is well controlled.       Objective  Assessment & Plan    Lenora Murdock LPN  5/0/8859

## 2020-03-09 NOTE — PROGRESS NOTES
ONCOLOGY HEMATOLOGY CARE ([facility])  PROGRESS NOTE      Patient was seen and examined today. Not in any acute distress and no overnight events. No new complaints. PHYSICAL EXAM    Vitals: BP (!) 155/77   Pulse 50   Temp 98 °F (36.7 °C) (Oral)   Resp 16   Ht 5' 11\" (1.803 m)   Wt 184 lb (83.5 kg)   SpO2 97%   BMI 25.66 kg/m²   CONSTITUTIONAL: awake, alert, cooperative, no apparent distress   EYES: sclera clear and conjunctiva normal  ENT: Normocephalic, without obvious abnormality, atraumatic  NECK: supple, symmetrical  HEMATOLOGIC/LYMPHATIC: no cervical, supraclavicular or axillary lymphadenopathy   LUNGS: no increased work of breathing and clear to auscultation   CARDIOVASCULAR: regular rate and rhythm, normal S1 and S2, no murmur noted  ABDOMEN: normal bowel sounds x 4, soft, non-distended, non-tender, no masses palpated, no hepatosplenomgaly   MUSCULOSKELETAL: upper extremities equal but diminished, lower extremities strength L>R  NEUROLOGIC: awake, alert, oriented to name, place and time. SKIN: No jaundice. Dry skin. Abrasion noted. EXTREMITIES: no LE edema     LABORATORY RESULTS  CBC:   No results for input(s): WBC, HGB, PLT in the last 72 hours. BMP:    No results for input(s): NA, K, CL, CO2, BUN, CREATININE, GLUCOSE in the last 72 hours. Hepatic:   No results for input(s): AST, ALT, ALB, BILITOT, ALKPHOS in the last 72 hours. INR:   Recent Labs     03/07/20  0612 03/08/20  1515 03/09/20  0417   INR 2.90 1.90 1.83     ASSESSMENT/RECOMMENDATION    1. 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 S/p WBRT and SBRT to brain. MRI reviewed. Has radiation necrosis. Consider resuming Avastin once strength has improved. To continue steroids. Recommend to follow up with Dr Maco Young at [facility] as outpatient. 2. To continue with PT. - SNF placement pending. 3. Pharmacist has been monitoring INR. He will follow up with our office as outpatient.     We will continue to follow the patient. Thank you.

## 2020-03-09 NOTE — PROGRESS NOTES
Physical Therapy  . Physical Therapy Treatment Note  Name: Gabino Leavitt MRN: [id number] :   1950   Date:  3/9/2020   Admission Date: 3/2/2020 Room:  [address] [place], [place] [address]     Restrictions/Precautions:  Restrictions/Precautions  Restrictions/Precautions: Contact Precautions  Required Braces or Orthoses?: No         Communication with other providers:  SEAN Cho cleared patient for physical therapy. CoTx with Sravani Phipps. Subjective:  Patient states:  \"I don't want to sit up in the chair. \"   Pain:   Location, Type, Intensity (0/10 to 10/10): Denies pain. Objective:    Observation:  Patient seated on BSC upon therapist arrival.   A&O x3 (date)      Treatment, including education/measures:  Transfers  Supine to sit :NT  Sit to supine :maximal assistance x2  Scooting : SBA with verbal instructions to scoot in bed with bed features  Rolling :moderate - maximal assistance x1 with bed features  Sit to stand :maximal assistance x2 from [facility] to RW with forward flexed posture. Patient unable to correct posture with verbal instructions and maximal assistance x2. Patient completed x3 STS transfer from [facility] for hygiene and dressing with poor standing tolerance. Stand to sit :maximal assistance x2  SPT:maximal assistance x2 for SPT from [facility] to bed transferring to the right side (patient's weak side). Recommending placing BSC at foot of bed to initiate transfer to right side initially as patient fatigues after toileting (setting patient up for transfer to strong side / left side when returning to bed). Therapeutic Exercise:  Therapeutic exercises were instructed today. Instructions were given for technique, safety, recruitment, and rationale. Instructions were verbal and/or tactile. Supine:   Ankle pumps 1 sets of 10  Heel slides 1 sets of 10  Hip abduction / adduction 1 sets of 10 (AAROM R LE)  Short arc quads 1 sets of 10  Straight leg raises 1 sets of 10 (AAROM R LE)  Adduction sets 1 set of 10 reps with 5 second hold      Standing balance:  Patient tolerated less than 1 minute of standing. Patient required maximal assist x1-2 with no LOB but presents with significantly forward flexed posture. Patient was limited by fatigue and generalized weakness. Safety  Patient left safely in the bed, with call light/phone in reach with alarm applied. Gait belt was used for transfers and gait. Assessment / Impression:    Patient's tolerance of treatment:  well   Adverse Reaction: none  Significant change in status and impact:  none  Barriers to improvement: decreased endurance, generalized weakness, medical status  Discharge plan: pending     Plan for Next Session:    Per POC    Time in:  0925  Time out:  1005  Timed treatment minutes:  40  Total treatment time:  36    Previously filed items:  Social/Functional History  Lives With: Significant other  Type of Home: House  Home Layout: One level, Laundry in basement  Home Access: Stairs to enter with rails, Ramped entrance  Entrance Stairs - Number of Steps: 5  Entrance Stairs - Rails: Both  Bathroom Shower/Tub: Tub/Shower unit, Walk-in shower(may have shower chair)  Bathroom Toilet: Handicap height  Bathroom Equipment: Grab bars in [address]: Rolling walker, [address] bed(STEDY)  [address] Help From: Friend(s), Family  ADL Assistance: Needs assistance  Homemaking Assistance: Needs assistance  Homemaking Responsibilities: No(attempted to set up from wc level)  Ambulation Assistance: Needs assistance  Transfer Assistance: Needs assistance  Active : No  Occupation: Retired  Type of occupation:   Leisure & Hobbies: reading, watching sports  Additional Comments: P was recently a patient in [place] for rehab  Short term goals  Time Frame for Short term goals: 1 week  Short term goal 1: P will perform bed mobility with min A. Short term goal 2: P will perform sit <> stand to RW with CGA.   Short term goal 3: P will

## 2020-03-09 NOTE — CARE COORDINATION
CM placed a message to [facility] TAMI regarding precert. White board for PT/OT updated notes.  [address]

## 2020-03-09 NOTE — DISCHARGE SUMMARY
Wallace San 1950 [id number]  PCP:  Ron Martinez MD    Admit date: 3/2/2020  Admitting Physician: Kevyn Hicks MD    Discharge date: 3/9/2020 Discharge Physician: Ely Carrion MD         Hospital Course and Discharge Diagnoses Include:    Generalized weakness/fatigue - likely 2/2 bladder cancer with mets to brain,  - pt/ot for rehab needs  - MRI brain: increased size of paramedian high left parietal mass, surrounding vasogenic edema noted  - will start IV steroids TID tapered to oral home dose now  - oncology following, resume avastin as outpatient, Recommend to follow up with Dr Karen Roper as outpatient. - palliative care consulted       Elevated troponin level - denied chest pain, initial trop 0.041 but has been elevated from previous data. - trend trop  - c/w tele monitoring      HTN, uncontrolled  - increased HCTZ to 50  - increased norvasc to 10  - monitor and titrate prn              Right LE DVT with August 2019 s/p IVC filter - on Warfarin, daily PT/INR, pharmacy dosing   H/o MRSA, UTI - c/w oral Vibramycin, contact isolation.  Seizures - c/w Keppra, seizure precautions. Physical Exam on Discharge date: 03/09/20  Gen:  awake, alert, cooperative, no apparent distress  Head/Eyes:  Normocephalic atraumatic, EOMI   NECK:   symmetrical, trachea midline  LUNGS: Normal Effort   CARDIOVASCULAR:  Normal rate  ABDOMEN: Non tender, non distended, no HSM noted. MUSCULOSKELETAL:  ROM WNL  NEUROLOGIC: Alert and Oriented,  Cranial nerves II-XII are grossly intact. SKIN:  no bruising or bleeding, normal skin color,  no redness    Procedures:  See above  Ct Abdomen Pelvis Wo Contrast Additional Contrast? None    Result Date: 3/3/2020  EXAMINATION: CT OF THE ABDOMEN AND PELVIS WITHOUT CONTRAST 3/3/2020 8:04 am TECHNIQUE: CT of the abdomen and pelvis was performed without the administration of intravenous contrast. Multiplanar reformatted images are provided for review.  Dose modulation, iterative reconstruction, and/or weight based adjustment of the mA/kV was utilized to reduce the radiation dose to as low as reasonably achievable. COMPARISON: Abdomen and pelvis CT 01/27/2020 HISTORY: ORDERING SYSTEM PROVIDED HISTORY: ELEVATED lft, constipation, weakness, hx of bladder cancer . R/o mets TECHNOLOGIST PROVIDED HISTORY: Reason for exam:->ELEVATED lft, constipation, weakness, hx of bladder cancer . R/o mets Additional Contrast?->None Reason for Exam: ELEVATED lft, constipation, weakness, hx of bladder cancer . R/o mets Acuity: Acute Type of Exam: Initial Additional signs and symptoms: NONE Relevant Medical/Surgical History: HX OF DIVERTICULITIS AND BLADDER CANCER FINDINGS: Lack of intravenous contrast administration, partially limiting evaluation of the soft tissues and vasculature. LOWER CHEST:  Unchanged reticulations and patchy hyperdensity in dependent portions of the bilateral lower lobes, suggesting prior aspiration. Mild cardiomegaly. Mild dilation of the pulmonary trunk but not out of proportion with the ascending thoracic aorta, and no evident dilation of intrapulmonary pulmonary branches out of proportion with accompanying bronchi. Mitral and aortic valve annular calcifications. No pleural nor pericardial effusions. ORGANS:  Suggestion of subtle layering hyperdensity in the gallbladder lumen potentially due to noncalcified gallstones and/or biliary sludge. No findings of acute cholecystitis. No intrahepatic nor extrahepatic biliary dilation. Moderate pancreatic atrophy. Mild splenic atrophy. Changes of bilateral ureteral diversion via a right upper quadrant ileal conduit. No hydroureteronephrosis. Normal appearance of the liver and adrenal glands. GI/BOWEL:  Changes of partial small bowel resection with primary anastomosis. Otherwise normal course and caliber of the stomach, remnant small bowel, colon, and rectum without obstruction. Normal appearance of the appendix.  Severe colonic diverticulosis. Acute diverticulitis involving a diverticulum in the medial portion of the mid descending colon. PELVIS:  Surgically absent urinary bladder and prostate. No abnormal soft tissue in the operative bed. PERITONEUM/RETROPERITONEUM:  Mild systemic atherosclerotic calcifications. Unchanged infrarenal inferior vena cava filter in expected position. No abdominal nor pelvic lymphadenopathy. No free intraperitoneal fluid nor gas. SOFT TISSUES/BONES:  Laxity of the anterolateral abdominal wall musculature. Right upper quadrant ostomy with no significant parastomal herniation. No inguinal lymphadenopathy. Diffuse osseous demineralization. No acute fractures nor suspicious osseous lesions. 1. Severe colonic diverticulosis with uncomplicated acute diverticulitis involving the mid descending colon. 2. Cystoprostatectomy with no findings of disease recurrence in the operative bed. No definite findings of metastatic disease in the abdomen pelvis with evaluation partially limited by lack of intravenous contrast administration. Ct Head Wo Contrast    Result Date: 2/21/2020  EXAMINATION: CT OF THE HEAD WITHOUT CONTRAST  2/21/2020 11:32 am TECHNIQUE: CT of the head was performed without the administration of intravenous contrast. Dose modulation, iterative reconstruction, and/or weight based adjustment of the mA/kV was utilized to reduce the radiation dose to as low as reasonably achievable. COMPARISON: Multiple brain MRIs with the most recent 01/27/2020 and the most remote 09/04/2019. Head CT 08/20/2019.  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 The cardiomediastinal silhouette is stable. No overt pulmonary edema. No acute osseous abnormality. Subtle bibasilar opacities likely atelectasis. Mri Brain W Wo Contrast    Result Date: 3/3/2020  EXAMINATION: MRI OF THE BRAIN WITHOUT AND WITH CONTRAST  3/3/2020 5:00 pm TECHNIQUE: Multiplanar multisequence MRI of the head/brain was performed without and with the administration of intravenous contrast. COMPARISON: MRI brain without and with contrast January 27, 2020. HISTORY: ORDERING SYSTEM PROVIDED HISTORY: weakness. hx of brain mets. r/o worsening mets TECHNOLOGIST PROVIDED HISTORY: Reason for exam:->weakness. hx of brain mets. r/o worsening mets Reason for Exam: weakness. hx of brain mets. r/o worsening mets Acuity: Acute Type of Exam: Initial Relevant Medical/Surgical History: 18 ML Prohance; GFR >60 FINDINGS: INTRACRANIAL STRUCTURES/VENTRICLES:  There is no acute infarct. Paramedian left parietal lobe peripherally enhancing mass lesion is seen which measures 29 mm anteroposterior by 22 mm transverse by 30 mm craniocaudad with surrounding marked vasogenic edema. Mass lesion demonstrates peripheral diffusion restriction abnormality. Severe increased T2/FLAIR signal is seen within the cerebral white matter diffusely. .  The ventricles and sulci are normal in size and configuration. The sellar/suprasellar regions appear unremarkable. The normal signal voids within the major intracranial vessels appear maintained. ORBITS: The visualized portion of the orbits demonstrate no acute abnormality. SINUSES: The visualized paranasal sinuses and mastoid air cells are well aerated. BONES/SOFT TISSUES: The bone marrow signal intensity appears normal. The soft tissues demonstrate no acute abnormality.      1. Increased size of paramedian high left parietal peripherally enhancing mass lesion, with dimensions as discussed above, with surrounding vasogenic edema concerning for metastatic focus. 2. Severe cerebral white matter chronic microvascular ischemic disease.        Significant Diagnostic Studies at discharge:   CBC:   Lab Results   Component Value Date    WBC 9.4 03/02/2020    RBC 4.26 03/02/2020    HGB 13.5 03/02/2020    HCT 41.9 03/02/2020    MCV 98.4 03/02/2020    MCH 31.7 03/02/2020    MCHC 32.2 03/02/2020    RDW 14.3 03/02/2020     03/02/2020    MPV 9.3 03/02/2020       Patient Instructions:     Medication List      START taking these medications    amLODIPine 10 MG tablet  Commonly known as:  NORVASC  Take 1 tablet by mouth daily  Start taking on:  March 10, 2020     hydroCHLOROthiazide 50 MG tablet  Commonly known as:  HYDRODIURIL  Take 1 tablet by mouth daily  Start taking on:  March 10, 2020        CONTINUE taking these medications    acetaminophen 500 MG tablet  Commonly known as:  TYLENOL     b complex vitamins capsule     dexamethasone 1 MG tablet  Commonly known as:  DECADRON     doxycycline hyclate 100 MG capsule  Commonly known as:  VIBRAMYCIN     dronabinol 5 MG capsule  Commonly known as:  MARINOL     gabapentin 300 MG capsule  Commonly known as:  NEURONTIN     levETIRAcetam 750 MG tablet  Commonly known as:  KEPPRA  Take 1 tablet by mouth 2 times daily     menthol-zinc oxide 0.44-20.625 % Oint ointment  Commonly known as:  CALMOSEPTINE     mirtazapine 15 MG tablet  Commonly known as:  REMERON     polyethylene glycol powder  Commonly known as:  GLYCOLAX     PROBIOTIC ACIDOPHILUS PO     senna-docusate 8.6-50 MG per tablet  Commonly known as:  PERICOLACE     UNABLE TO FIND     vitamin A & D ointment     warfarin 5 MG tablet  Commonly known as:  COUMADIN           Where to Get Your Medications      Information about where to get these medications is not yet available    Ask your nurse or doctor about these medications  · amLODIPine 10 MG tablet  · hydroCHLOROthiazide 50 MG tablet            Code Status: DNR-CCA     Consults:   IP CONSULT TO

## 2020-03-10 ENCOUNTER — HOSPITAL ENCOUNTER (OUTPATIENT)
Age: 70
Setting detail: SPECIMEN
Discharge: HOME | End: 2020-03-10

## 2020-03-10 LAB
ALBUMIN SERPL-MCNC: 3.6 GM/DL (ref 3.4–5)
ALP BLD-CCNC: 91 IU/L (ref 40–128)
ALT SERPL-CCNC: 34 U/L (ref 10–40)
ANION GAP SERPL CALCULATED.3IONS-SCNC: 15 MMOL/L (ref 4–16)
AST SERPL-CCNC: 34 IU/L (ref 15–37)
BILIRUB SERPL-MCNC: 0.3 MG/DL (ref 0–1)
BUN BLDV-MCNC: 32 MG/DL (ref 6–23)
CALCIUM SERPL-MCNC: 9 MG/DL (ref 8.3–10.6)
CHLORIDE BLD-SCNC: 97 MMOL/L (ref 99–110)
CO2: 25 MMOL/L (ref 21–32)
CREAT SERPL-MCNC: 0.9 MG/DL (ref 0.9–1.3)
EKG ATRIAL RATE: 76 BPM
EKG DIAGNOSIS: NORMAL
EKG P AXIS: 42 DEGREES
EKG P-R INTERVAL: 144 MS
EKG Q-T INTERVAL: 400 MS
EKG QRS DURATION: 92 MS
EKG QTC CALCULATION (BAZETT): 450 MS
EKG R AXIS: -26 DEGREES
EKG T AXIS: 54 DEGREES
EKG VENTRICULAR RATE: 76 BPM
GFR AFRICAN AMERICAN: >60 ML/MIN/1.73M2
GFR NON-AFRICAN AMERICAN: >60 ML/MIN/1.73M2
GLUCOSE BLD-MCNC: 73 MG/DL (ref 70–99)
INR BLD: 1.95 INDEX
POTASSIUM SERPL-SCNC: 3.7 MMOL/L (ref 3.5–5.1)
PROTHROMBIN TIME: 23.7 SECONDS (ref 11.7–14.5)
SODIUM BLD-SCNC: 137 MMOL/L (ref 135–145)
TOTAL PROTEIN: 5.8 GM/DL (ref 6.4–8.2)

## 2020-03-10 PROCEDURE — 80053 COMPREHEN METABOLIC PANEL: CPT

## 2020-03-10 PROCEDURE — 85610 PROTHROMBIN TIME: CPT

## 2020-03-10 PROCEDURE — 36415 COLL VENOUS BLD VENIPUNCTURE: CPT

## 2020-03-11 ENCOUNTER — HOSPITAL ENCOUNTER (OUTPATIENT)
Age: 70
Setting detail: SPECIMEN
Discharge: HOME | End: 2020-03-11
Payer: MEDICARE

## 2020-03-11 PROCEDURE — 87086 URINE CULTURE/COLONY COUNT: CPT

## 2020-03-11 PROCEDURE — 81001 URINALYSIS AUTO W/SCOPE: CPT

## 2020-03-11 PROCEDURE — 36415 COLL VENOUS BLD VENIPUNCTURE: CPT

## 2020-03-12 LAB
BACTERIA: NEGATIVE /HPF
BILIRUBIN URINE: NEGATIVE MG/DL
BLOOD, URINE: ABNORMAL
CLARITY: ABNORMAL
COLOR: YELLOW
GLUCOSE, URINE: NEGATIVE MG/DL
KETONES, URINE: NEGATIVE MG/DL
LEUKOCYTE ESTERASE, URINE: NEGATIVE
MUCUS: ABNORMAL HPF
NITRITE URINE, QUANTITATIVE: NEGATIVE
PH, URINE: 5 (ref 5–8)
PROTEIN UA: NEGATIVE MG/DL
RBC URINE: 6 /HPF (ref 0–3)
SPECIFIC GRAVITY UA: 1.01 (ref 1–1.03)
TRICHOMONAS: ABNORMAL /HPF
UROBILINOGEN, URINE: NORMAL MG/DL (ref 0.2–1)
WBC UA: 36 /HPF (ref 0–2)

## 2020-03-13 ENCOUNTER — HOSPITAL ENCOUNTER (OUTPATIENT)
Age: 70
Setting detail: SPECIMEN
Discharge: HOME | End: 2020-03-13

## 2020-03-13 LAB
CULTURE: NORMAL
INR BLD: 3.57 INDEX
Lab: NORMAL
PROTHROMBIN TIME: 43.7 SECONDS (ref 11.7–14.5)
SPECIMEN: NORMAL

## 2020-03-13 PROCEDURE — 85610 PROTHROMBIN TIME: CPT

## 2020-03-13 PROCEDURE — 36415 COLL VENOUS BLD VENIPUNCTURE: CPT

## 2020-03-17 ENCOUNTER — HOSPITAL ENCOUNTER (OUTPATIENT)
Age: 70
Setting detail: SPECIMEN
Discharge: HOME | End: 2020-03-17

## 2020-03-17 LAB
ALBUMIN SERPL-MCNC: 3.9 GM/DL (ref 3.4–5)
ALP BLD-CCNC: 78 IU/L (ref 40–128)
ALT SERPL-CCNC: 46 U/L (ref 10–40)
ANION GAP SERPL CALCULATED.3IONS-SCNC: 18 MMOL/L (ref 4–16)
AST SERPL-CCNC: 60 IU/L (ref 15–37)
BILIRUB SERPL-MCNC: 0.4 MG/DL (ref 0–1)
BUN BLDV-MCNC: 47 MG/DL (ref 6–23)
CALCIUM SERPL-MCNC: 9.8 MG/DL (ref 8.3–10.6)
CHLORIDE BLD-SCNC: 95 MMOL/L (ref 99–110)
CO2: 27 MMOL/L (ref 21–32)
CREAT SERPL-MCNC: 0.9 MG/DL (ref 0.9–1.3)
GFR AFRICAN AMERICAN: >60 ML/MIN/1.73M2
GFR NON-AFRICAN AMERICAN: >60 ML/MIN/1.73M2
GLUCOSE BLD-MCNC: 150 MG/DL (ref 70–99)
INR BLD: 7.23 INDEX
POTASSIUM SERPL-SCNC: 3.7 MMOL/L (ref 3.5–5.1)
PROTHROMBIN TIME: 89.2 SECONDS (ref 11.7–14.5)
SODIUM BLD-SCNC: 140 MMOL/L (ref 135–145)
TOTAL PROTEIN: 6.2 GM/DL (ref 6.4–8.2)

## 2020-03-17 PROCEDURE — 80053 COMPREHEN METABOLIC PANEL: CPT

## 2020-03-17 PROCEDURE — 85610 PROTHROMBIN TIME: CPT

## 2020-03-17 PROCEDURE — 36415 COLL VENOUS BLD VENIPUNCTURE: CPT

## 2020-03-18 ENCOUNTER — APPOINTMENT (OUTPATIENT)
Dept: NUCLEAR MEDICINE | Age: 70
DRG: 378 | End: 2020-03-18
Payer: MEDICARE

## 2020-03-18 ENCOUNTER — HOSPITAL ENCOUNTER (INPATIENT)
Age: 70
LOS: 3 days | Discharge: NURSING, REHAB OR LONG-TERM CARE | DRG: 378 | End: 2020-03-21
Attending: EMERGENCY MEDICINE | Admitting: HOSPITALIST
Payer: MEDICARE

## 2020-03-18 ENCOUNTER — APPOINTMENT (OUTPATIENT)
Dept: CT IMAGING | Age: 70
DRG: 378 | End: 2020-03-18
Payer: MEDICARE

## 2020-03-18 PROBLEM — K62.5 RECTAL BLEEDING: Status: ACTIVE | Noted: 2020-03-18

## 2020-03-18 LAB
ABO/RH: NORMAL
ALBUMIN SERPL-MCNC: 4 GM/DL (ref 3.4–5)
ALP BLD-CCNC: 79 IU/L (ref 40–129)
ALT SERPL-CCNC: 56 U/L (ref 10–40)
ANION GAP SERPL CALCULATED.3IONS-SCNC: 16 MMOL/L (ref 4–16)
ANTIBODY SCREEN: NEGATIVE
APTT: 31.3 SECONDS (ref 25.1–37.1)
AST SERPL-CCNC: 73 IU/L (ref 15–37)
BASOPHILS ABSOLUTE: 0.1 K/CU MM
BASOPHILS RELATIVE PERCENT: 0.2 % (ref 0–1)
BILIRUB SERPL-MCNC: 0.5 MG/DL (ref 0–1)
BUN BLDV-MCNC: 43 MG/DL (ref 6–23)
CALCIUM SERPL-MCNC: 9.3 MG/DL (ref 8.3–10.6)
CHLORIDE BLD-SCNC: 90 MMOL/L (ref 99–110)
CO2: 28 MMOL/L (ref 21–32)
CREAT SERPL-MCNC: 0.8 MG/DL (ref 0.9–1.3)
DIFFERENTIAL TYPE: ABNORMAL
EOSINOPHILS ABSOLUTE: 0 K/CU MM
EOSINOPHILS RELATIVE PERCENT: 0 % (ref 0–3)
GFR AFRICAN AMERICAN: >60 ML/MIN/1.73M2
GFR NON-AFRICAN AMERICAN: >60 ML/MIN/1.73M2
GLUCOSE BLD-MCNC: 193 MG/DL (ref 70–99)
HCT VFR BLD CALC: 37.5 % (ref 42–52)
HCT VFR BLD CALC: 47.3 % (ref 42–52)
HEMOGLOBIN: 11.4 GM/DL (ref 13.5–18)
HEMOGLOBIN: 15.6 GM/DL (ref 13.5–18)
IMMATURE NEUTROPHIL %: 1.5 % (ref 0–0.43)
INR BLD: 3.23 INDEX
LYMPHOCYTES ABSOLUTE: 1.4 K/CU MM
LYMPHOCYTES RELATIVE PERCENT: 5.8 % (ref 24–44)
MCH RBC QN AUTO: 30.9 PG (ref 27–31)
MCHC RBC AUTO-ENTMCNC: 33 % (ref 32–36)
MCV RBC AUTO: 93.7 FL (ref 78–100)
MONOCYTES ABSOLUTE: 1.3 K/CU MM
MONOCYTES RELATIVE PERCENT: 5.5 % (ref 0–4)
NUCLEATED RBC %: 0 %
PDW BLD-RTO: 13.2 % (ref 11.7–14.9)
PLATELET # BLD: 623 K/CU MM (ref 140–440)
PMV BLD AUTO: 9.6 FL (ref 7.5–11.1)
POTASSIUM SERPL-SCNC: 3.3 MMOL/L (ref 3.5–5.1)
PROTHROMBIN TIME: 39.5 SECONDS (ref 11.7–14.5)
RBC # BLD: 5.05 M/CU MM (ref 4.6–6.2)
SEGMENTED NEUTROPHILS ABSOLUTE COUNT: 21 K/CU MM
SEGMENTED NEUTROPHILS RELATIVE PERCENT: 87 % (ref 36–66)
SODIUM BLD-SCNC: 134 MMOL/L (ref 135–145)
TOTAL IMMATURE NEUTOROPHIL: 0.36 K/CU MM
TOTAL NUCLEATED RBC: 0 K/CU MM
TOTAL PROTEIN: 6.9 GM/DL (ref 6.4–8.2)
WBC # BLD: 24.2 K/CU MM (ref 4–10.5)

## 2020-03-18 PROCEDURE — 85014 HEMATOCRIT: CPT

## 2020-03-18 PROCEDURE — 78278 ACUTE GI BLOOD LOSS IMAGING: CPT

## 2020-03-18 PROCEDURE — 86900 BLOOD TYPING SEROLOGIC ABO: CPT

## 2020-03-18 PROCEDURE — 74177 CT ABD & PELVIS W/CONTRAST: CPT

## 2020-03-18 PROCEDURE — C9132 KCENTRA, PER I.U.: HCPCS | Performed by: EMERGENCY MEDICINE

## 2020-03-18 PROCEDURE — 86850 RBC ANTIBODY SCREEN: CPT

## 2020-03-18 PROCEDURE — 96376 TX/PRO/DX INJ SAME DRUG ADON: CPT

## 2020-03-18 PROCEDURE — 96375 TX/PRO/DX INJ NEW DRUG ADDON: CPT

## 2020-03-18 PROCEDURE — C9113 INJ PANTOPRAZOLE SODIUM, VIA: HCPCS | Performed by: EMERGENCY MEDICINE

## 2020-03-18 PROCEDURE — 85610 PROTHROMBIN TIME: CPT

## 2020-03-18 PROCEDURE — 96365 THER/PROPH/DIAG IV INF INIT: CPT

## 2020-03-18 PROCEDURE — 85018 HEMOGLOBIN: CPT

## 2020-03-18 PROCEDURE — 2580000003 HC RX 258: Performed by: EMERGENCY MEDICINE

## 2020-03-18 PROCEDURE — 85730 THROMBOPLASTIN TIME PARTIAL: CPT

## 2020-03-18 PROCEDURE — 6360000002 HC RX W HCPCS: Performed by: HOSPITALIST

## 2020-03-18 PROCEDURE — 86901 BLOOD TYPING SEROLOGIC RH(D): CPT

## 2020-03-18 PROCEDURE — 2000000000 HC ICU R&B

## 2020-03-18 PROCEDURE — 96366 THER/PROPH/DIAG IV INF ADDON: CPT

## 2020-03-18 PROCEDURE — 2580000003 HC RX 258: Performed by: HOSPITALIST

## 2020-03-18 PROCEDURE — 6360000002 HC RX W HCPCS: Performed by: EMERGENCY MEDICINE

## 2020-03-18 PROCEDURE — C9113 INJ PANTOPRAZOLE SODIUM, VIA: HCPCS | Performed by: HOSPITALIST

## 2020-03-18 PROCEDURE — 99291 CRITICAL CARE FIRST HOUR: CPT

## 2020-03-18 PROCEDURE — A9560 TC99M LABELED RBC: HCPCS | Performed by: HOSPITALIST

## 2020-03-18 PROCEDURE — 3430000000 HC RX DIAGNOSTIC RADIOPHARMACEUTICAL: Performed by: HOSPITALIST

## 2020-03-18 PROCEDURE — 6360000004 HC RX CONTRAST MEDICATION: Performed by: EMERGENCY MEDICINE

## 2020-03-18 PROCEDURE — G0378 HOSPITAL OBSERVATION PER HR: HCPCS

## 2020-03-18 PROCEDURE — 85025 COMPLETE CBC W/AUTO DIFF WBC: CPT

## 2020-03-18 PROCEDURE — 80053 COMPREHEN METABOLIC PANEL: CPT

## 2020-03-18 RX ORDER — DEXAMETHASONE 4 MG/1
4 TABLET ORAL 4 TIMES DAILY
Status: CANCELLED | OUTPATIENT
Start: 2020-03-18

## 2020-03-18 RX ORDER — ACETAMINOPHEN 325 MG/1
650 TABLET ORAL EVERY 6 HOURS PRN
Status: DISCONTINUED | OUTPATIENT
Start: 2020-03-18 | End: 2020-03-21 | Stop reason: HOSPADM

## 2020-03-18 RX ORDER — ONDANSETRON 2 MG/ML
4 INJECTION INTRAMUSCULAR; INTRAVENOUS ONCE
Status: COMPLETED | OUTPATIENT
Start: 2020-03-18 | End: 2020-03-18

## 2020-03-18 RX ORDER — PANTOPRAZOLE SODIUM 40 MG/10ML
40 INJECTION, POWDER, LYOPHILIZED, FOR SOLUTION INTRAVENOUS 2 TIMES DAILY
Status: DISCONTINUED | OUTPATIENT
Start: 2020-03-18 | End: 2020-03-20

## 2020-03-18 RX ORDER — SODIUM CHLORIDE 0.9 % (FLUSH) 0.9 %
10 SYRINGE (ML) INJECTION EVERY 12 HOURS SCHEDULED
Status: DISCONTINUED | OUTPATIENT
Start: 2020-03-18 | End: 2020-03-21 | Stop reason: HOSPADM

## 2020-03-18 RX ORDER — ACETAMINOPHEN 650 MG/1
650 SUPPOSITORY RECTAL EVERY 6 HOURS PRN
Status: DISCONTINUED | OUTPATIENT
Start: 2020-03-18 | End: 2020-03-21 | Stop reason: HOSPADM

## 2020-03-18 RX ORDER — GABAPENTIN 300 MG/1
300 CAPSULE ORAL 3 TIMES DAILY
Status: CANCELLED | OUTPATIENT
Start: 2020-03-18

## 2020-03-18 RX ORDER — DEXAMETHASONE SODIUM PHOSPHATE 4 MG/ML
4 INJECTION, SOLUTION INTRA-ARTICULAR; INTRALESIONAL; INTRAMUSCULAR; INTRAVENOUS; SOFT TISSUE EVERY 6 HOURS
Status: DISCONTINUED | OUTPATIENT
Start: 2020-03-18 | End: 2020-03-20

## 2020-03-18 RX ORDER — DOXYCYCLINE HYCLATE 100 MG
100 TABLET ORAL 2 TIMES DAILY
Status: DISCONTINUED | OUTPATIENT
Start: 2020-03-18 | End: 2020-03-21 | Stop reason: HOSPADM

## 2020-03-18 RX ORDER — SODIUM CHLORIDE 0.9 % (FLUSH) 0.9 %
10 SYRINGE (ML) INJECTION PRN
Status: DISCONTINUED | OUTPATIENT
Start: 2020-03-18 | End: 2020-03-21 | Stop reason: HOSPADM

## 2020-03-18 RX ORDER — 0.9 % SODIUM CHLORIDE 0.9 %
1000 INTRAVENOUS SOLUTION INTRAVENOUS ONCE
Status: COMPLETED | OUTPATIENT
Start: 2020-03-18 | End: 2020-03-18

## 2020-03-18 RX ORDER — ONDANSETRON 2 MG/ML
4 INJECTION INTRAMUSCULAR; INTRAVENOUS EVERY 6 HOURS PRN
Status: DISCONTINUED | OUTPATIENT
Start: 2020-03-18 | End: 2020-03-21 | Stop reason: HOSPADM

## 2020-03-18 RX ORDER — PROMETHAZINE HYDROCHLORIDE 25 MG/1
12.5 TABLET ORAL EVERY 6 HOURS PRN
Status: DISCONTINUED | OUTPATIENT
Start: 2020-03-18 | End: 2020-03-21 | Stop reason: HOSPADM

## 2020-03-18 RX ORDER — SODIUM CHLORIDE 9 MG/ML
50 INJECTION, SOLUTION INTRAVENOUS ONCE
Status: COMPLETED | OUTPATIENT
Start: 2020-03-18 | End: 2020-03-18

## 2020-03-18 RX ORDER — PANTOPRAZOLE SODIUM 40 MG/10ML
40 INJECTION, POWDER, LYOPHILIZED, FOR SOLUTION INTRAVENOUS ONCE
Status: COMPLETED | OUTPATIENT
Start: 2020-03-18 | End: 2020-03-18

## 2020-03-18 RX ADMIN — PANTOPRAZOLE SODIUM 40 MG: 40 INJECTION, POWDER, FOR SOLUTION INTRAVENOUS at 21:26

## 2020-03-18 RX ADMIN — Medication 1000 UNITS: at 09:31

## 2020-03-18 RX ADMIN — DEXAMETHASONE SODIUM PHOSPHATE 4 MG: 4 INJECTION, SOLUTION INTRAMUSCULAR; INTRAVENOUS at 15:39

## 2020-03-18 RX ADMIN — DEXAMETHASONE SODIUM PHOSPHATE 4 MG: 4 INJECTION, SOLUTION INTRAMUSCULAR; INTRAVENOUS at 21:26

## 2020-03-18 RX ADMIN — PHYTONADIONE 10 MG: 10 INJECTION, EMULSION INTRAMUSCULAR; INTRAVENOUS; SUBCUTANEOUS at 09:47

## 2020-03-18 RX ADMIN — LEVETIRACETAM 750 MG: 100 INJECTION, SOLUTION INTRAVENOUS at 15:39

## 2020-03-18 RX ADMIN — SODIUM CHLORIDE 1000 ML: 9 INJECTION, SOLUTION INTRAVENOUS at 08:54

## 2020-03-18 RX ADMIN — Medication 26.8 MILLICURIE: at 14:00

## 2020-03-18 RX ADMIN — PANTOPRAZOLE SODIUM 40 MG: 40 INJECTION, POWDER, FOR SOLUTION INTRAVENOUS at 08:54

## 2020-03-18 RX ADMIN — SODIUM CHLORIDE, PRESERVATIVE FREE 10 ML: 5 INJECTION INTRAVENOUS at 21:26

## 2020-03-18 RX ADMIN — SODIUM CHLORIDE 50 ML: 9 INJECTION, SOLUTION INTRAVENOUS at 09:48

## 2020-03-18 RX ADMIN — IOPAMIDOL 75 ML: 755 INJECTION, SOLUTION INTRAVENOUS at 10:25

## 2020-03-18 RX ADMIN — PANTOPRAZOLE SODIUM 40 MG: 40 INJECTION, POWDER, FOR SOLUTION INTRAVENOUS at 15:39

## 2020-03-18 RX ADMIN — ONDANSETRON 4 MG: 2 INJECTION INTRAMUSCULAR; INTRAVENOUS at 11:06

## 2020-03-18 ASSESSMENT — PAIN DESCRIPTION - LOCATION: LOCATION: ABDOMEN

## 2020-03-18 ASSESSMENT — PAIN SCALES - GENERAL
PAINLEVEL_OUTOF10: 0
PAINLEVEL_OUTOF10: 4

## 2020-03-18 NOTE — ED NOTES
Pt wife Ilan Merlos and Camilo Crystal can be reached at [phone]. She also reports that pt has had increasing weakness and confusion, Mri of brain recently that shows scar tissue from gamma knife surgery for brain cancer according to wife. Pt also dx with uti 3-.       Jenniffer Ragsdale, SEAN  03/18/20 [id number]

## 2020-03-18 NOTE — CONSULTS
times daily   Yes Historical Provider, MD   Multiple Vitamins-Minerals (MULTIVITAMIN ADULT PO) Take 1 tablet by mouth daily   Yes Historical Provider, MD   amLODIPine (NORVASC) 10 MG tablet Take 1 tablet by mouth daily 3/10/20  Yes Claudia Mondragon MD   hydroCHLOROthiazide (HYDRODIURIL) 50 MG tablet Take 1 tablet by mouth daily 3/10/20  Yes Claudia Mondragon MD   doxycycline hyclate (VIBRAMYCIN) 100 MG capsule Take 100 mg by mouth 2 times daily   Yes Historical Provider, MD   polyethylene glycol (GLYCOLAX) powder Take 17 g by mouth daily as needed   Yes Historical Provider, MD   dexamethasone (DECADRON) 4 MG tablet Take 4 mg by mouth 4 times daily    Yes Historical Provider, MD   dronabinol (MARINOL) 5 MG capsule Take 5 mg by mouth 2 times daily (before meals). Yes Historical Provider, MD   acetaminophen (TYLENOL) 500 MG tablet Take 500 mg by mouth every 4 hours as needed for Pain   Yes Historical Provider, MD   levETIRAcetam (KEPPRA) 750 MG tablet Take 1 tablet by mouth 2 times daily 7/29/19  Yes Claudia Mondragon MD   gabapentin (NEURONTIN) 300 MG capsule Take 300 mg by mouth 3 times daily. Yes Historical Provider, MD   mirtazapine (REMERON) 45 MG tablet Take 45 mg by mouth nightly    Yes Historical Provider, MD   b complex vitamins capsule Take 1 capsule by mouth daily   Yes Historical Provider, MD   senna-docusate (PERICOLACE) 8.6-50 MG per tablet Take 1 tablet by mouth as needed    Yes Historical Provider, MD   Lactobacillus (PROBIOTIC ACIDOPHILUS PO) Take 1 capsule by mouth every morning Over The Counter    Yes Historical Provider, MD   Vitamins A & D (VITAMIN A & D) ointment Apply topically 2 times daily 10/03/19 Apply topically to gluteal area every shift    Historical Provider, MD   menthol-zinc oxide (CALMOSEPTINE) 0.44-20.625 % OINT ointment Apply topically daily 1/27/2020 Apply to right heel daily    Historical Provider, MD       Allergies:     Allergies   Allergen Reactions    Niacin And Related Hives  Furosemide    . Social History:    TOBACCO:  No  ETOH:  No    Family History:   Family History   Problem Relation Age of Onset    Alzheimer's Disease Mother     Other Brother         Acid Reflux       REVIEW OF SYSTEMS: (POSITIVES WILL BE UNDERLINED)  CONSTITUTIONAL:    Weight change,fatigue, fever, chills  EYES:  Diplopia, change in vision  EARS:  hearing loss, tinnitus, vertigo  NOSE:   epistaxis  MOUTH/THROAT:     hoarseness, sore throat. RESPIRATORY:  SOB,  cough, sputum, hemoptysis  CARDIOVASCULAR : chest pain,palpitations, dyspnea exertion, orthopnea, paroxysmal nocturnal dyspnea, pedal edema. GASTROINTESTINAL:  See HPI  GENITOURINARY:   dysuria, hematuria, . HEMATOLOGIC/LYMPHATIC:   Anemia, bleeding tendencies. MUSCULOSKELETAL:    myalgias,  joint pain  NEUROLOGICAL:   Loss of Consciousness, paresthesias, anesthesias, focal weakness  SKIN :   History of dermatitis, rashes  PSYCHIATRIC:  depression, , anxiety past psychosis  ENDOCRINE:  History of diabetes, thyroid disease  ALL/IMM : allergies, rashes    PHYSICAL EXAM:    Vitals:  BP (!) 141/128   Pulse 83   Temp 97.5 °F (36.4 °C) (Oral)   Resp 15   SpO2 100%   CONSTITUTIONAL: alert, cooperative, no apparent distress,   EYES:  pupils equal, round and reactive to light and sclera clear  ENT:  normocepalic, without obvious abnormality  NECK:  supple, symmetrical, trachea midline  HEMATOLOGIC/LYMPHATICS:  no cervical lymphadenopathy and no supraclavicular lymphadenopathy  LUNGS:  clear to auscultation  CARDIOVASCULAR:  regular rate and rhythm and no murmur noted  ABDOMEN:  Soft, non-tender with normal bowel sounds.  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 2) await response of reversing anticoag  3) high-dose PPI and monitor H/H  4) if bleeding continues would do GI bleed nuclear scan        Haris Vicente M.D

## 2020-03-18 NOTE — ED NOTES
Pt changed. Pt is still having remarkable amount of bleeding from rectum. Dr. Simeon Chance notified.       Bereket Hines, RN  03/18/20 [id number]

## 2020-03-18 NOTE — ED PROVIDER NOTES
eMERGENCY dEPARTMENT eNCOUnter      CHIEF COMPLAINT:   Rectal bleeding  Abdominal pain    CRITICAL CARE NOTE:  There was a high probability of clinically significant life-threatening deterioration of the patient's condition requiring my urgent intervention. Total critical care time is 30 minutes  This includes vital sign monitoring, pulse oximetry monitoring, telemetry monitoring, clinical response to the IV medications, reviewing the nursing notes, consultation time, dictation/documetation time. (This time excludes time spent performing procedures). HPI: Major Patel is a 68 y.o. male who presents to the emergency department for evaluation of rectal bleeding. The patient states that he started bleeding this morning. He is continued to have several episodes. The blood is dark red in color and liquid. It was initially mixed with some stool but has now become just blood. It is associated with lower abdominal cramping. He has been nauseated. Symptoms are intermittent. There are no exacerbating or alleviating factors. The patient has a history of a right lower extremity DVT and is anticoagulated on Coumadin. 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 He was recently treated for diverticulitis and a UTI. He denies fevers, chills, chest pain, cough, shortness of breath, vomiting or any other complaints.     REVIEW OF SYSTEMS:   Constitutional:  Denies fever or chills  Eyes:  Denies change in visual acuity  HENT:  Denies nasal congestion or sore throat  Respiratory:  Denies cough or shortness of breath  Cardiovascular:  Denies chest pain or edema  GI: See HPI  :  Denies dysuria  Musculoskeletal:  Denies back pain or joint pain  Integument:  Denies rash  Neurologic:  Denies headache, focal weakness or sensory changes  \"Remaining review of systems reviewed and negative. I have reviewed the nursing triage documentation and agree unless otherwise noted below. \"      PAST Intact distal pulses, No edema, No tenderness  Neurologic:  Alert & oriented x 3, Normal motor function, Sensation intact to light touch throughout, No focal deficits  Skin:  Warm, Dry, No erythema, No rash      EKG Interpretation  None    Radiology / Procedures:  CT ABDOMEN PELVIS W IV CONTRAST Additional Contrast? None (Final result)   Result time 03/18/20 10:40:44   Final result by Vidhya Waldron MD (03/18/20 10:40:44)                Impression:    1. Colonic diverticulosis.  Inflammatory changes associated with  diverticulitis of the descending colon on prior CT have resolved.  No new  inflammation. 2. Multiple hypovascular liver lesions measuring up to 3.4 cm suspicious for  metastatic disease.  These were not well-visualized on previous noncontrast  CTs.  Recommend correlation with biopsy. Narrative:    EXAMINATION:  CT OF THE ABDOMEN AND PELVIS WITH CONTRAST 3/18/2020 9:30 am    TECHNIQUE:  CT of the abdomen and pelvis was performed with the administration of 75 mL  Isovue 370 intravenous contrast. Multiplanar reformatted images are provided  for review. Dose modulation, iterative reconstruction, and/or weight based  adjustment of the mA/kV was utilized to reduce the radiation dose to as low  as reasonably achievable. COMPARISON:  03/03/2020    HISTORY:  Acute abdominal pain with leukocytosis.  Patient has bladder cancer. FINDINGS:  Lower Chest: Mild bibasilar atelectasis/scarring. Organs: Within the liver, there are multiple hypovascular lesions measuring  up to 3.4 cm which were not well-visualized on previous noncontrast CTs. Spleen, pancreas, adrenals, and gallbladder are unremarkable.  Symmetric  enhancement of the kidneys without hydronephrosis. GI/Bowel: Stomach and small bowel are unremarkable.  Normal appendix. Diverticulosis throughout the colon.  Inflammatory changes associated with  diverticulitis of the descending colon seen on prior CT have resolved.     Pelvis: Status post cystoprostatectomy with ileal loop conduit.  No  lymphadenopathy or free fluid. Peritoneum/Retroperitoneum: No lymphadenopathy or ascites.  Moderate to  severe atherosclerotic disease without abdominal aortic aneurysm.  IVC filter. Bones/Soft Tissues: No suspicious osseous lesions.  Scattered degenerative  changes of the spine. Labs Reviewed   CBC WITH AUTO DIFFERENTIAL - Abnormal; Notable for the following components:       Result Value    WBC 24.2 (*)     Platelets 909 (*)     Segs Relative 87.0 (*)     Lymphocytes % 5.8 (*)     Monocytes % 5.5 (*)     Immature Neutrophil % 1.5 (*)     All other components within normal limits   COMPREHENSIVE METABOLIC PANEL - Abnormal; Notable for the following components:    Sodium 134 (*)     Potassium 3.3 (*)     Chloride 90 (*)     BUN 43 (*)     CREATININE 0.8 (*)     Glucose 193 (*)     ALT 56 (*)     AST 73 (*)     All other components within normal limits   PROTIME/INR & PTT - Abnormal; Notable for the following components:    Protime 39.5 (*)     All other components within normal limits   TYPE AND SCREEN       ED COURSE & MEDICAL DECISION MAKING:  Pertinent Labs & Imaging studies reviewed. (See chart for details)  On exam, the patient is afebrile and nontoxic appearing. He is hemodynamically stable and neurologically intact. He continues to have a large amount of dark red blood per rectum and has to be cleaned up several times. Labs are obtained and are significant for leukocytosis, renal insufficiency, mildly elevated liver enzymes and an INR of 3.23. CT abdomen and pelvis shows colonic diverticulosis. Inflammatory changes with diverticulitis of the ascending colon on prior CT have resolved. There is no new inflammation. There are multiple hypovascular liver lesions measuring up to 3.4 cm suspicious for metastatic disease. Recommend correlation with biopsy.  The patient was treated with 1 L of IV normal saline, Protonix, Zofran, K Centron

## 2020-03-18 NOTE — H&P
History and Physical      Name:  Oralia Calle /Age/Sex: 1950  (68 y.o. male)   MRN & CSN:  [id number] & [id number] Admission Date/Time: 3/18/2020  8:16 AM   Location:  ED17/ED-17 PCP: Poppy Carey MD       Hospital Day: 1    Assessment and Plan:   Oralia Calle is a 68 y.o.  male  who presents with Rectal bleed    > Rectal bleed  - CT abd with diverticulosis, resolved diverticulitis,   - admit, NPO, Hold eliquis, H&H, IV PPI, bleeding scan, consult GI    > Right LE DVT with 2019 s/p IVC filter - on eliquis, Held, given Vit K and Kcentra in ED due to GI bleed  > H/o MRSA, UTI - c/w oral Vibramycin, contact isolation. > Seizures - c/w Keppra, seizure precautions.    > Failure to Thrive  -Generalized weakness/deconditioning with metastatic cancer  - pt is DNR CCA.      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 Neuropathy    Diet Diet NPO Effective Now Exceptions are: Other (See Comments)   DVT Prophylaxis Hold eliquis, INR 2 given Vit K and Kcentra due to GI bleed   Code Status Prior     History of Present Illness:     Chief Complaint: Rectal bleed  Oralia Calle is a 68 y.o.  male  who presents with Rectal bleed     Pt is poor historian. Pt was brought [facility] ED from SN[facility] with rectal bleed started this am , liquid stool, mixed with stool then just bleed. Associated with abd cramping, pt was treated recently for Diverticulitis, on eliquis for h/o DVT. With h/o metastatic bladder cancer with brain mets.      10-14 point ROS reviewed negative, unless as noted above     Objective:   No intake or output data in the 24 hours ending 20 1142   Vitals:   Vitals:    20 1030   BP: (!) 144/99   Pulse: 88   Resp: 14   Temp: 97.9 °F (36.6 °C)   SpO2: 100%     Physical Exam: History    Marital status: Life Partner     Spouse name: None    Number of children: None    Years of education: None    Highest education level: None   Occupational History    None   Social Needs    Financial resource strain: None    Food insecurity     Worry: None     Inability: None    Transportation needs     Medical: None     Non-medical: None   Tobacco Use    Smoking status: Former Smoker     Packs/day: 2.00     Years: 11.00     Pack years: 22.00     Types: Cigarettes     Start date:      Last attempt to quit:      Years since quittin.2    Smokeless tobacco: Never Used   Substance and Sexual Activity    Alcohol use: Yes     Comment: \"A Couple Times A Year\"    Drug use: No    Sexual activity: Not Currently   Lifestyle    Physical activity     Days per week: None     Minutes per session: None    Stress: None   Relationships    Social connections     Talks on phone: None     Gets together: None     Attends Restorationism service: None     Active member of club or organization: None     Attends meetings of clubs or organizations: None     Relationship status: None    Intimate partner violence     Fear of current or ex partner: None     Emotionally abused: None     Physically abused: None     Forced sexual activity: None   Other Topics Concern    None   Social History Narrative    None       Medications:   Medications:   Prior to Admission medications    Medication Sig Start Date End Date Taking?  Authorizing Provider   apixaban (ELIQUIS) 2.5 MG TABS tablet Take 2.5 mg by mouth 2 times daily   Yes Historical Provider, MD   Multiple Vitamins-Minerals (MULTIVITAMIN ADULT PO) Take 1 tablet by mouth daily   Yes Historical Provider, MD   amLODIPine (NORVASC) 10 MG tablet Take 1 tablet by mouth daily 3/10/20  Yes Solis Chamberlain MD   hydroCHLOROthiazide (HYDRODIURIL) 50 MG tablet Take 1 tablet by mouth daily 3/10/20  Yes Solis Chamberlain MD   doxycycline hyclate (VIBRAMYCIN) 100 MG capsule Take 100 mg by mouth 2 times daily   Yes Historical Provider, MD   polyethylene glycol (GLYCOLAX) powder Take 17 g by mouth daily as needed   Yes Historical Provider, MD   dexamethasone (DECADRON) 4 MG tablet Take 4 mg by mouth 4 times daily    Yes Historical Provider, MD   dronabinol (MARINOL) 5 MG capsule Take 5 mg by mouth 2 times daily (before meals). Yes Historical Provider, MD   acetaminophen (TYLENOL) 500 MG tablet Take 500 mg by mouth every 4 hours as needed for Pain   Yes Historical Provider, MD   levETIRAcetam (KEPPRA) 750 MG tablet Take 1 tablet by mouth 2 times daily 7/29/19  Yes Maxine Dancer, MD   gabapentin (NEURONTIN) 300 MG capsule Take 300 mg by mouth 3 times daily.    Yes Historical Provider, MD   mirtazapine (REMERON) 45 MG tablet Take 45 mg by mouth nightly    Yes Historical Provider, MD   b complex vitamins capsule Take 1 capsule by mouth daily   Yes Historical Provider, MD   senna-docusate (PERICOLACE) 8.6-50 MG per tablet Take 1 tablet by mouth as needed    Yes Historical Provider, MD   Lactobacillus (PROBIOTIC ACIDOPHILUS PO) Take 1 capsule by mouth every morning Over The Counter    Yes Historical Provider, MD   Vitamins A & D (VITAMIN A & D) ointment Apply topically 2 times daily 10/03/19 Apply topically to gluteal area every shift    Historical Provider, MD   menthol-zinc oxide (CALMOSEPTINE) 0.44-20.625 % OINT ointment Apply topically daily 1/27/2020 Apply to right heel daily    Historical Provider, MD      Infusions:   PRN Meds:       Electronically signed by Cindy Pepper MD on 3/18/2020 at 11:42 AM

## 2020-03-18 NOTE — DISCHARGE INSTR - COC
Last Indicated Last Indicated By Review Planned Expiration Resolved Resolved By    MRSA 20 Culture, Urine        MDRO (multi-drug resistant organism)  19 Lana Cardona RN        19 Enterobacter cloacae urine          Nurse Assessment:  Last Vital Signs: BP (!) 141/128   Pulse 83   Temp 97.5 °F (36.4 °C) (Oral)   Resp 15   SpO2 100%     Last documented pain score (0-10 scale): Pain Level: 4  Last Weight:   Wt Readings from Last 1 Encounters:   20 184 lb (83.5 kg)     Mental Status:  {IP PT MENTAL STATUS:}    IV Access:  { HARSHA IV ACCESS:800589854}    Nursing Mobility/ADLs:  Walking   {[facility] DME CYNA:646122126}  Transfer  {[facility] DME KZCT:710426090}  Bathing  {[facility] DME IRUI:437978755}  Dressing  {[facility] DME RKPX:666005675}  Toileting  {[facility] DME GREV:321600200}  Feeding  {[facility] DME SZAV:325074240}  Med Admin  {[facility] DME NRJ}  Med Delivery   { HARSHA MED Delivery:707532106}    Wound Care Documentation and Therapy:        Elimination:  Continence:   · Bowel: {YES / BX:22521}  · Bladder: {YES / NQ:46924}  Urinary Catheter: {Urinary Catheter:465812314}   Colostomy/Ileostomy/Ileal Conduit: {YES / WX:62459}       Date of Last BM: ***    Intake/Output Summary (Last 24 hours) at 3/18/2020 1524  Last data filed at 3/18/2020 1239  Gross per 24 hour   Intake --   Output 300 ml   Net -300 ml     I/O last 3 completed shifts:  In: -   Out: 300 [Urine:300]    Safety Concerns:     [facility] Safety Concerns:886089011}    Impairments/Disabilities:      [facility] Impairments/Disabilities:393963333}    Patient's personal belongings (please select all that are sent with patient):  {[facility] DME Belongings:122755725}    RN SIGNATURE:  {Esignature:926374371}    CASE MANAGEMENT/SOCIAL WORK SECTION    Inpatient Status Date: ***    Readmission Risk Assessment Score:  Readmission Risk              Risk of Unplanned Readmission:        20           Discharging to Facility/ Agency   · Name: · Address:  · Phone:  · Fax:      / signature: {Esignature:973692785}    PHYSICIAN SECTION    Nutrition Therapy:  Current Nutrition Therapy:   [facility] HARSHA Diet List:071951986}    Routes of Feeding: {CHP DME Other Feedings:376290068}  Liquids: {Slp liquid thickness:40405}  Daily Fluid Restriction: {CHP DME Yes amt example:359820597}  Last Modified Barium Swallow with Video (Video Swallowing Test): {Done Not Done DHBD:444750309}    Treatments at the Time of Hospital Discharge:   Respiratory Treatments: ***  Oxygen Therapy:  {Therapy; copd oxygen:93966}  Ventilator:    {[facility] Vent LMPV:848356235}    Rehab Therapies: {THERAPEUTIC INTERVENTION:9078983013}  Weight Bearing Status/Restrictions: { CC Weight Bearin}  Other Medical Equipment (for information only, NOT a DME order):  {EQUIPMENT:652436497}  Other Treatments: ***    Prognosis: {Prognosis:9294422511}    Condition at Discharge: 50Joel Delgado Patient Condition:740402805}    Rehab Potential (if transferring to Rehab): {Prognosis:6725796435}    Recommended Labs or Other Treatments After Discharge: ***    Physician Certification: I certify the above information and transfer of Laura Kingsley  is necessary for the continuing treatment of the diagnosis listed and that he requires {Admit to Appropriate Level of Care:02622} for {GREATER/LESS:755631225} 30 days.      Update Admission H&P: {CHP DME Changes in CKPHD:139509860}    PHYSICIAN SIGNATURE:  {Esignature:087584762}

## 2020-03-18 NOTE — PROGRESS NOTES
Yes Historical Provider, MD   senna-docusate (PERICOLACE) 8.6-50 MG per tablet Take 1 tablet by mouth as needed    Yes Historical Provider, MD   Lactobacillus (PROBIOTIC ACIDOPHILUS PO) Take 1 capsule by mouth every morning Over The Counter    Yes Historical Provider, MD   Vitamins A & D (VITAMIN A & D) ointment Apply topically 2 times daily 10/03/19 Apply topically to gluteal area every shift    Historical Provider, MD   menthol-zinc oxide (CALMOSEPTINE) 0.44-20.625 % OINT ointment Apply topically daily 1/27/2020 Apply to right heel daily    Historical Provider, MD     Medications added or changed (ex. new medication, dosage change, interval change, formulation change): Warfarin changed to Eliquis   MVI daily (added)  Dexamethasone dosage changed to 4 mg 4 times daily    Comments:  Medication list provided by Thomas Davila out to facility per nursing patient has received no medications today. Reports yesterday as scheduled.     To my knowledge the above medication history is accurate as of 3/18/2020 11:17 AM.   Arely Roblero CPhT   3/18/2020 11:17 AM

## 2020-03-19 LAB
ANION GAP SERPL CALCULATED.3IONS-SCNC: 15 MMOL/L (ref 4–16)
BUN BLDV-MCNC: 48 MG/DL (ref 6–23)
CALCIUM SERPL-MCNC: 8.9 MG/DL (ref 8.3–10.6)
CHLORIDE BLD-SCNC: 98 MMOL/L (ref 99–110)
CO2: 27 MMOL/L (ref 21–32)
CREAT SERPL-MCNC: 0.9 MG/DL (ref 0.9–1.3)
GFR AFRICAN AMERICAN: >60 ML/MIN/1.73M2
GFR NON-AFRICAN AMERICAN: >60 ML/MIN/1.73M2
GLUCOSE BLD-MCNC: 181 MG/DL (ref 70–99)
HCT VFR BLD CALC: 29.3 % (ref 42–52)
HCT VFR BLD CALC: 33.3 % (ref 42–52)
HCT VFR BLD CALC: 34.9 % (ref 42–52)
HEMOGLOBIN: 10.7 GM/DL (ref 13.5–18)
HEMOGLOBIN: 11.5 GM/DL (ref 13.5–18)
HEMOGLOBIN: 9.7 GM/DL (ref 13.5–18)
POTASSIUM SERPL-SCNC: 3.8 MMOL/L (ref 3.5–5.1)
SODIUM BLD-SCNC: 140 MMOL/L (ref 135–145)

## 2020-03-19 PROCEDURE — 6370000000 HC RX 637 (ALT 250 FOR IP): Performed by: HOSPITALIST

## 2020-03-19 PROCEDURE — G0378 HOSPITAL OBSERVATION PER HR: HCPCS

## 2020-03-19 PROCEDURE — 6360000002 HC RX W HCPCS: Performed by: HOSPITALIST

## 2020-03-19 PROCEDURE — 2580000003 HC RX 258: Performed by: HOSPITALIST

## 2020-03-19 PROCEDURE — C9113 INJ PANTOPRAZOLE SODIUM, VIA: HCPCS | Performed by: HOSPITALIST

## 2020-03-19 PROCEDURE — 80048 BASIC METABOLIC PNL TOTAL CA: CPT

## 2020-03-19 PROCEDURE — 36415 COLL VENOUS BLD VENIPUNCTURE: CPT

## 2020-03-19 PROCEDURE — 85018 HEMOGLOBIN: CPT

## 2020-03-19 PROCEDURE — 99223 1ST HOSP IP/OBS HIGH 75: CPT | Performed by: INTERNAL MEDICINE

## 2020-03-19 PROCEDURE — 96376 TX/PRO/DX INJ SAME DRUG ADON: CPT

## 2020-03-19 PROCEDURE — 1200000000 HC SEMI PRIVATE

## 2020-03-19 PROCEDURE — 96366 THER/PROPH/DIAG IV INF ADDON: CPT

## 2020-03-19 PROCEDURE — 99211 OFF/OP EST MAY X REQ PHY/QHP: CPT

## 2020-03-19 PROCEDURE — 85014 HEMATOCRIT: CPT

## 2020-03-19 RX ADMIN — DEXAMETHASONE SODIUM PHOSPHATE 4 MG: 4 INJECTION, SOLUTION INTRAMUSCULAR; INTRAVENOUS at 16:39

## 2020-03-19 RX ADMIN — PANTOPRAZOLE SODIUM 40 MG: 40 INJECTION, POWDER, FOR SOLUTION INTRAVENOUS at 20:27

## 2020-03-19 RX ADMIN — LEVETIRACETAM 750 MG: 100 INJECTION, SOLUTION INTRAVENOUS at 02:27

## 2020-03-19 RX ADMIN — DOXYCYCLINE HYCLATE 100 MG: 100 TABLET, COATED ORAL at 09:44

## 2020-03-19 RX ADMIN — SODIUM CHLORIDE, PRESERVATIVE FREE 10 ML: 5 INJECTION INTRAVENOUS at 20:27

## 2020-03-19 RX ADMIN — PANTOPRAZOLE SODIUM 40 MG: 40 INJECTION, POWDER, FOR SOLUTION INTRAVENOUS at 09:44

## 2020-03-19 RX ADMIN — DEXAMETHASONE SODIUM PHOSPHATE 4 MG: 4 INJECTION, SOLUTION INTRAMUSCULAR; INTRAVENOUS at 09:44

## 2020-03-19 RX ADMIN — DOXYCYCLINE HYCLATE 100 MG: 100 TABLET, COATED ORAL at 20:27

## 2020-03-19 RX ADMIN — LEVETIRACETAM 750 MG: 100 INJECTION, SOLUTION INTRAVENOUS at 16:30

## 2020-03-19 RX ADMIN — SODIUM CHLORIDE, PRESERVATIVE FREE 10 ML: 5 INJECTION INTRAVENOUS at 09:45

## 2020-03-19 RX ADMIN — DEXAMETHASONE SODIUM PHOSPHATE 4 MG: 4 INJECTION, SOLUTION INTRAMUSCULAR; INTRAVENOUS at 05:49

## 2020-03-19 RX ADMIN — DEXAMETHASONE SODIUM PHOSPHATE 4 MG: 4 INJECTION, SOLUTION INTRAMUSCULAR; INTRAVENOUS at 22:15

## 2020-03-19 ASSESSMENT — PAIN SCALES - GENERAL
PAINLEVEL_OUTOF10: 0

## 2020-03-19 NOTE — PLAN OF CARE
Ongoing  Goal: Absence of imbalanced fluid volume signs and symptoms  Description: Absence of imbalanced fluid volume signs and symptoms  Outcome: Ongoing     Problem: Nausea/Vomiting:  Goal: Absence of nausea/vomiting  Description: Absence of nausea/vomiting  Outcome: Ongoing  Goal: Able to drink  Description: Able to drink  Outcome: Ongoing  Goal: Able to eat  Description: Able to eat  Outcome: Ongoing  Goal: Ability to achieve adequate nutritional intake will improve  Description: Ability to achieve adequate nutritional intake will improve  Outcome: Ongoing

## 2020-03-19 NOTE — CARE COORDINATION
Spoke with Dr Vanessa Lozano. Patient is confused and is unable to participate in conversation. Dr Vanessa Lozano has spoke to his wife and she would like to see the patient before decision is made regarding discharge destination. She is considering his return to home with hospice support (due to metastatic disease) vs return to [place]. Dr Vanessa Lozano will update CM after he speaks to spouse again.  Perri Flores RN

## 2020-03-19 NOTE — CONSULTS
parietal    COLONOSCOPY  Last Done In 2017    Polyps Removed In Past    CYSTOSCOPY  05/15/2018    Done At Dr. Chung Alaniz Right Early 4014'C    ENDOSCOPY, COLON, DIAGNOSTIC  2000    EYE SURGERY Bilateral 1983    \"Radial Kerotomy\"    OTHER SURGICAL HISTORY  05/21/2018    turbt    TUNNELED VENOUS PORT PLACEMENT Right 06/26/2018    DR Marie Chavarria, [facility]#[id number]    VASECTOMY  1982    WISDOM TOOTH EXTRACTION      4 [place] Teeth Extracted In Past       Current Medications:    Current Facility-Administered Medications: sodium chloride flush 0.9 % injection 10 mL, 10 mL, Intravenous, 2 times per day  sodium chloride flush 0.9 % injection 10 mL, 10 mL, Intravenous, PRN  acetaminophen (TYLENOL) tablet 650 mg, 650 mg, Oral, Q6H PRN **OR** acetaminophen (TYLENOL) suppository 650 mg, 650 mg, Rectal, Q6H PRN  promethazine (PHENERGAN) tablet 12.5 mg, 12.5 mg, Oral, Q6H PRN **OR** ondansetron (ZOFRAN) injection 4 mg, 4 mg, Intravenous, Q6H PRN  doxycycline hyclate (VIBRA-TABS) tablet 100 mg, 100 mg, Oral, BID  pantoprazole (PROTONIX) injection 40 mg, 40 mg, Intravenous, BID  levETIRAcetam (KEPPRA) 750 mg in sodium chloride 0.9 % 100 mL IVPB, 750 mg, Intravenous, Q12H  dexamethasone (DECADRON) injection 4 mg, 4 mg, Intravenous, Q6H    Allergies:  Niacin and related and Furosemide    Social History:    TOBACCO:   reports that he quit smoking about 45 years ago. His smoking use included cigarettes. He started smoking about 56 years ago. He has a 22.00 pack-year smoking history. He has never used smokeless tobacco.  ETOH:   reports current alcohol use. DRUGS:   reports no history of drug use. Family History:       Problem Relation Age of Onset    Alzheimer's Disease Mother     Other Brother         Acid Reflux     REVIEW OF SYSTEMS:    Weak. No melena this morning. No NVD. No fever or chills. No acute pain. Intermittently confused. The remainder of ROS is unremarkable.     PHYSICAL EXAM:      Vitals:    BP (!) 142/91   Pulse 88   Temp 97.3 °F (36.3 °C) (Oral)   Resp 18   Ht 5' 11\" (1.803 m)   Wt 186 lb 8.2 oz (84.6 kg)   SpO2 99%   BMI 26.01 kg/m²     CONSTITUTIONAL:  fatigued, alert, cooperative and no apparent distress  EYES:  extra-ocular muscles intact and sclera clear  NECK:  supple, symmetrical, trachea midline and no lymphadenopathy  LUNGS:  clear to auscultation, no crackles or wheezing  CARDIOVASCULAR:  normal S1 and S2 and no murmur noted  ABDOMEN:  normal bowel sounds, soft, non-distended and non-tender  MUSCULOSKELETAL:  No cyanosis. Weakness to RLE. NEUROLOGIC:  Cranial Nerves:  cranial nerves II-XII are grossly intact  SKIN:  Dry skin and no jaundice. No petechial rash. DATA:    Labs:  General Labs:    CBC with Differential:    Lab Results   Component Value Date    WBC 24.2 03/18/2020    RBC 5.05 03/18/2020    HGB 10.7 03/19/2020    HCT 33.3 03/19/2020     03/18/2020    MCV 93.7 03/18/2020    MCH 30.9 03/18/2020    MCHC 33.0 03/18/2020    RDW 13.2 03/18/2020    SEGSPCT 87.0 03/18/2020    BANDSPCT 3 01/16/2020    LYMPHOPCT 5.8 03/18/2020    MONOPCT 5.5 03/18/2020    BASOPCT 0.2 03/18/2020    MONOSABS 1.3 03/18/2020    LYMPHSABS 1.4 03/18/2020    EOSABS 0.0 03/18/2020    BASOSABS 0.1 03/18/2020    DIFFTYPE AUTOMATED DIFFERENTIAL 03/18/2020     CMP:    Lab Results   Component Value Date     03/19/2020    K 3.8 03/19/2020    CL 98 03/19/2020    CO2 27 03/19/2020    BUN 48 03/19/2020    CREATININE 0.9 03/19/2020    GFRAA >60 03/19/2020    LABGLOM >60 03/19/2020    GLUCOSE 181 03/19/2020    PROT 6.9 03/18/2020    LABALBU 4.0 03/18/2020    CALCIUM 8.9 03/19/2020    BILITOT 0.5 03/18/2020    ALKPHOS 79 03/18/2020    AST 73 03/18/2020    ALT 56 03/18/2020       IMPRESSION/RECOMMENDATIONS:      1. 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 S/p chemo and immunotherapy and WBRT + SBRT. Clinically I believe he has progression of disease with liver metastases.   Spouse felt the same way and considered home hospice. I talked to nurse supervisor at ICU so wife may have face time with Shira Jason since no visitor is allowed. 2. GIB related to [facility] and diverticular disease. Will monitor H/H. To continue with supportive care. Thanks for allowing me to participate in his care.

## 2020-03-19 NOTE — CONSULTS
[facility] Continence Nurse  Consult Note       Carmelita Umanzor  AGE: 68 y.o.    GENDER: male  : 1950  TODAY'S DATE:  3/19/2020    Subjective:     Reason for Evaluation and Assessment:  Wound care eval rt arm skin tear, urostomy       Carmelita Umanzor is a 68 y.o. male referred by:   [x] Physician  [] Nursing  [] Other:     Wound Identification:  Wound Type: skin tear  Contributing Factors: shear force        PAST MEDICAL HISTORY        Diagnosis Date    Arthritis     \"Neck\"    Cancer ([address]     \"found I have rare form of bladder cancer and seeing Dr Kacy Jo for chemo    Diverticulitis Dx In     \"One Episode\"    History of urostomy     Hypertension     Nocturia     Shortness of breath on exertion     Sleep apnea     No CPAP \"I Use A Mouth Piece\"    Teeth missing     Upper And Lower    Urinary frequency     Wears glasses     [place] teeth extracted     4 [place] Teeth Extracted In Past       PAST SURGICAL HISTORY    Past Surgical History:   Procedure Laterality Date    BRAIN SURGERY      gamma knife surgery left parietal    COLONOSCOPY  Last Done In     Polyps Removed In Past    CYSTOSCOPY  05/15/2018    Done At Dr. Balbir Wilson Right Early [address]    ENDOSCOPY, COLON, DIAGNOSTIC      EYE SURGERY Bilateral     \"Radial Kerotomy\"    OTHER SURGICAL HISTORY  2018    turbt    TUNNELED VENOUS PORT PLACEMENT Right 2018    DR Aruna Cadet, [facility]#[id number]    VASECTOMY  1982    WISDOM TOOTH EXTRACTION      4 [place] Teeth Extracted In Past       FAMILY HISTORY    Family History   Problem Relation Age of Onset    Alzheimer's Disease Mother     Other Brother         Acid Reflux       SOCIAL HISTORY    Social History     Tobacco Use    Smoking status: Former Smoker     Packs/day: 2.00     Years: 11.00     Pack years: 22.00     Types: Cigarettes     Start date:      Last attempt to quit: 1975     Years since quittin.2    Smokeless tobacco: Never Used   Substance Use Topics    Alcohol use: Yes     Comment: \"A Couple Times A Year\"    Drug use: No       ALLERGIES    Allergies   Allergen Reactions    Niacin And Related Hives    Furosemide        MEDICATIONS    No current facility-administered medications on file prior to encounter. Current Outpatient Medications on File Prior to Encounter   Medication Sig Dispense Refill    apixaban (ELIQUIS) 2.5 MG TABS tablet Take 2.5 mg by mouth 2 times daily      Multiple Vitamins-Minerals (MULTIVITAMIN ADULT PO) Take 1 tablet by mouth daily      amLODIPine (NORVASC) 10 MG tablet Take 1 tablet by mouth daily 30 tablet 3    hydroCHLOROthiazide (HYDRODIURIL) 50 MG tablet Take 1 tablet by mouth daily 30 tablet 3    doxycycline hyclate (VIBRAMYCIN) 100 MG capsule Take 100 mg by mouth 2 times daily      polyethylene glycol (GLYCOLAX) powder Take 17 g by mouth daily as needed      dexamethasone (DECADRON) 4 MG tablet Take 4 mg by mouth 4 times daily       dronabinol (MARINOL) 5 MG capsule Take 5 mg by mouth 2 times daily (before meals).  acetaminophen (TYLENOL) 500 MG tablet Take 500 mg by mouth every 4 hours as needed for Pain      levETIRAcetam (KEPPRA) 750 MG tablet Take 1 tablet by mouth 2 times daily 60 tablet 3    gabapentin (NEURONTIN) 300 MG capsule Take 300 mg by mouth 3 times daily.       mirtazapine (REMERON) 45 MG tablet Take 45 mg by mouth nightly       b complex vitamins capsule Take 1 capsule by mouth daily      senna-docusate (PERICOLACE) 8.6-50 MG per tablet Take 1 tablet by mouth as needed       Lactobacillus (PROBIOTIC ACIDOPHILUS PO) Take 1 capsule by mouth every morning Over The Counter       Vitamins A & D (VITAMIN A & D) ointment Apply topically 2 times daily 10/03/19 Apply topically to gluteal area every shift      menthol-zinc oxide (CALMOSEPTINE) 0.44-20.625 % OINT ointment Apply topically daily 1/27/2020 Apply to right heel daily           Objective:      BP (!) 142/91

## 2020-03-19 NOTE — PROGRESS NOTES
Hospitalist Progress Note      Name:  Chris Mattson /Age/Sex: 1950  (68 y.o. male)   MRN & CSN:  [id number] & [id number] Admission Date/Time: 3/18/2020  8:16 AM   Location:  -A PCP: Pk Powell MD         Hospital Day: 2    Assessment and Plan:   Chris Mattson is a 68 y.o.  male  who presents with Rectal bleeding    > Rectal bleed  - CT abd with diverticulosis, resolved diverticulitis,   -  Held eliquis, H&H, IV PPI, bleeding scan neg, consult GI  - most likely diverticular bleed complicated with hypercoagulable state, bleeding stopped  - started on liquid diet     > Right LE DVT with 2019 s/p IVC filter - on eliquis, Held, given Vit K and Kcentra in ED due to GI bleed  > H/o MRSA, UTI - c/w oral Vibramycin, contact isolation. > Seizures - c/w Keppra, seizure precautions.     > Failure to Thrive  -Generalized weakness/deconditioning with metastatic cancer  - pt is DNR CCA.      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 - possible new liver mets on CT  - oncology consulted, consider palliative care consult       Neuropathy    Diet DIET FULL LIQUID;   DVT Prophylaxis ? SCD. Eliquis held due to GI bleed   Code Status DNR-CCA   Disposition  pending clinical improvement     History of Present Illness:     Pt S&E. Discussed with significant other POA dx and plan of care. No more BM since yesterday, no abd pain, no chest pain, no dyspnea. 10-14 point ROS reviewed negative, unless as noted above    Objective:        Intake/Output Summary (Last 24 hours) at 3/19/2020 0945  Last data filed at 3/19/2020 0802  Gross per 24 hour   Intake 200 ml   Output 900 ml   Net -700 ml      Vitals:   Vitals:    20 0802   BP: 136/71   Pulse: 74   Resp: 12   Temp: 97.3 °F (36.3 °C)   SpO2: 98%     Physical Exam:      GEN    Awake male, sitting upright in bed in no apparent distress. Appears given age. RESP  Clear to auscultation, no wheezes, rales or rhonchi. Symmetric chest movement . CARDIO/VASC           S1/S2 auscultated. Regular rate . No peripheral edema. GI        Abdomen is soft without significant tenderness, or guarding. Bowel sounds are normoactive. Rectal exam deferred. Urostomy in place  MSK    No gross joint deformities. Generalized weakness, muscle atrophy  SKIN    Normal coloration, warm, dry. NEURO           impaired speech, no lateralizing weakness. PSYCH            Awake, alert, oriented x 4. Affect appropriate.       Medications:   Medications:    sodium chloride flush  10 mL Intravenous 2 times per day    doxycycline hyclate  100 mg Oral BID    pantoprazole  40 mg Intravenous BID    levetiracetam  750 mg Intravenous Q12H    dexamethasone  4 mg Intravenous Q6H      Infusions:   PRN Meds: sodium chloride flush, 10 mL, PRN  acetaminophen, 650 mg, Q6H PRN    Or  acetaminophen, 650 mg, Q6H PRN  promethazine, 12.5 mg, Q6H PRN    Or  ondansetron, 4 mg, Q6H PRN          Electronically signed by Lucyann Cheadle, MD on 3/19/2020 at 9:45 AM

## 2020-03-20 PROBLEM — C67.9 SMALL CELL CARCINOMA OF BLADDER (HCC): Status: ACTIVE | Noted: 2020-03-20

## 2020-03-20 LAB
ANION GAP SERPL CALCULATED.3IONS-SCNC: 9 MMOL/L (ref 4–16)
BUN BLDV-MCNC: 40 MG/DL (ref 6–23)
CALCIUM SERPL-MCNC: 8.1 MG/DL (ref 8.3–10.6)
CHLORIDE BLD-SCNC: 98 MMOL/L (ref 99–110)
CO2: 28 MMOL/L (ref 21–32)
CREAT SERPL-MCNC: 0.7 MG/DL (ref 0.9–1.3)
GFR AFRICAN AMERICAN: >60 ML/MIN/1.73M2
GFR NON-AFRICAN AMERICAN: >60 ML/MIN/1.73M2
GLUCOSE BLD-MCNC: 154 MG/DL (ref 70–99)
HCT VFR BLD CALC: 28.4 % (ref 42–52)
HEMOGLOBIN: 9.4 GM/DL (ref 13.5–18)
POTASSIUM SERPL-SCNC: 3.4 MMOL/L (ref 3.5–5.1)
SODIUM BLD-SCNC: 135 MMOL/L (ref 135–145)

## 2020-03-20 PROCEDURE — 1200000000 HC SEMI PRIVATE

## 2020-03-20 PROCEDURE — 97530 THERAPEUTIC ACTIVITIES: CPT

## 2020-03-20 PROCEDURE — 85018 HEMOGLOBIN: CPT

## 2020-03-20 PROCEDURE — G0378 HOSPITAL OBSERVATION PER HR: HCPCS

## 2020-03-20 PROCEDURE — C9113 INJ PANTOPRAZOLE SODIUM, VIA: HCPCS | Performed by: HOSPITALIST

## 2020-03-20 PROCEDURE — 85014 HEMATOCRIT: CPT

## 2020-03-20 PROCEDURE — 96376 TX/PRO/DX INJ SAME DRUG ADON: CPT

## 2020-03-20 PROCEDURE — 99231 SBSQ HOSP IP/OBS SF/LOW 25: CPT | Performed by: NURSE PRACTITIONER

## 2020-03-20 PROCEDURE — 97167 OT EVAL HIGH COMPLEX 60 MIN: CPT

## 2020-03-20 PROCEDURE — 80048 BASIC METABOLIC PNL TOTAL CA: CPT

## 2020-03-20 PROCEDURE — 97163 PT EVAL HIGH COMPLEX 45 MIN: CPT

## 2020-03-20 PROCEDURE — 94761 N-INVAS EAR/PLS OXIMETRY MLT: CPT

## 2020-03-20 PROCEDURE — 36415 COLL VENOUS BLD VENIPUNCTURE: CPT

## 2020-03-20 PROCEDURE — 96366 THER/PROPH/DIAG IV INF ADDON: CPT

## 2020-03-20 PROCEDURE — 6360000002 HC RX W HCPCS: Performed by: HOSPITALIST

## 2020-03-20 PROCEDURE — 6370000000 HC RX 637 (ALT 250 FOR IP): Performed by: HOSPITALIST

## 2020-03-20 PROCEDURE — 2580000003 HC RX 258: Performed by: HOSPITALIST

## 2020-03-20 RX ORDER — BACLOFEN 10 MG/1
5 TABLET ORAL 3 TIMES DAILY
Status: DISCONTINUED | OUTPATIENT
Start: 2020-03-20 | End: 2020-03-21 | Stop reason: HOSPADM

## 2020-03-20 RX ORDER — POTASSIUM CHLORIDE 20 MEQ/1
40 TABLET, EXTENDED RELEASE ORAL 2 TIMES DAILY WITH MEALS
Status: DISCONTINUED | OUTPATIENT
Start: 2020-03-20 | End: 2020-03-21 | Stop reason: HOSPADM

## 2020-03-20 RX ORDER — BACLOFEN 10 MG/1
10 TABLET ORAL 3 TIMES DAILY
Status: DISCONTINUED | OUTPATIENT
Start: 2020-03-20 | End: 2020-03-20

## 2020-03-20 RX ORDER — DEXAMETHASONE 4 MG/1
4 TABLET ORAL 4 TIMES DAILY
Status: DISCONTINUED | OUTPATIENT
Start: 2020-03-20 | End: 2020-03-21 | Stop reason: HOSPADM

## 2020-03-20 RX ORDER — GABAPENTIN 300 MG/1
300 CAPSULE ORAL 3 TIMES DAILY
Status: DISCONTINUED | OUTPATIENT
Start: 2020-03-20 | End: 2020-03-21 | Stop reason: HOSPADM

## 2020-03-20 RX ORDER — PANTOPRAZOLE SODIUM 40 MG/1
40 TABLET, DELAYED RELEASE ORAL
Status: DISCONTINUED | OUTPATIENT
Start: 2020-03-21 | End: 2020-03-21 | Stop reason: HOSPADM

## 2020-03-20 RX ADMIN — DEXAMETHASONE 4 MG: 4 TABLET ORAL at 17:15

## 2020-03-20 RX ADMIN — ACETAMINOPHEN 650 MG: 325 TABLET ORAL at 14:41

## 2020-03-20 RX ADMIN — POTASSIUM CHLORIDE 40 MEQ: 1500 TABLET, EXTENDED RELEASE ORAL at 17:14

## 2020-03-20 RX ADMIN — DEXAMETHASONE 4 MG: 4 TABLET ORAL at 20:49

## 2020-03-20 RX ADMIN — SODIUM CHLORIDE, PRESERVATIVE FREE 10 ML: 5 INJECTION INTRAVENOUS at 20:52

## 2020-03-20 RX ADMIN — DEXAMETHASONE SODIUM PHOSPHATE 4 MG: 4 INJECTION, SOLUTION INTRAMUSCULAR; INTRAVENOUS at 03:37

## 2020-03-20 RX ADMIN — DOXYCYCLINE HYCLATE 100 MG: 100 TABLET, COATED ORAL at 08:08

## 2020-03-20 RX ADMIN — SODIUM CHLORIDE, PRESERVATIVE FREE 10 ML: 5 INJECTION INTRAVENOUS at 03:37

## 2020-03-20 RX ADMIN — GABAPENTIN 300 MG: 300 CAPSULE ORAL at 20:49

## 2020-03-20 RX ADMIN — BACLOFEN 5 MG: 10 TABLET ORAL at 14:42

## 2020-03-20 RX ADMIN — LEVETIRACETAM 750 MG: 500 TABLET ORAL at 20:48

## 2020-03-20 RX ADMIN — LEVETIRACETAM 750 MG: 100 INJECTION, SOLUTION INTRAVENOUS at 13:33

## 2020-03-20 RX ADMIN — GABAPENTIN 300 MG: 300 CAPSULE ORAL at 14:41

## 2020-03-20 RX ADMIN — LEVETIRACETAM 750 MG: 100 INJECTION, SOLUTION INTRAVENOUS at 03:36

## 2020-03-20 RX ADMIN — DOXYCYCLINE HYCLATE 100 MG: 100 TABLET, COATED ORAL at 20:49

## 2020-03-20 RX ADMIN — DEXAMETHASONE SODIUM PHOSPHATE 4 MG: 4 INJECTION, SOLUTION INTRAMUSCULAR; INTRAVENOUS at 10:54

## 2020-03-20 RX ADMIN — BACLOFEN 5 MG: 10 TABLET ORAL at 20:49

## 2020-03-20 RX ADMIN — SODIUM CHLORIDE, PRESERVATIVE FREE 10 ML: 5 INJECTION INTRAVENOUS at 08:09

## 2020-03-20 RX ADMIN — PANTOPRAZOLE SODIUM 40 MG: 40 INJECTION, POWDER, FOR SOLUTION INTRAVENOUS at 08:08

## 2020-03-20 ASSESSMENT — PAIN SCALES - GENERAL
PAINLEVEL_OUTOF10: 0
PAINLEVEL_OUTOF10: 3
PAINLEVEL_OUTOF10: 0

## 2020-03-20 NOTE — PROGRESS NOTES
Palliative Care RN note: Dr. Alaina Nicolas spoke with me this am and asked that I follow up with consult he called to HOD yesterday. I spoke with Nahomy Titus at [facility] and they will reach out to his POA to schedule.

## 2020-03-20 NOTE — PROGRESS NOTES
Patient is to go home on hospice around three pm tomorrow. Pain is being managed with prn tylenol. Frequent rounding to ensure patient safety and comfort, call light within reach, plan for discharge tomorrow.

## 2020-03-20 NOTE — CONSULTS
[address] [place] [address] Consult Note    Date: 3/20/2020  Name: Licha Ferguson  MRN: [id number]  YOB: 1950   Patient's PCP: Suzanne Aburto MD   Oncology: Dr Charmian Osler  Referring Physician: Dr Berlin Santiago care admission date: 3/18/2020 and 3/2 to 3/9/20 and 1/27 to 1/31/20     Informant: Chart reviewed, discussed with Dr Rogelio Kingsley. Concha Myers, Palliative Care RN, PASQUALE Beckham RN, and I met with the patient at the bedside. The patient has some confusion. I did talk with significant other, Latanya Diaz, by phone. CC: cancer    Qagan Tayagungin: This is a 68 y.o. 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 The patient has had previous chemotherapy and immunotherapy. The patient was on anticoagulation (Warfarin or Apixiban?) and the INR was 7.23. The patient received correction, and the bleeding has slowed. The hemoglobin has dropped but has beed ~ stable in the 9's. Nuclear medicine bleeding scan did not identify active bleeding site. The supposition is that this was diverticular bleeding aggravated by coagulopathy, and there are no plans for endoscopy. The patient has been in declining health with increasing care needs and debility. He has had some confusion and is slow to respond. He denies any pain, and Tracie confirms. His appetite is fair, and weight is several pounds down from January 2020. The patient's goal is to be at home. Oncology has recommended hospice. Latanya Diaz reports supportive family and friends, and they have some equipment present in the home. Latanya Diaz is coming to the hospital to meet with the hospice nurse liaison. Hospice philosophy was discussed regarding care and comfort at the end of life.  Questions were answered, and emotional support was provided. They are aware that Hospice does not provide for the patients 24 hour care giving needs. The patient is DNR-arrest status but agreeable to comfort care.      Past Medical History:   Diagnosis Date    Arthritis     \"Neck\"    Cancer ([address]     \"found I have rare form of bladder cancer and seeing Dr Kimi Rogers for chemo    Diverticulitis Dx In 2000    \"One Episode\"    History of urostomy     Hypertension     Nocturia     Shortness of breath on exertion     Sleep apnea     No CPAP \"I Use A Mouth Piece\"    Teeth missing     Upper And Lower    Urinary frequency     Wears glasses     [place] teeth extracted     4 Lee[place] Teeth Extracted In Past       Past Surgical History:   Procedure Laterality Date    BRAIN SURGERY      gamma knife surgery left parietal    COLONOSCOPY  Last Done In 2017    Polyps Removed In Past    CYSTOSCOPY  05/15/2018    Done At Dr. Mala Webb Right Early 4183'P    ENDOSCOPY, COLON, DIAGNOSTIC  2000    EYE SURGERY Bilateral 1983    \"Radial Kerotomy\"    OTHER SURGICAL HISTORY  05/21/2018    turbt    TUNNELED VENOUS PORT PLACEMENT Right 06/26/2018    DR Aspen Danielson, TVB#[id number]    VASECTOMY  1982    WISDOM TOOTH EXTRACTION      4 Lee[place] Teeth Extracted In Past       Social History     Socioeconomic History    Marital status: Life Partner     Spouse name: Not on file    Number of children: 3    Years of education: Not on file    Highest education level: Not on file   Occupational History    Occupation: retired     Comment: School superintendant   Social Needs    Financial resource strain: Not on file    Food insecurity     Worry: Not on file     Inability: Not on file   [employer] needs     Medical: Not on file     Non-medical: Not on file   Tobacco Use    Smoking status: Former Smoker     Packs/day: 2.00     Years: 11.00     Pack years: 22.00     Types: Cigarettes     Start date: 1964     Last attempt to quit: 1975     Years since quittin.2    Smokeless tobacco: Never Used   Substance and Sexual Activity    Alcohol use: Yes     Comment: \"A Couple Times A Year\"    Drug use: No    Sexual activity: Not Currently   Lifestyle    Physical activity     Days per week: Not on file     Minutes per session: Not on file    Stress: Not on file   Relationships    Social connections     Talks on phone: Not on file     Gets together: Not on file     Attends Mosque service: Not on file     Active member of club or organization: Not on file     Attends meetings of clubs or organizations: Not on file     Relationship status: Not on file    Intimate partner violence     Fear of current or ex partner: Not on file     Emotionally abused: Not on file     Physically abused: Not on file     Forced sexual activity: Not on file   Other Topics Concern    Not on file   Social History Narrative    Not on file       Family History   Problem Relation Age of Onset    Alzheimer's Disease Mother     Other Brother         Acid Reflux       Allergies   Allergen Reactions    Niacin And Related Hives    Furosemide        Medication list reviewed  Prior to Admission medications    Medication Sig Start Date End Date Taking?  Authorizing Provider   apixaban (ELIQUIS) 2.5 MG TABS tablet Take 2.5 mg by mouth 2 times daily   Yes Historical Provider, MD   Multiple Vitamins-Minerals (MULTIVITAMIN ADULT PO) Take 1 tablet by mouth daily   Yes Historical Provider, MD   amLODIPine (NORVASC) 10 MG tablet Take 1 tablet by mouth daily 3/10/20  Yes Ashley Benites MD   hydroCHLOROthiazide (HYDRODIURIL) 50 MG tablet Take 1 tablet by mouth daily 3/10/20  Yes Ashley Benites MD   doxycycline hyclate (VIBRAMYCIN) 100 MG capsule Take 100 mg by mouth 2 times daily   Yes Historical Provider, MD   polyethylene glycol (GLYCOLAX) powder Take 17 g by mouth daily as needed   Yes Historical Provider, MD   dexamethasone (DECADRON) 4 MG tablet Take 4 mg by mouth

## 2020-03-20 NOTE — PROGRESS NOTES
needs, home safety    Safety: Left in chair with all needs in reach. Gait belt used for transfer and mobility. Time in:  0935  Time out:  1000  Timed treatment minutes:  10  Total treatment time:  25    Electronically signed by:    [id number] David Bustillos, OTR/L, [facility]   [id number]   12:00 PM, 3/20/2020

## 2020-03-20 NOTE — PROGRESS NOTES
Physical Therapy  [facility] ACUTE CARE PHYSICAL THERAPY EVALUATION  Arline Gaucher, 1950, 1220/9204-N, 3/20/2020    History  [place]:  The primary encounter diagnosis was Rectal bleeding. Diagnoses of Leukocytosis, unspecified type and Lower abdominal pain were also pertinent to this visit. Patient  has a past medical history of Arthritis, Cancer ([address] Diverticulitis, History of urostomy, Hypertension, Nocturia, Shortness of breath on exertion, Sleep apnea, Teeth missing, Urinary frequency, Wears glasses, and [place] teeth extracted. Patient  has a past surgical history that includes Elbow surgery (Right, Early 1980's); eye surgery (Bilateral, 1983); [place] tooth extraction; Cystoscopy (05/15/2018); Colonoscopy (Last Done In 2017); Endoscopy, colon, diagnostic (2000); Vasectomy (1982); other surgical history (05/21/2018); Tunneled venous port placement (Right, 06/26/2018); and brain surgery. Subjective:  Patient states:  \"I need to lie down\"   Pain:  Denies   Communication with other providers:  Co-eval with Alysha GARCIA   Restrictions: contact precautions, fall risk, urostomy     Home Setup/Prior level of function   Pt has been at [facility]. Pt very lethargic and slow to respond. He did report he was using the walker a little and required only single person assist for transfers. From prior charting, he lives with his significant other and has a WC, RW, 4WW, and hospital bed. Examination of body systems (includes body structures/functions, activity/participation limitations):  · Observation:  Supine in bed upon arrival. Lethargic with inc time to process. Single step commands following/comprehending most of the time needing inc VC/TCs and demonstrations for sequencing some of the mobility tasks. · Vision:  WFL, glasses  · Hearing:  [employer]   · Cardiopulmonary:  Stable vitals throughout session. · Orientation: oriented to person, disoriented to time and place.      Musculoskeletal  · ROM R/L:

## 2020-03-20 NOTE — PROGRESS NOTES
Hospitalist Progress Note      Name:  Jessica Molina /Age/Sex: 1950  (68 y.o. male)   MRN & CSN:  [id number] & [id number] Admission Date/Time: 3/18/2020  8:16 AM   Location:  [address] [place] [address] PCP: Terry Mccord MD         Hospital Day: 3    Assessment and Plan:   Jessica Setting is a 68 y.o.  male  who presents with Rectal bleeding    > Rectal bleed  - CT abd with diverticulosis, resolved diverticulitis,   -  Held eliquis, H&H, IV PPI, bleeding scan neg, consult GI  - most likely diverticular bleed complicated with hypercoagulable state, bleeding stopped  - started on liquid diet. Advanced. - hospice requested and was consutled     > Right LE DVT with 2019 s/p IVC filter - on eliquis, Held, given Vit K and Kcentra in ED due to GI bleed  > H/o MRSA, UTI - c/w oral Vibramycin, contact isolation. > Seizures - c/w Keppra, seizure precautions.     > Failure to Thrive  -Generalized weakness/deconditioning with metastatic cancer  - pt is DNR CCA.      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 - possible new liver mets on CT  - oncology consulted, consider palliative care consult   - hospice requested and was consutled    Neuropathy    Diet DIET GENERAL;   DVT Prophylaxis ? SCD. Eliquis held due to GI bleed   Code Status DNR-CCA   Disposition  Home with hospice anticipate tomorrow     History of Present Illness:     Pt S&E. No chest pain, no dyspnea, no abd pain, no more bleed, D/W hospice team , plan for home with hospice. 10-14 point ROS reviewed negative, unless as noted above    Objective:        Intake/Output Summary (Last 24 hours) at 3/20/2020 0955  Last data filed at 3/20/2020 0618  Gross per 24 hour   Intake 480 ml   Output 1000 ml   Net -520 ml      Vitals:   Vitals: 03/20/20 0940   BP:    Pulse:    Resp:    Temp:    SpO2: 99%     Physical Exam:      GEN    Awake male, sitting upright in bed in no apparent distress. Appears given age. RESP  Clear to auscultation, no wheezes, rales or rhonchi. Symmetric chest movement . CARDIO/VASC           S1/S2 auscultated. Regular rate . No peripheral edema. GI        Abdomen is soft without significant tenderness, or guarding. Bowel sounds are normoactive. Rectal exam deferred. Urostomy in place  MSK    No gross joint deformities. Generalized weakness, muscle atrophy  SKIN    Normal coloration, warm, dry. NEURO           impaired speech, no lateralizing weakness. PSYCH            Awake, alert, oriented x 4. Affect appropriate.       Medications:   Medications:    sodium chloride flush  10 mL Intravenous 2 times per day    doxycycline hyclate  100 mg Oral BID    pantoprazole  40 mg Intravenous BID    levetiracetam  750 mg Intravenous Q12H    dexamethasone  4 mg Intravenous Q6H      Infusions:   PRN Meds: sodium chloride flush, 10 mL, PRN  acetaminophen, 650 mg, Q6H PRN    Or  acetaminophen, 650 mg, Q6H PRN  promethazine, 12.5 mg, Q6H PRN    Or  ondansetron, 4 mg, Q6H PRN          Electronically signed by Jamel العلي MD on 3/20/2020 at 9:55 AM

## 2020-03-21 VITALS
WEIGHT: 174.6 LBS | DIASTOLIC BLOOD PRESSURE: 60 MMHG | OXYGEN SATURATION: 99 % | TEMPERATURE: 98.6 F | BODY MASS INDEX: 24.44 KG/M2 | RESPIRATION RATE: 15 BRPM | HEART RATE: 68 BPM | HEIGHT: 71 IN | SYSTOLIC BLOOD PRESSURE: 126 MMHG

## 2020-03-21 LAB
ANION GAP SERPL CALCULATED.3IONS-SCNC: 10 MMOL/L (ref 4–16)
BUN BLDV-MCNC: 37 MG/DL (ref 6–23)
CALCIUM SERPL-MCNC: 8.4 MG/DL (ref 8.3–10.6)
CHLORIDE BLD-SCNC: 100 MMOL/L (ref 99–110)
CO2: 27 MMOL/L (ref 21–32)
CREAT SERPL-MCNC: 0.7 MG/DL (ref 0.9–1.3)
GFR AFRICAN AMERICAN: >60 ML/MIN/1.73M2
GFR NON-AFRICAN AMERICAN: >60 ML/MIN/1.73M2
GLUCOSE BLD-MCNC: 146 MG/DL (ref 70–99)
HCT VFR BLD CALC: 27.5 % (ref 42–52)
HEMOGLOBIN: 9 GM/DL (ref 13.5–18)
MCH RBC QN AUTO: 31.4 PG (ref 27–31)
MCHC RBC AUTO-ENTMCNC: 32.7 % (ref 32–36)
MCV RBC AUTO: 95.8 FL (ref 78–100)
PDW BLD-RTO: 13.8 % (ref 11.7–14.9)
PLATELET # BLD: 290 K/CU MM (ref 140–440)
PMV BLD AUTO: 9.9 FL (ref 7.5–11.1)
POTASSIUM SERPL-SCNC: 3.6 MMOL/L (ref 3.5–5.1)
RBC # BLD: 2.87 M/CU MM (ref 4.6–6.2)
SODIUM BLD-SCNC: 137 MMOL/L (ref 135–145)
WBC # BLD: 12.9 K/CU MM (ref 4–10.5)

## 2020-03-21 PROCEDURE — 6370000000 HC RX 637 (ALT 250 FOR IP): Performed by: HOSPITALIST

## 2020-03-21 PROCEDURE — 80048 BASIC METABOLIC PNL TOTAL CA: CPT

## 2020-03-21 PROCEDURE — 6360000002 HC RX W HCPCS: Performed by: HOSPITALIST

## 2020-03-21 PROCEDURE — 85027 COMPLETE CBC AUTOMATED: CPT

## 2020-03-21 PROCEDURE — G0378 HOSPITAL OBSERVATION PER HR: HCPCS

## 2020-03-21 PROCEDURE — 2580000003 HC RX 258: Performed by: HOSPITALIST

## 2020-03-21 RX ORDER — PANTOPRAZOLE SODIUM 40 MG/1
40 TABLET, DELAYED RELEASE ORAL
Qty: 30 TABLET | Refills: 1 | Status: SHIPPED | OUTPATIENT
Start: 2020-03-22

## 2020-03-21 RX ORDER — BACLOFEN 5 MG/1
5 TABLET ORAL 3 TIMES DAILY
Qty: 90 TABLET | Refills: 1 | Status: SHIPPED | OUTPATIENT
Start: 2020-03-21

## 2020-03-21 RX ORDER — HEPARIN SODIUM (PORCINE) LOCK FLUSH IV SOLN 100 UNIT/ML 100 UNIT/ML
500 SOLUTION INTRAVENOUS ONCE
Status: COMPLETED | OUTPATIENT
Start: 2020-03-21 | End: 2020-03-21

## 2020-03-21 RX ADMIN — POTASSIUM CHLORIDE 40 MEQ: 1500 TABLET, EXTENDED RELEASE ORAL at 09:57

## 2020-03-21 RX ADMIN — GABAPENTIN 300 MG: 300 CAPSULE ORAL at 09:57

## 2020-03-21 RX ADMIN — PANTOPRAZOLE SODIUM 40 MG: 40 TABLET, DELAYED RELEASE ORAL at 05:10

## 2020-03-21 RX ADMIN — BACLOFEN 5 MG: 10 TABLET ORAL at 09:56

## 2020-03-21 RX ADMIN — Medication 500 UNITS: at 13:49

## 2020-03-21 RX ADMIN — SODIUM CHLORIDE, PRESERVATIVE FREE 10 ML: 5 INJECTION INTRAVENOUS at 09:01

## 2020-03-21 RX ADMIN — GABAPENTIN 300 MG: 300 CAPSULE ORAL at 14:00

## 2020-03-21 RX ADMIN — DEXAMETHASONE 4 MG: 4 TABLET ORAL at 14:00

## 2020-03-21 RX ADMIN — LEVETIRACETAM 750 MG: 500 TABLET ORAL at 09:56

## 2020-03-21 RX ADMIN — DEXAMETHASONE 4 MG: 4 TABLET ORAL at 09:57

## 2020-03-21 RX ADMIN — BACLOFEN 5 MG: 10 TABLET ORAL at 14:00

## 2020-03-21 RX ADMIN — DOXYCYCLINE HYCLATE 100 MG: 100 TABLET, COATED ORAL at 09:57

## 2020-03-21 ASSESSMENT — PAIN SCALES - GENERAL: PAINLEVEL_OUTOF10: 0

## 2020-03-21 NOTE — DISCHARGE SUMMARY
Consults this admission:  IP CONSULT TO GI  IP CONSULT TO HOSPITALIST  IP CONSULT TO ONCOLOGY  IP CONSULT TO HOSPICE    Discharge Instruction:   Follow up appointments:            Follow up with Jerry Cadena MD   Specialty: Family Medicine  [address]   [address] [facility]   [phone]    Apr2 Chemo 1 hr   Thursday Apr 2, 2020 1:45 PM  Linda Nelsy Wilcox  [facility] [address]   [phone]        Disposition: Discharged to:   ?Home, ? [place] [address]  Condition on discharge: Stable    Discharge Medications:      [place]   Home Medication Instructions [facility]:[id number]    Printed on:03/21/20 0908   Medication Information                      acetaminophen (TYLENOL) 500 MG tablet  Take 500 mg by mouth every 4 hours as needed for Pain             b complex vitamins capsule  Take 1 capsule by mouth daily             baclofen 5 MG TABS  Take 5 mg by mouth 3 times daily             dexamethasone (DECADRON) 4 MG tablet  Take 4 mg by mouth 4 times daily              doxycycline hyclate (VIBRAMYCIN) 100 MG capsule  Take 100 mg by mouth 2 times daily             dronabinol (MARINOL) 5 MG capsule  Take 5 mg by mouth 2 times daily (before meals). gabapentin (NEURONTIN) 300 MG capsule  Take 300 mg by mouth 3 times daily.              Lactobacillus (PROBIOTIC ACIDOPHILUS PO)  Take 1 capsule by mouth every morning Over The Counter              levETIRAcetam (KEPPRA) 750 MG tablet  Take 1 tablet by mouth 2 times daily             menthol-zinc oxide (CALMOSEPTINE) 0.44-20.625 % OINT ointment  Apply topically daily 1/27/2020 Apply to right heel daily             mirtazapine (REMERON) 45 MG tablet  Take 45 mg by mouth nightly              pantoprazole (PROTONIX) 40 MG tablet  Take 1 tablet by mouth every morning (before breakfast)             polyethylene glycol (GLYCOLAX) powder  Take 17 g by mouth daily as needed             senna-docusate (PERICOLACE) 8.6-50 MG per tablet  Take 1 tablet by mouth as needed              Vitamins A & D (VITAMIN A & D) ointment  Apply topically 2 times daily 10/03/19 Apply topically to gluteal area every shift                 Objective Findings at Discharge:   BP (!) 147/68   Pulse 55   Temp 97.9 °F (36.6 °C) (Oral)   Resp 14   Ht 5' 11\" (1.803 m)   Wt 174 lb 9.6 oz (79.2 kg)   SpO2 99%   BMI 24.35 kg/m²            PHYSICAL EXAM   GEN    Awake male, sitting upright in bed in no apparent distress. Appears given age. RESP  Clear to auscultation, no wheezes, rales or rhonchi.  Symmetric chest movement . CARDIO/VASC           S1/S2 auscultated. Regular rate .    GI        Abdomen is soft without significant tenderness, or guarding. Bowel sounds are normoactive. Rectal exam deferred. Urostomy in place  MSK    No gross joint deformities. Generalized weakness, muscle atrophy  SKIN    Normal coloration, warm, dry. NEURO           impaired speech,   PSYCH            Awake, alert, oriented .  Affect appropriate.     BMP/CBC  Recent Labs     03/19/20  0130  03/19/20  2230 03/20/20  0615 03/20/20  0743 03/21/20  0510     --   --  135  --  137   K 3.8  --   --  3.4*  --  3.6   CL 98*  --   --  98*  --  100   CO2 27  --   --  28  --  27   BUN 48*  --   --  40*  --  37*   CREATININE 0.9  --   --  0.7*  --  0.7*   WBC  --   --   --   --   --  12.9*   HCT 34.9*   < > 29.3*  --  28.4* 27.5*   PLT  --   --   --   --   --  290    < > = values in this interval not displayed.        IMAGING:  Nm Gi Blood Loss    Result Date: 3/18/2020  EXAMINATION: NUCLEAR MEDICINE GASTRIC BLEEDING STUDY 3/18/2020 TECHNIQUE: Following the intravenous injection of 26.8 mCi of 99 mTc-labeled RBC's, a flow study and standard images of the abdomen was obtained over a total period of 60 minutes COMPARISON: CT dated 03/18/2020 HISTORY: ORDERING SYSTEM PROVIDED HISTORY: active GI bleed TECHNOLOGIST PROVIDED HISTORY: Reason for exam:->active GI bleed Reason for Exam: gi bleeding Acuity: Acute Type of Exam: Unknown FINDINGS: Activity is seen within the blood pool, including the great vessels, heart, liver, and spleen. Activity is seen within the kidneys and focus of activity is demonstrated within bowel loop at the level of the iliac bifurcation by approximately 10 minutes, progressively filling on later images with subsequent activity appearing within urostomy bag overlying the lower abdomen. No additional foci of peristalsis demonstrated. Mild activity within bowel in the left hemiabdomen remaining relatively static throughout the examination, which can reflect hyperemia. Limited exam due to physiologic urinary activity within bowel given patient history of urinary diversion. Within these limits, no definite findings of active GI bleed. Relatively static activity within bowel in the left hemiabdomen, suggestive of hyperemia. RECOMMENDATIONS: If the patient shows hemodynamic signs of an active bleed in the next 20 hours, additional images can be acquired. Ct Abdomen Pelvis W Iv Contrast Additional Contrast? None    Result Date: 3/18/2020  EXAMINATION: CT OF THE ABDOMEN AND PELVIS WITH CONTRAST 3/18/2020 9:30 am TECHNIQUE: CT of the abdomen and pelvis was performed with the administration of 75 mL Isovue 370 intravenous contrast. Multiplanar reformatted images are provided for review. Dose modulation, iterative reconstruction, and/or weight based adjustment of the mA/kV was utilized to reduce the radiation dose to as low as reasonably achievable. COMPARISON: 03/03/2020 HISTORY: Acute abdominal pain with leukocytosis. Patient has bladder cancer. FINDINGS: Lower Chest: Mild bibasilar atelectasis/scarring. Organs: Within the liver, there are multiple hypovascular lesions measuring up to 3.4 cm which were not well-visualized on previous noncontrast CTs. Spleen, pancreas, adrenals, and gallbladder are unremarkable. Symmetric enhancement of the kidneys without hydronephrosis.

## 2021-02-16 NOTE — DISCHARGE INSTR - COC
Patient: Remberto Brantley 89 year old male     Surgeon(s): Libra Estrada MD  Phone Number: [phone]                       Surgeon(s) and Role:     * Libra Estrada MD - Primary     Procedure: Procedure:    LENS EXTRACTION WITH INTRAOCULAR LENS IMPLANT OF THE LEFT EYE lens divided in half with miloop  CPT(R) Code:  94063 - EXTRACAPSULAR CATARACT REMOVAL W INSERT IO LENS PROSTH WO ECP      Anesthesia Type: Not documented                                 Monitor Anesthesia Care     Specimens Removed: No specimens collected during this procedure.     Estimated Blood Loss: Minimal     Complications: None; patient tolerated the procedure well.   Implants     Lens    Iol Tecnis Clr [id number] 23.5 - [id number] - Implanted   (Left) Eye    Inventory item: IOL TECNIS CLR [id number] 23.5 Model/Cat number: [id number]    Serial number: [id number] : ADVANCED STERILIZATION    Lot number: NA Device identifier: [id number]    Device identifier type: GSCurrencyFairDID Information     Request status Successful      Brand name: TECNIS IOL Version/Model: ZCB00    Company name: [employer] MRI safety info as of 2/16/21: Labeling does not contain MRI Safety Information    Contains dry or latex rubber: No      GMDN P.T. name: Posterior-chamber intraocular lens, pseudophakic            As of 2/16/2021     Status: Implanted                         Pre-Op Diagnosis: AGE RELATED NUCLEAR CATARACT OF THE LEFT EYE     Post-Op Diagnosis: * No Diagnosis Codes entered *     Implants:    Implants     Lens    Iol Tecnis Clr [id number] 23.5 - [id number] - Implanted   (Left) Eye    Inventory item: IOL TECNIS CLR [id number] 23.5 Model/Cat number: [id number]    Serial number: [id number] : ADVANCED STERILIZATION    Lot number: NA Device identifier: [id number]    Device identifier type: GS1      CompareNetworksDID Information     Request status Successful      Brand name: TECNIS IOL Version/Model: ZCB00    Company  [employer] I95.9    Severe malnutrition (HCC) E43    Bladder cancer (HCC) C67.9    Abdominal pain R10.9    Debility R53.81       Isolation/Infection:   Isolation          Contact        Patient Infection Status     Infection Onset Added Last Indicated Last Indicated By Review Planned Expiration Resolved Resolved By    MDRO (multi-drug resistant organism)  08/26/19 08/26/19 Luis Angel Gardner RN        8/20/19 Enterobacter cloacae urine          Nurse Assessment:  Last Vital Signs: BP (!) 150/76   Pulse 78   Temp 97.8 °F (36.6 °C) (Oral)   Resp 16   Ht 5' 11\" (1.803 m)   Wt 193 lb 14.4 oz (88 kg)   SpO2 96%   BMI 27.04 kg/m²     Last documented pain score (0-10 scale): Pain Level: 0  Last Weight:   Wt Readings from Last 1 Encounters:   01/31/20 193 lb 14.4 oz (88 kg)     Mental Status:  oriented, alert, coherent, logical and thought processes intact    IV Access:  - None    Nursing Mobility/ADLs:  Walking   Assisted  Transfer  Assisted  Bathing  Assisted  Dressing  Assisted  Toileting  Assisted  Feeding  Independent  Med Admin  Assisted  Med Delivery   whole    Wound Care Documentation and Therapy:        Elimination:  Continence:   · Bowel: No  · Bladder: No  Urinary Catheter: None   Colostomy/Ileostomy/Ileal Conduit: Yes       Date of Last BM: 1/31/2020    Intake/Output Summary (Last 24 hours) at 1/31/2020 1648  Last data filed at 1/31/2020 1211  Gross per 24 hour   Intake 240 ml   Output 1025 ml   Net -785 ml     I/O last 3 completed shifts:   In: 240 [P.O.:240]  Out: 2725 [Urine:2725]    Safety Concerns:     None and At Risk for Falls    Impairments/Disabilities:      None      Patient's personal belongings (please select all that are sent with patient):  Glasses    RN SIGNATURE:  Electronically signed by Fransisca Canas RN on 1/31/20 at 6:09 PM    CASE MANAGEMENT/SOCIAL WORK SECTION    Inpatient Status Date: 1/27/2020    Readmission Risk Assessment Score:  Readmission Risk              Risk of name: [employer]. MRI safety info as of 2/16/21: Labeling does not contain MRI Safety Information    Contains dry or latex rubber: No      GMDN P.T. name: Posterior-chamber intraocular lens, pseudophakic            As of 2/16/2021     Status: Implanted                            Clinical Indications:      The patient was seen and evaluated in the office and found to have a clinically significant cataract in their operative eye. Vision could not be improved by glasses, and the blurry vision was interfering with the ability to complete tasks of daily living. Informed consent was obtained, including a detailed discussion of the possible risks of the surgery, including but not limited to infection, endophthalmitis, loss of the eye, retinal detachment, and others.    Procedure:    Prior to cataract surgery the patient completed 2 days of preoperative prophylactic antibiotic drops Imprimis or ofloxacin qid.  The operative eye was prepped and draped in the usual manner including painting the skin with 10% betadine and dropping 5% betadine into the conjunctival fornix, a preop drop of moxifloxacin, and draping the lashes out of the field with sterile tegaderms. The globe was stabilized with the 0.12 forceps and a paracentesis was made with the supersharp blade at 10:00. The anterior chamber was filled first with MPF free lidocaine followed by dispersive viscoelastic. The globe was again stabilized with the 0.12 forceps and a clear corneal wound was placed at 2:00 with the keratome blade. The cystotome followed by utratta forceps were used to make a continuous curvilinear capsulorhexis and then the nucleus was hydrodissected, hydrolineated and rotated. The nucleus divided in half with MiLoop then removed using a stop and chop phacoemulsification technique, then the epinucleus was removed. Finally, irrigation/ aspiration was used to remove the cortical material. The capsular bag was reinflated with  cohesive viscoelastic and the lens implant was inserted successfully into the capsular bag and found to be well-centered. The viscoelastic was removed from the anterior chamber with irrigation/ aspiration and then both wounds were hydrated and found to be water tight. An air bubble was placed in the anterior chamber and again pressure was placed with the globe and both wounds were found to be both air and water tight.    Intracameral moxifloxacin was injected using an [place].    At the end of the case, the patient was given several drops of tetracaine for comfort, followed by several drops of diluted betadine for antimicrobial prophylaxis, and then antibiotic/ steroid ointment and a patch and shield.    The patient tolerated the procedure well and proceeded in stable condition to recovery, with instructions to return to the office tomorrow for the post-operative exam.    Libra Estrada MD